# Patient Record
Sex: FEMALE | Race: WHITE | Employment: OTHER | URBAN - METROPOLITAN AREA
[De-identification: names, ages, dates, MRNs, and addresses within clinical notes are randomized per-mention and may not be internally consistent; named-entity substitution may affect disease eponyms.]

---

## 2016-03-17 LAB
LEFT VENTRICULAR EJECTION FRACTION MODE: NORMAL
LV EF: 75 %

## 2017-02-14 PROBLEM — R07.9 CHEST PAIN: Status: ACTIVE | Noted: 2017-02-14

## 2017-02-14 PROBLEM — I34.0 MITRAL VALVE INSUFFICIENCY: Status: ACTIVE | Noted: 2017-02-14

## 2018-04-03 ENCOUNTER — HOSPITAL ENCOUNTER (INPATIENT)
Age: 78
LOS: 2 days | Discharge: HOME | DRG: 391 | End: 2018-04-06
Attending: EMERGENCY MEDICINE | Admitting: INTERNAL MEDICINE
Payer: MEDICARE

## 2018-04-03 ENCOUNTER — APPOINTMENT (OUTPATIENT)
Dept: CT IMAGING | Age: 78
DRG: 391 | End: 2018-04-03
Payer: MEDICARE

## 2018-04-03 DIAGNOSIS — R19.7 DIARRHEA, UNSPECIFIED TYPE: ICD-10-CM

## 2018-04-03 DIAGNOSIS — E86.0 DEHYDRATION: Primary | ICD-10-CM

## 2018-04-03 DIAGNOSIS — K52.9 COLITIS: ICD-10-CM

## 2018-04-03 LAB
ALBUMIN SERPL-MCNC: 3.5 G/DL (ref 3.5–5.2)
ALP BLD-CCNC: 74 U/L (ref 35–104)
ALT SERPL-CCNC: <5 U/L (ref 0–32)
ANION GAP SERPL CALCULATED.3IONS-SCNC: 16 MMOL/L (ref 7–16)
AST SERPL-CCNC: 16 U/L (ref 0–31)
BASOPHILS ABSOLUTE: 0.05 E9/L (ref 0–0.2)
BASOPHILS RELATIVE PERCENT: 0.6 % (ref 0–2)
BILIRUB SERPL-MCNC: 0.3 MG/DL (ref 0–1.2)
BUN BLDV-MCNC: 36 MG/DL (ref 8–23)
CALCIUM SERPL-MCNC: 9.4 MG/DL (ref 8.6–10.2)
CHLORIDE BLD-SCNC: 87 MMOL/L (ref 98–107)
CO2: 26 MMOL/L (ref 22–29)
CREAT SERPL-MCNC: 5.9 MG/DL (ref 0.5–1)
EKG ATRIAL RATE: 76 BPM
EKG P AXIS: 68 DEGREES
EKG P-R INTERVAL: 164 MS
EKG Q-T INTERVAL: 396 MS
EKG QRS DURATION: 80 MS
EKG QTC CALCULATION (BAZETT): 445 MS
EKG R AXIS: 57 DEGREES
EKG T AXIS: 78 DEGREES
EKG VENTRICULAR RATE: 76 BPM
EOSINOPHILS ABSOLUTE: 0.38 E9/L (ref 0.05–0.5)
EOSINOPHILS RELATIVE PERCENT: 4.5 % (ref 0–6)
GFR AFRICAN AMERICAN: 6
GFR AFRICAN AMERICAN: 8
GFR NON-AFRICAN AMERICAN: 5 ML/MIN/1.73
GFR NON-AFRICAN AMERICAN: 7 ML/MIN/1.73
GLUCOSE BLD-MCNC: 90 MG/DL (ref 74–109)
GLUCOSE BLD-MCNC: 98 MG/DL (ref 74–109)
HCT VFR BLD CALC: 35.8 % (ref 34–48)
HEMOGLOBIN: 11.5 G/DL (ref 11.5–15.5)
IMMATURE GRANULOCYTES #: 0.02 E9/L
IMMATURE GRANULOCYTES %: 0.2 % (ref 0–5)
LACTIC ACID: 1 MMOL/L (ref 0.5–2.2)
LIPASE: 36 U/L (ref 13–60)
LYMPHOCYTES ABSOLUTE: 2.68 E9/L (ref 1.5–4)
LYMPHOCYTES RELATIVE PERCENT: 31.6 % (ref 20–42)
MCH RBC QN AUTO: 32.1 PG (ref 26–35)
MCHC RBC AUTO-ENTMCNC: 32.1 % (ref 32–34.5)
MCV RBC AUTO: 100 FL (ref 80–99.9)
MONOCYTES ABSOLUTE: 1.08 E9/L (ref 0.1–0.95)
MONOCYTES RELATIVE PERCENT: 12.7 % (ref 2–12)
NEUTROPHILS ABSOLUTE: 4.28 E9/L (ref 1.8–7.3)
NEUTROPHILS RELATIVE PERCENT: 50.4 % (ref 43–80)
PDW BLD-RTO: 14 FL (ref 11.5–15)
PLATELET # BLD: 242 E9/L (ref 130–450)
PMV BLD AUTO: 11 FL (ref 7–12)
POC CHLORIDE: 98 MMOL/L (ref 100–108)
POC CREATININE: 7.5 MG/DL (ref 0.5–1)
POC POTASSIUM: 5.2 MMOL/L (ref 3.5–5)
POC SODIUM: 132 MMOL/L (ref 132–146)
POTASSIUM SERPL-SCNC: 5.1 MMOL/L (ref 3.5–5)
RBC # BLD: 3.58 E12/L (ref 3.5–5.5)
SODIUM BLD-SCNC: 129 MMOL/L (ref 132–146)
TOTAL PROTEIN: 6.7 G/DL (ref 6.4–8.3)
WBC # BLD: 8.5 E9/L (ref 4.5–11.5)

## 2018-04-03 PROCEDURE — 83605 ASSAY OF LACTIC ACID: CPT

## 2018-04-03 PROCEDURE — 36415 COLL VENOUS BLD VENIPUNCTURE: CPT

## 2018-04-03 PROCEDURE — 83690 ASSAY OF LIPASE: CPT

## 2018-04-03 PROCEDURE — 96375 TX/PRO/DX INJ NEW DRUG ADDON: CPT

## 2018-04-03 PROCEDURE — 99285 EMERGENCY DEPT VISIT HI MDM: CPT

## 2018-04-03 PROCEDURE — 2580000003 HC RX 258: Performed by: PHYSICIAN ASSISTANT

## 2018-04-03 PROCEDURE — 96374 THER/PROPH/DIAG INJ IV PUSH: CPT

## 2018-04-03 PROCEDURE — 2500000003 HC RX 250 WO HCPCS: Performed by: PHYSICIAN ASSISTANT

## 2018-04-03 PROCEDURE — 84295 ASSAY OF SERUM SODIUM: CPT

## 2018-04-03 PROCEDURE — 96372 THER/PROPH/DIAG INJ SC/IM: CPT

## 2018-04-03 PROCEDURE — 82565 ASSAY OF CREATININE: CPT

## 2018-04-03 PROCEDURE — 84132 ASSAY OF SERUM POTASSIUM: CPT

## 2018-04-03 PROCEDURE — 6360000002 HC RX W HCPCS: Performed by: PHYSICIAN ASSISTANT

## 2018-04-03 PROCEDURE — S0028 INJECTION, FAMOTIDINE, 20 MG: HCPCS | Performed by: PHYSICIAN ASSISTANT

## 2018-04-03 PROCEDURE — 74176 CT ABD & PELVIS W/O CONTRAST: CPT

## 2018-04-03 PROCEDURE — 82435 ASSAY OF BLOOD CHLORIDE: CPT

## 2018-04-03 PROCEDURE — 82947 ASSAY GLUCOSE BLOOD QUANT: CPT

## 2018-04-03 PROCEDURE — 80053 COMPREHEN METABOLIC PANEL: CPT

## 2018-04-03 PROCEDURE — 85025 COMPLETE CBC W/AUTO DIFF WBC: CPT

## 2018-04-03 PROCEDURE — 93005 ELECTROCARDIOGRAM TRACING: CPT | Performed by: NURSE PRACTITIONER

## 2018-04-03 RX ORDER — ONDANSETRON 2 MG/ML
4 INJECTION INTRAMUSCULAR; INTRAVENOUS ONCE
Status: COMPLETED | OUTPATIENT
Start: 2018-04-03 | End: 2018-04-03

## 2018-04-03 RX ORDER — DICYCLOMINE HYDROCHLORIDE 10 MG/ML
20 INJECTION INTRAMUSCULAR ONCE
Status: COMPLETED | OUTPATIENT
Start: 2018-04-03 | End: 2018-04-03

## 2018-04-03 RX ORDER — CIPROFLOXACIN 2 MG/ML
400 INJECTION, SOLUTION INTRAVENOUS ONCE
Status: COMPLETED | OUTPATIENT
Start: 2018-04-03 | End: 2018-04-04

## 2018-04-03 RX ORDER — SODIUM CHLORIDE 9 MG/ML
INJECTION, SOLUTION INTRAVENOUS CONTINUOUS
Status: DISCONTINUED | OUTPATIENT
Start: 2018-04-03 | End: 2018-04-04 | Stop reason: SDUPTHER

## 2018-04-03 RX ADMIN — FAMOTIDINE 20 MG: 10 INJECTION INTRAVENOUS at 19:41

## 2018-04-03 RX ADMIN — DICYCLOMINE HYDROCHLORIDE 20 MG: 20 INJECTION, SOLUTION INTRAMUSCULAR at 19:41

## 2018-04-03 RX ADMIN — ONDANSETRON 4 MG: 2 INJECTION INTRAMUSCULAR; INTRAVENOUS at 19:41

## 2018-04-03 RX ADMIN — SODIUM CHLORIDE: 9 INJECTION, SOLUTION INTRAVENOUS at 21:40

## 2018-04-04 PROBLEM — K52.9 COLITIS: Status: ACTIVE | Noted: 2018-04-04

## 2018-04-04 LAB — OSMOLALITY: 280 MOSM/KG (ref 285–310)

## 2018-04-04 PROCEDURE — G8979 MOBILITY GOAL STATUS: HCPCS | Performed by: PHYSICAL THERAPIST

## 2018-04-04 PROCEDURE — 6370000000 HC RX 637 (ALT 250 FOR IP): Performed by: INTERNAL MEDICINE

## 2018-04-04 PROCEDURE — 6360000002 HC RX W HCPCS: Performed by: INTERNAL MEDICINE

## 2018-04-04 PROCEDURE — 6360000002 HC RX W HCPCS: Performed by: PHYSICIAN ASSISTANT

## 2018-04-04 PROCEDURE — 2500000003 HC RX 250 WO HCPCS: Performed by: INTERNAL MEDICINE

## 2018-04-04 PROCEDURE — G8978 MOBILITY CURRENT STATUS: HCPCS | Performed by: PHYSICAL THERAPIST

## 2018-04-04 PROCEDURE — G8988 SELF CARE GOAL STATUS: HCPCS

## 2018-04-04 PROCEDURE — 97161 PT EVAL LOW COMPLEX 20 MIN: CPT | Performed by: PHYSICAL THERAPIST

## 2018-04-04 PROCEDURE — G8980 MOBILITY D/C STATUS: HCPCS | Performed by: PHYSICAL THERAPIST

## 2018-04-04 PROCEDURE — 2580000003 HC RX 258: Performed by: INTERNAL MEDICINE

## 2018-04-04 PROCEDURE — 83930 ASSAY OF BLOOD OSMOLALITY: CPT

## 2018-04-04 PROCEDURE — G8989 SELF CARE D/C STATUS: HCPCS

## 2018-04-04 PROCEDURE — 1200000000 HC SEMI PRIVATE

## 2018-04-04 PROCEDURE — G8987 SELF CARE CURRENT STATUS: HCPCS

## 2018-04-04 PROCEDURE — 2500000003 HC RX 250 WO HCPCS: Performed by: PHYSICIAN ASSISTANT

## 2018-04-04 PROCEDURE — 5A1D70Z PERFORMANCE OF URINARY FILTRATION, INTERMITTENT, LESS THAN 6 HOURS PER DAY: ICD-10-PCS | Performed by: INTERNAL MEDICINE

## 2018-04-04 PROCEDURE — 97165 OT EVAL LOW COMPLEX 30 MIN: CPT

## 2018-04-04 PROCEDURE — 36415 COLL VENOUS BLD VENIPUNCTURE: CPT

## 2018-04-04 PROCEDURE — 90935 HEMODIALYSIS ONE EVALUATION: CPT

## 2018-04-04 RX ORDER — SODIUM CHLORIDE 0.9 % (FLUSH) 0.9 %
10 SYRINGE (ML) INJECTION PRN
Status: DISCONTINUED | OUTPATIENT
Start: 2018-04-04 | End: 2018-04-04 | Stop reason: SDUPTHER

## 2018-04-04 RX ORDER — CIPROFLOXACIN 2 MG/ML
400 INJECTION, SOLUTION INTRAVENOUS EVERY 12 HOURS
Status: DISCONTINUED | OUTPATIENT
Start: 2018-04-04 | End: 2018-04-04

## 2018-04-04 RX ORDER — PRAVASTATIN SODIUM 20 MG
80 TABLET ORAL DAILY
Status: DISCONTINUED | OUTPATIENT
Start: 2018-04-04 | End: 2018-04-06 | Stop reason: HOSPADM

## 2018-04-04 RX ORDER — AMLODIPINE BESYLATE 2.5 MG/1
2.5 TABLET ORAL DAILY
Status: DISCONTINUED | OUTPATIENT
Start: 2018-04-05 | End: 2018-04-06 | Stop reason: HOSPADM

## 2018-04-04 RX ORDER — AMLODIPINE BESYLATE 5 MG/1
5 TABLET ORAL DAILY
Status: DISCONTINUED | OUTPATIENT
Start: 2018-04-04 | End: 2018-04-04

## 2018-04-04 RX ORDER — HEPARIN SODIUM 10000 [USP'U]/ML
5000 INJECTION, SOLUTION INTRAVENOUS; SUBCUTANEOUS EVERY 8 HOURS SCHEDULED
Status: DISCONTINUED | OUTPATIENT
Start: 2018-04-04 | End: 2018-04-06 | Stop reason: HOSPADM

## 2018-04-04 RX ORDER — ACETAMINOPHEN 325 MG/1
650 TABLET ORAL EVERY 4 HOURS PRN
Status: DISCONTINUED | OUTPATIENT
Start: 2018-04-04 | End: 2018-04-04 | Stop reason: SDUPTHER

## 2018-04-04 RX ORDER — HYDROCODONE BITARTRATE AND ACETAMINOPHEN 5; 325 MG/1; MG/1
2 TABLET ORAL EVERY 4 HOURS PRN
Status: DISCONTINUED | OUTPATIENT
Start: 2018-04-04 | End: 2018-04-06 | Stop reason: HOSPADM

## 2018-04-04 RX ORDER — ACETAMINOPHEN 325 MG/1
650 TABLET ORAL EVERY 4 HOURS PRN
Status: DISCONTINUED | OUTPATIENT
Start: 2018-04-04 | End: 2018-04-06 | Stop reason: HOSPADM

## 2018-04-04 RX ORDER — POTASSIUM CHLORIDE 20 MEQ/1
40 TABLET, EXTENDED RELEASE ORAL PRN
Status: DISCONTINUED | OUTPATIENT
Start: 2018-04-04 | End: 2018-04-04

## 2018-04-04 RX ORDER — SODIUM CHLORIDE 0.9 % (FLUSH) 0.9 %
10 SYRINGE (ML) INJECTION EVERY 12 HOURS SCHEDULED
Status: DISCONTINUED | OUTPATIENT
Start: 2018-04-04 | End: 2018-04-06 | Stop reason: HOSPADM

## 2018-04-04 RX ORDER — CIPROFLOXACIN 2 MG/ML
400 INJECTION, SOLUTION INTRAVENOUS EVERY 24 HOURS
Status: DISCONTINUED | OUTPATIENT
Start: 2018-04-04 | End: 2018-04-06

## 2018-04-04 RX ORDER — POTASSIUM CHLORIDE 20MEQ/15ML
40 LIQUID (ML) ORAL PRN
Status: DISCONTINUED | OUTPATIENT
Start: 2018-04-04 | End: 2018-04-04

## 2018-04-04 RX ORDER — ASPIRIN 81 MG/1
81 TABLET ORAL DAILY
Status: DISCONTINUED | OUTPATIENT
Start: 2018-04-04 | End: 2018-04-06 | Stop reason: HOSPADM

## 2018-04-04 RX ORDER — SODIUM CHLORIDE 0.9 % (FLUSH) 0.9 %
10 SYRINGE (ML) INJECTION PRN
Status: DISCONTINUED | OUTPATIENT
Start: 2018-04-04 | End: 2018-04-06 | Stop reason: HOSPADM

## 2018-04-04 RX ORDER — ONDANSETRON 2 MG/ML
4 INJECTION INTRAMUSCULAR; INTRAVENOUS EVERY 6 HOURS PRN
Status: DISCONTINUED | OUTPATIENT
Start: 2018-04-04 | End: 2018-04-06 | Stop reason: HOSPADM

## 2018-04-04 RX ORDER — HYDROCODONE BITARTRATE AND ACETAMINOPHEN 5; 325 MG/1; MG/1
1 TABLET ORAL EVERY 4 HOURS PRN
Status: DISCONTINUED | OUTPATIENT
Start: 2018-04-04 | End: 2018-04-06 | Stop reason: HOSPADM

## 2018-04-04 RX ORDER — MORPHINE SULFATE 2 MG/ML
2 INJECTION, SOLUTION INTRAMUSCULAR; INTRAVENOUS EVERY 4 HOURS PRN
Status: DISCONTINUED | OUTPATIENT
Start: 2018-04-04 | End: 2018-04-06 | Stop reason: HOSPADM

## 2018-04-04 RX ORDER — POTASSIUM CHLORIDE 7.45 MG/ML
10 INJECTION INTRAVENOUS PRN
Status: DISCONTINUED | OUTPATIENT
Start: 2018-04-04 | End: 2018-04-04

## 2018-04-04 RX ORDER — SODIUM CHLORIDE 0.9 % (FLUSH) 0.9 %
10 SYRINGE (ML) INJECTION EVERY 12 HOURS SCHEDULED
Status: DISCONTINUED | OUTPATIENT
Start: 2018-04-04 | End: 2018-04-04 | Stop reason: SDUPTHER

## 2018-04-04 RX ORDER — SODIUM CHLORIDE 9 MG/ML
INJECTION, SOLUTION INTRAVENOUS CONTINUOUS
Status: DISCONTINUED | OUTPATIENT
Start: 2018-04-04 | End: 2018-04-05

## 2018-04-04 RX ORDER — VALPROIC ACID 250 MG/1
250 CAPSULE, LIQUID FILLED ORAL 3 TIMES DAILY
Status: DISCONTINUED | OUTPATIENT
Start: 2018-04-04 | End: 2018-04-06 | Stop reason: HOSPADM

## 2018-04-04 RX ORDER — PANTOPRAZOLE SODIUM 40 MG/1
40 TABLET, DELAYED RELEASE ORAL DAILY
Status: DISCONTINUED | OUTPATIENT
Start: 2018-04-04 | End: 2018-04-06 | Stop reason: HOSPADM

## 2018-04-04 RX ADMIN — ASPIRIN 81 MG: 81 TABLET, COATED ORAL at 09:18

## 2018-04-04 RX ADMIN — CIPROFLOXACIN 400 MG: 2 INJECTION, SOLUTION INTRAVENOUS at 13:12

## 2018-04-04 RX ADMIN — HEPARIN SODIUM 5000 UNITS: 10000 INJECTION, SOLUTION INTRAVENOUS; SUBCUTANEOUS at 13:12

## 2018-04-04 RX ADMIN — CIPROFLOXACIN 400 MG: 2 INJECTION, SOLUTION INTRAVENOUS at 01:33

## 2018-04-04 RX ADMIN — SODIUM CHLORIDE: 9 INJECTION, SOLUTION INTRAVENOUS at 07:03

## 2018-04-04 RX ADMIN — METRONIDAZOLE 500 MG: 500 INJECTION, SOLUTION INTRAVENOUS at 03:10

## 2018-04-04 RX ADMIN — VALPROIC ACID 250 MG: 250 CAPSULE, LIQUID FILLED ORAL at 09:18

## 2018-04-04 RX ADMIN — PRAVASTATIN SODIUM 80 MG: 20 TABLET ORAL at 09:18

## 2018-04-04 RX ADMIN — HEPARIN SODIUM 5000 UNITS: 10000 INJECTION, SOLUTION INTRAVENOUS; SUBCUTANEOUS at 22:31

## 2018-04-04 RX ADMIN — METRONIDAZOLE 500 MG: 500 INJECTION, SOLUTION INTRAVENOUS at 18:08

## 2018-04-04 RX ADMIN — METRONIDAZOLE 500 MG: 500 INJECTION, SOLUTION INTRAVENOUS at 11:00

## 2018-04-04 RX ADMIN — VALPROIC ACID 250 MG: 250 CAPSULE, LIQUID FILLED ORAL at 22:31

## 2018-04-04 RX ADMIN — ONDANSETRON 4 MG: 2 INJECTION INTRAMUSCULAR; INTRAVENOUS at 22:31

## 2018-04-04 RX ADMIN — VALPROIC ACID 250 MG: 250 CAPSULE, LIQUID FILLED ORAL at 13:13

## 2018-04-04 RX ADMIN — SODIUM CHLORIDE: 9 INJECTION, SOLUTION INTRAVENOUS at 18:08

## 2018-04-04 RX ADMIN — METOPROLOL TARTRATE 12.5 MG: 25 TABLET ORAL at 22:28

## 2018-04-04 RX ADMIN — ONDANSETRON 4 MG: 2 INJECTION INTRAMUSCULAR; INTRAVENOUS at 02:57

## 2018-04-04 RX ADMIN — HEPARIN SODIUM 5000 UNITS: 10000 INJECTION, SOLUTION INTRAVENOUS; SUBCUTANEOUS at 07:11

## 2018-04-04 RX ADMIN — PANTOPRAZOLE SODIUM 40 MG: 40 TABLET, DELAYED RELEASE ORAL at 09:19

## 2018-04-04 ASSESSMENT — PAIN SCALES - GENERAL
PAINLEVEL_OUTOF10: 0
PAINLEVEL_OUTOF10: 0

## 2018-04-05 LAB
BACTERIA: ABNORMAL /HPF
BASOPHILS ABSOLUTE: 0.04 E9/L (ref 0–0.2)
BASOPHILS RELATIVE PERCENT: 0.9 % (ref 0–2)
BILIRUBIN URINE: NEGATIVE
BLOOD, URINE: NEGATIVE
CLARITY: CLEAR
COLOR: YELLOW
EOSINOPHILS ABSOLUTE: 0.2 E9/L (ref 0.05–0.5)
EOSINOPHILS RELATIVE PERCENT: 4.3 % (ref 0–6)
EPITHELIAL CELLS, UA: ABNORMAL /HPF
GLUCOSE URINE: NEGATIVE MG/DL
HCT VFR BLD CALC: 26.7 % (ref 34–48)
HEMOGLOBIN: 9 G/DL (ref 11.5–15.5)
KETONES, URINE: NEGATIVE MG/DL
LEUKOCYTE ESTERASE, URINE: ABNORMAL
LYMPHOCYTES ABSOLUTE: 1.06 E9/L (ref 1.5–4)
LYMPHOCYTES RELATIVE PERCENT: 22.6 % (ref 20–42)
MCH RBC QN AUTO: 32.7 PG (ref 26–35)
MCHC RBC AUTO-ENTMCNC: 33.7 % (ref 32–34.5)
MCV RBC AUTO: 97.1 FL (ref 80–99.9)
MONOCYTES ABSOLUTE: 0.51 E9/L (ref 0.1–0.95)
MONOCYTES RELATIVE PERCENT: 11.3 % (ref 2–12)
NEUTROPHILS ABSOLUTE: 2.81 E9/L (ref 1.8–7.3)
NEUTROPHILS RELATIVE PERCENT: 60.9 % (ref 43–80)
NITRITE, URINE: NEGATIVE
PDW BLD-RTO: 13.4 FL (ref 11.5–15)
PH UA: 8 (ref 5–9)
PHOSPHORUS: 3.2 MG/DL (ref 2.5–4.5)
PLATELET # BLD: 203 E9/L (ref 130–450)
PMV BLD AUTO: 10.4 FL (ref 7–12)
PROTEIN UA: ABNORMAL MG/DL
RBC # BLD: 2.75 E12/L (ref 3.5–5.5)
RBC # BLD: NORMAL 10*6/UL
RBC UA: ABNORMAL /HPF (ref 0–2)
SPECIFIC GRAVITY UA: 1.01 (ref 1–1.03)
UROBILINOGEN, URINE: 0.2 E.U./DL
WBC # BLD: 4.6 E9/L (ref 4.5–11.5)
WBC UA: ABNORMAL /HPF (ref 0–5)

## 2018-04-05 PROCEDURE — 6370000000 HC RX 637 (ALT 250 FOR IP): Performed by: INTERNAL MEDICINE

## 2018-04-05 PROCEDURE — 84100 ASSAY OF PHOSPHORUS: CPT

## 2018-04-05 PROCEDURE — 85025 COMPLETE CBC W/AUTO DIFF WBC: CPT

## 2018-04-05 PROCEDURE — 2580000003 HC RX 258: Performed by: INTERNAL MEDICINE

## 2018-04-05 PROCEDURE — 36415 COLL VENOUS BLD VENIPUNCTURE: CPT

## 2018-04-05 PROCEDURE — 81001 URINALYSIS AUTO W/SCOPE: CPT

## 2018-04-05 PROCEDURE — 1200000000 HC SEMI PRIVATE

## 2018-04-05 PROCEDURE — 6360000002 HC RX W HCPCS: Performed by: INTERNAL MEDICINE

## 2018-04-05 PROCEDURE — 87088 URINE BACTERIA CULTURE: CPT

## 2018-04-05 PROCEDURE — 2500000003 HC RX 250 WO HCPCS: Performed by: INTERNAL MEDICINE

## 2018-04-05 RX ADMIN — METRONIDAZOLE 500 MG: 500 INJECTION, SOLUTION INTRAVENOUS at 10:32

## 2018-04-05 RX ADMIN — AMLODIPINE BESYLATE 2.5 MG: 2.5 TABLET ORAL at 09:03

## 2018-04-05 RX ADMIN — SODIUM CHLORIDE: 9 INJECTION, SOLUTION INTRAVENOUS at 09:02

## 2018-04-05 RX ADMIN — ASPIRIN 81 MG: 81 TABLET, COATED ORAL at 09:03

## 2018-04-05 RX ADMIN — METRONIDAZOLE 500 MG: 500 INJECTION, SOLUTION INTRAVENOUS at 03:53

## 2018-04-05 RX ADMIN — METOPROLOL TARTRATE 12.5 MG: 25 TABLET ORAL at 09:03

## 2018-04-05 RX ADMIN — Medication 10 ML: at 22:01

## 2018-04-05 RX ADMIN — ONDANSETRON 4 MG: 2 INJECTION INTRAMUSCULAR; INTRAVENOUS at 20:08

## 2018-04-05 RX ADMIN — CIPROFLOXACIN 400 MG: 2 INJECTION, SOLUTION INTRAVENOUS at 14:05

## 2018-04-05 RX ADMIN — HEPARIN SODIUM 5000 UNITS: 10000 INJECTION, SOLUTION INTRAVENOUS; SUBCUTANEOUS at 14:06

## 2018-04-05 RX ADMIN — VALPROIC ACID 250 MG: 250 CAPSULE, LIQUID FILLED ORAL at 09:02

## 2018-04-05 RX ADMIN — VALPROIC ACID 250 MG: 250 CAPSULE, LIQUID FILLED ORAL at 14:06

## 2018-04-05 RX ADMIN — METRONIDAZOLE 500 MG: 500 INJECTION, SOLUTION INTRAVENOUS at 20:08

## 2018-04-05 RX ADMIN — PANTOPRAZOLE SODIUM 40 MG: 40 TABLET, DELAYED RELEASE ORAL at 09:02

## 2018-04-05 RX ADMIN — HEPARIN SODIUM 5000 UNITS: 10000 INJECTION, SOLUTION INTRAVENOUS; SUBCUTANEOUS at 20:08

## 2018-04-05 RX ADMIN — VALPROIC ACID 250 MG: 250 CAPSULE, LIQUID FILLED ORAL at 20:09

## 2018-04-05 RX ADMIN — HEPARIN SODIUM 5000 UNITS: 10000 INJECTION, SOLUTION INTRAVENOUS; SUBCUTANEOUS at 06:15

## 2018-04-05 RX ADMIN — PRAVASTATIN SODIUM 80 MG: 20 TABLET ORAL at 09:03

## 2018-04-05 ASSESSMENT — PAIN SCALES - GENERAL
PAINLEVEL_OUTOF10: 0
PAINLEVEL_OUTOF10: 0

## 2018-04-06 VITALS
HEIGHT: 63 IN | OXYGEN SATURATION: 98 % | TEMPERATURE: 98.2 F | SYSTOLIC BLOOD PRESSURE: 110 MMHG | DIASTOLIC BLOOD PRESSURE: 60 MMHG | RESPIRATION RATE: 18 BRPM | HEART RATE: 68 BPM | WEIGHT: 106.48 LBS | BODY MASS INDEX: 18.87 KG/M2

## 2018-04-06 LAB
ALBUMIN SERPL-MCNC: 3 G/DL (ref 3.5–5.2)
ALP BLD-CCNC: 56 U/L (ref 35–104)
ALT SERPL-CCNC: <5 U/L (ref 0–32)
ANION GAP SERPL CALCULATED.3IONS-SCNC: 17 MMOL/L (ref 7–16)
AST SERPL-CCNC: 10 U/L (ref 0–31)
BILIRUB SERPL-MCNC: 0.2 MG/DL (ref 0–1.2)
BUN BLDV-MCNC: 24 MG/DL (ref 8–23)
CALCIUM SERPL-MCNC: 8.6 MG/DL (ref 8.6–10.2)
CHLORIDE BLD-SCNC: 100 MMOL/L (ref 98–107)
CO2: 25 MMOL/L (ref 22–29)
CREAT SERPL-MCNC: 5 MG/DL (ref 0.5–1)
GFR AFRICAN AMERICAN: 10
GFR NON-AFRICAN AMERICAN: 8 ML/MIN/1.73
GLUCOSE BLD-MCNC: 89 MG/DL (ref 74–109)
PHOSPHORUS: 3.7 MG/DL (ref 2.5–4.5)
POTASSIUM SERPL-SCNC: 3.6 MMOL/L (ref 3.5–5)
SODIUM BLD-SCNC: 142 MMOL/L (ref 132–146)
TOTAL PROTEIN: 5.1 G/DL (ref 6.4–8.3)

## 2018-04-06 PROCEDURE — 36415 COLL VENOUS BLD VENIPUNCTURE: CPT

## 2018-04-06 PROCEDURE — 90935 HEMODIALYSIS ONE EVALUATION: CPT | Performed by: INTERNAL MEDICINE

## 2018-04-06 PROCEDURE — 6370000000 HC RX 637 (ALT 250 FOR IP): Performed by: INTERNAL MEDICINE

## 2018-04-06 PROCEDURE — 6360000002 HC RX W HCPCS: Performed by: INTERNAL MEDICINE

## 2018-04-06 PROCEDURE — 6370000000 HC RX 637 (ALT 250 FOR IP): Performed by: STUDENT IN AN ORGANIZED HEALTH CARE EDUCATION/TRAINING PROGRAM

## 2018-04-06 PROCEDURE — 80053 COMPREHEN METABOLIC PANEL: CPT

## 2018-04-06 PROCEDURE — 84100 ASSAY OF PHOSPHORUS: CPT

## 2018-04-06 PROCEDURE — 2500000003 HC RX 250 WO HCPCS: Performed by: INTERNAL MEDICINE

## 2018-04-06 PROCEDURE — 2580000003 HC RX 258: Performed by: INTERNAL MEDICINE

## 2018-04-06 RX ORDER — CIPROFLOXACIN 250 MG/1
250 TABLET, FILM COATED ORAL
Status: DISCONTINUED | OUTPATIENT
Start: 2018-04-06 | End: 2018-04-06 | Stop reason: HOSPADM

## 2018-04-06 RX ORDER — METRONIDAZOLE 500 MG/1
500 TABLET ORAL EVERY 8 HOURS SCHEDULED
Status: DISCONTINUED | OUTPATIENT
Start: 2018-04-06 | End: 2018-04-06 | Stop reason: HOSPADM

## 2018-04-06 RX ORDER — CIPROFLOXACIN 250 MG/1
250 TABLET, FILM COATED ORAL 2 TIMES DAILY
Qty: 14 TABLET | Refills: 0 | Status: SHIPPED | OUTPATIENT
Start: 2018-04-06 | End: 2018-04-06 | Stop reason: HOSPADM

## 2018-04-06 RX ORDER — CIPROFLOXACIN 250 MG/1
250 TABLET, FILM COATED ORAL
Qty: 12 TABLET | Refills: 0 | Status: SHIPPED | OUTPATIENT
Start: 2018-04-06 | End: 2018-04-13

## 2018-04-06 RX ORDER — METRONIDAZOLE 500 MG/1
500 TABLET ORAL 3 TIMES DAILY
Qty: 21 TABLET | Refills: 0 | Status: SHIPPED | OUTPATIENT
Start: 2018-04-06 | End: 2018-04-13

## 2018-04-06 RX ADMIN — METRONIDAZOLE 500 MG: 500 INJECTION, SOLUTION INTRAVENOUS at 03:01

## 2018-04-06 RX ADMIN — PANTOPRAZOLE SODIUM 40 MG: 40 TABLET, DELAYED RELEASE ORAL at 08:52

## 2018-04-06 RX ADMIN — AMLODIPINE BESYLATE 2.5 MG: 2.5 TABLET ORAL at 08:52

## 2018-04-06 RX ADMIN — VALPROIC ACID 250 MG: 250 CAPSULE, LIQUID FILLED ORAL at 08:53

## 2018-04-06 RX ADMIN — METOPROLOL TARTRATE 12.5 MG: 25 TABLET ORAL at 08:52

## 2018-04-06 RX ADMIN — HEPARIN SODIUM 5000 UNITS: 10000 INJECTION, SOLUTION INTRAVENOUS; SUBCUTANEOUS at 06:18

## 2018-04-06 RX ADMIN — PRAVASTATIN SODIUM 80 MG: 20 TABLET ORAL at 08:53

## 2018-04-06 RX ADMIN — DARBEPOETIN ALFA 60 MCG: 60 INJECTION, SOLUTION INTRAVENOUS; SUBCUTANEOUS at 11:34

## 2018-04-06 RX ADMIN — ASPIRIN 81 MG: 81 TABLET, COATED ORAL at 08:53

## 2018-04-06 RX ADMIN — CIPROFLOXACIN 250 MG: 250 TABLET, FILM COATED ORAL at 11:34

## 2018-04-06 RX ADMIN — Medication 10 ML: at 03:01

## 2018-04-06 RX ADMIN — Medication 10 ML: at 08:52

## 2018-04-06 ASSESSMENT — PAIN SCALES - GENERAL
PAINLEVEL_OUTOF10: 0

## 2018-04-07 LAB — URINE CULTURE, ROUTINE: NORMAL

## 2018-12-07 ENCOUNTER — OFFICE VISIT (OUTPATIENT)
Dept: CARDIOLOGY CLINIC | Age: 78
End: 2018-12-07
Payer: MEDICARE

## 2018-12-07 VITALS
HEIGHT: 63 IN | SYSTOLIC BLOOD PRESSURE: 116 MMHG | BODY MASS INDEX: 21.26 KG/M2 | HEART RATE: 79 BPM | DIASTOLIC BLOOD PRESSURE: 58 MMHG | WEIGHT: 120 LBS | RESPIRATION RATE: 16 BRPM

## 2018-12-07 DIAGNOSIS — Z98.890 H/O CARDIAC RADIOFREQUENCY ABLATION: ICD-10-CM

## 2018-12-07 DIAGNOSIS — I35.0 NONRHEUMATIC AORTIC VALVE STENOSIS: ICD-10-CM

## 2018-12-07 DIAGNOSIS — N25.81 HYPERPARATHYROIDISM DUE TO RENAL INSUFFICIENCY (HCC): ICD-10-CM

## 2018-12-07 DIAGNOSIS — Z99.2 ESRD ON HEMODIALYSIS (HCC): ICD-10-CM

## 2018-12-07 DIAGNOSIS — Z96.649 S/P HIP HEMIARTHROPLASTY: ICD-10-CM

## 2018-12-07 DIAGNOSIS — Z87.39 H/O: GOUT: ICD-10-CM

## 2018-12-07 DIAGNOSIS — Z95.2 S/P AVR: ICD-10-CM

## 2018-12-07 DIAGNOSIS — N18.6 ESRD ON HEMODIALYSIS (HCC): ICD-10-CM

## 2018-12-07 DIAGNOSIS — I48.0 PAF (PAROXYSMAL ATRIAL FIBRILLATION) (HCC): Primary | ICD-10-CM

## 2018-12-07 DIAGNOSIS — I77.9 MILD CAROTID ARTERY DISEASE (HCC): ICD-10-CM

## 2018-12-07 DIAGNOSIS — Z86.39 H/O HYPERLIPIDEMIA: ICD-10-CM

## 2018-12-07 DIAGNOSIS — I34.0 NON-RHEUMATIC MITRAL REGURGITATION: ICD-10-CM

## 2018-12-07 DIAGNOSIS — F31.9 BIPOLAR AFFECTIVE DISORDER, REMISSION STATUS UNSPECIFIED (HCC): ICD-10-CM

## 2018-12-07 DIAGNOSIS — Z98.890 H/O MAZE PROCEDURE: ICD-10-CM

## 2018-12-07 DIAGNOSIS — D63.8 ANEMIA OF CHRONIC DISEASE: ICD-10-CM

## 2018-12-07 DIAGNOSIS — Q24.5 ANOMALOUS AORTIC ORIGIN OF CORONARY ARTERY: ICD-10-CM

## 2018-12-07 DIAGNOSIS — I10 ESSENTIAL HYPERTENSION: ICD-10-CM

## 2018-12-07 PROCEDURE — G8484 FLU IMMUNIZE NO ADMIN: HCPCS | Performed by: INTERNAL MEDICINE

## 2018-12-07 PROCEDURE — G8399 PT W/DXA RESULTS DOCUMENT: HCPCS | Performed by: INTERNAL MEDICINE

## 2018-12-07 PROCEDURE — G8427 DOCREV CUR MEDS BY ELIG CLIN: HCPCS | Performed by: INTERNAL MEDICINE

## 2018-12-07 PROCEDURE — G8420 CALC BMI NORM PARAMETERS: HCPCS | Performed by: INTERNAL MEDICINE

## 2018-12-07 PROCEDURE — 99213 OFFICE O/P EST LOW 20 MIN: CPT | Performed by: INTERNAL MEDICINE

## 2018-12-07 PROCEDURE — G8598 ASA/ANTIPLAT THER USED: HCPCS | Performed by: INTERNAL MEDICINE

## 2018-12-07 PROCEDURE — 93000 ELECTROCARDIOGRAM COMPLETE: CPT | Performed by: INTERNAL MEDICINE

## 2018-12-07 PROCEDURE — 1101F PT FALLS ASSESS-DOCD LE1/YR: CPT | Performed by: INTERNAL MEDICINE

## 2018-12-07 PROCEDURE — 1123F ACP DISCUSS/DSCN MKR DOCD: CPT | Performed by: INTERNAL MEDICINE

## 2018-12-07 PROCEDURE — 1036F TOBACCO NON-USER: CPT | Performed by: INTERNAL MEDICINE

## 2018-12-07 PROCEDURE — 4040F PNEUMOC VAC/ADMIN/RCVD: CPT | Performed by: INTERNAL MEDICINE

## 2018-12-07 PROCEDURE — 1090F PRES/ABSN URINE INCON ASSESS: CPT | Performed by: INTERNAL MEDICINE

## 2018-12-07 RX ORDER — ESZOPICLONE 2 MG/1
2 TABLET, FILM COATED ORAL NIGHTLY PRN
COMMUNITY

## 2018-12-18 ENCOUNTER — OFFICE VISIT (OUTPATIENT)
Dept: VASCULAR SURGERY | Age: 78
End: 2018-12-18
Payer: MEDICARE

## 2018-12-18 DIAGNOSIS — T82.898A PROBLEM WITH DIALYSIS ACCESS, INITIAL ENCOUNTER (HCC): Primary | ICD-10-CM

## 2018-12-18 PROCEDURE — 99213 OFFICE O/P EST LOW 20 MIN: CPT | Performed by: NURSE PRACTITIONER

## 2018-12-18 PROCEDURE — 1090F PRES/ABSN URINE INCON ASSESS: CPT | Performed by: NURSE PRACTITIONER

## 2018-12-18 PROCEDURE — 1036F TOBACCO NON-USER: CPT | Performed by: NURSE PRACTITIONER

## 2018-12-18 PROCEDURE — G8484 FLU IMMUNIZE NO ADMIN: HCPCS | Performed by: NURSE PRACTITIONER

## 2018-12-18 PROCEDURE — G8399 PT W/DXA RESULTS DOCUMENT: HCPCS | Performed by: NURSE PRACTITIONER

## 2018-12-18 PROCEDURE — 1123F ACP DISCUSS/DSCN MKR DOCD: CPT | Performed by: NURSE PRACTITIONER

## 2018-12-18 PROCEDURE — G8420 CALC BMI NORM PARAMETERS: HCPCS | Performed by: NURSE PRACTITIONER

## 2018-12-18 PROCEDURE — 1101F PT FALLS ASSESS-DOCD LE1/YR: CPT | Performed by: NURSE PRACTITIONER

## 2018-12-18 PROCEDURE — G8427 DOCREV CUR MEDS BY ELIG CLIN: HCPCS | Performed by: NURSE PRACTITIONER

## 2018-12-18 PROCEDURE — G8598 ASA/ANTIPLAT THER USED: HCPCS | Performed by: NURSE PRACTITIONER

## 2018-12-18 PROCEDURE — 4040F PNEUMOC VAC/ADMIN/RCVD: CPT | Performed by: NURSE PRACTITIONER

## 2018-12-18 NOTE — PROGRESS NOTES
[facility])    -  Primary            I reviewed with the patient that I do not recommend intervention on the fistula at this time as it is functioning. I do not feel that the fistula could be revised further. If she develops prolonged bleeding or ulceration, I would recommend ligation of the fistula and creation of a new brachiocephalic fistula vs insertion of AV graft. I asked the patient to call me with any problems with her access. Pt seen and plan reviewed with Dr. Hector Pavon. Ryan Swain CNP       No Follow-up on file.

## 2019-08-06 ENCOUNTER — HOSPITAL ENCOUNTER (OUTPATIENT)
Age: 79
Setting detail: OBSERVATION
Discharge: HOME | End: 2019-08-08
Attending: EMERGENCY MEDICINE | Admitting: INTERNAL MEDICINE
Payer: MEDICARE

## 2019-08-06 ENCOUNTER — APPOINTMENT (OUTPATIENT)
Dept: GENERAL RADIOLOGY | Age: 79
End: 2019-08-06
Payer: MEDICARE

## 2019-08-06 DIAGNOSIS — E87.5 HYPERKALEMIA: ICD-10-CM

## 2019-08-06 DIAGNOSIS — N18.6 ESRD (END STAGE RENAL DISEASE) ON DIALYSIS (HCC): ICD-10-CM

## 2019-08-06 DIAGNOSIS — Z99.2 ESRD (END STAGE RENAL DISEASE) ON DIALYSIS (HCC): ICD-10-CM

## 2019-08-06 DIAGNOSIS — R07.9 CHEST PAIN, UNSPECIFIED TYPE: Primary | ICD-10-CM

## 2019-08-06 LAB
ALBUMIN SERPL-MCNC: 4.4 G/DL (ref 3.5–5.2)
ALP BLD-CCNC: 75 U/L (ref 35–104)
ALT SERPL-CCNC: <5 U/L (ref 0–32)
ANION GAP SERPL CALCULATED.3IONS-SCNC: 13 MMOL/L (ref 7–16)
AST SERPL-CCNC: 14 U/L (ref 0–31)
BASOPHILS ABSOLUTE: 0.04 E9/L (ref 0–0.2)
BASOPHILS RELATIVE PERCENT: 0.5 % (ref 0–2)
BILIRUB SERPL-MCNC: 0.3 MG/DL (ref 0–1.2)
BUN BLDV-MCNC: 41 MG/DL (ref 8–23)
CALCIUM SERPL-MCNC: 9.8 MG/DL (ref 8.6–10.2)
CHLORIDE BLD-SCNC: 95 MMOL/L (ref 98–107)
CO2: 31 MMOL/L (ref 22–29)
CREAT SERPL-MCNC: 5.6 MG/DL (ref 0.5–1)
EOSINOPHILS ABSOLUTE: 0.18 E9/L (ref 0.05–0.5)
EOSINOPHILS RELATIVE PERCENT: 2.5 % (ref 0–6)
GFR AFRICAN AMERICAN: 9
GFR NON-AFRICAN AMERICAN: 7 ML/MIN/1.73
GLUCOSE BLD-MCNC: 87 MG/DL (ref 74–99)
HCT VFR BLD CALC: 36.7 % (ref 34–48)
HEMOGLOBIN: 11.7 G/DL (ref 11.5–15.5)
IMMATURE GRANULOCYTES #: 0.03 E9/L
IMMATURE GRANULOCYTES %: 0.4 % (ref 0–5)
LYMPHOCYTES ABSOLUTE: 2.23 E9/L (ref 1.5–4)
LYMPHOCYTES RELATIVE PERCENT: 30.6 % (ref 20–42)
MAGNESIUM: 2.3 MG/DL (ref 1.6–2.6)
MCH RBC QN AUTO: 32.1 PG (ref 26–35)
MCHC RBC AUTO-ENTMCNC: 31.9 % (ref 32–34.5)
MCV RBC AUTO: 100.5 FL (ref 80–99.9)
MONOCYTES ABSOLUTE: 0.74 E9/L (ref 0.1–0.95)
MONOCYTES RELATIVE PERCENT: 10.2 % (ref 2–12)
NEUTROPHILS ABSOLUTE: 4.07 E9/L (ref 1.8–7.3)
NEUTROPHILS RELATIVE PERCENT: 55.8 % (ref 43–80)
PDW BLD-RTO: 17.1 FL (ref 11.5–15)
PHOSPHORUS: 4.2 MG/DL (ref 2.5–4.5)
PLATELET # BLD: 259 E9/L (ref 130–450)
PMV BLD AUTO: 10.5 FL (ref 7–12)
POTASSIUM SERPL-SCNC: 5.4 MMOL/L (ref 3.5–5)
RBC # BLD: 3.65 E12/L (ref 3.5–5.5)
SODIUM BLD-SCNC: 139 MMOL/L (ref 132–146)
TOTAL PROTEIN: 7.3 G/DL (ref 6.4–8.3)
TROPONIN: 0.04 NG/ML (ref 0–0.03)
WBC # BLD: 7.3 E9/L (ref 4.5–11.5)

## 2019-08-06 PROCEDURE — 71046 X-RAY EXAM CHEST 2 VIEWS: CPT

## 2019-08-06 PROCEDURE — 99285 EMERGENCY DEPT VISIT HI MDM: CPT

## 2019-08-06 PROCEDURE — 84100 ASSAY OF PHOSPHORUS: CPT

## 2019-08-06 PROCEDURE — 93005 ELECTROCARDIOGRAM TRACING: CPT | Performed by: PHYSICIAN ASSISTANT

## 2019-08-06 PROCEDURE — 6370000000 HC RX 637 (ALT 250 FOR IP): Performed by: NURSE PRACTITIONER

## 2019-08-06 PROCEDURE — 96375 TX/PRO/DX INJ NEW DRUG ADDON: CPT

## 2019-08-06 PROCEDURE — 2580000003 HC RX 258: Performed by: NURSE PRACTITIONER

## 2019-08-06 PROCEDURE — 84484 ASSAY OF TROPONIN QUANT: CPT

## 2019-08-06 PROCEDURE — 85025 COMPLETE CBC W/AUTO DIFF WBC: CPT

## 2019-08-06 PROCEDURE — 83735 ASSAY OF MAGNESIUM: CPT

## 2019-08-06 PROCEDURE — 96365 THER/PROPH/DIAG IV INF INIT: CPT

## 2019-08-06 PROCEDURE — 80053 COMPREHEN METABOLIC PANEL: CPT

## 2019-08-06 PROCEDURE — G0378 HOSPITAL OBSERVATION PER HR: HCPCS

## 2019-08-06 PROCEDURE — 36415 COLL VENOUS BLD VENIPUNCTURE: CPT

## 2019-08-06 PROCEDURE — 2500000003 HC RX 250 WO HCPCS: Performed by: NURSE PRACTITIONER

## 2019-08-06 RX ORDER — SEVELAMER CARBONATE 800 MG/1
800 TABLET, FILM COATED ORAL DAILY
Status: DISCONTINUED | OUTPATIENT
Start: 2019-08-07 | End: 2019-08-08 | Stop reason: HOSPADM

## 2019-08-06 RX ORDER — ONDANSETRON 2 MG/ML
4 INJECTION INTRAMUSCULAR; INTRAVENOUS EVERY 6 HOURS PRN
Status: DISCONTINUED | OUTPATIENT
Start: 2019-08-06 | End: 2019-08-08 | Stop reason: HOSPADM

## 2019-08-06 RX ORDER — ACETAMINOPHEN 325 MG/1
650 TABLET ORAL EVERY 6 HOURS PRN
Status: DISCONTINUED | OUTPATIENT
Start: 2019-08-06 | End: 2019-08-08 | Stop reason: HOSPADM

## 2019-08-06 RX ORDER — ACETAMINOPHEN 325 MG/1
650 TABLET ORAL EVERY 6 HOURS PRN
Status: DISCONTINUED | OUTPATIENT
Start: 2019-08-06 | End: 2019-08-06 | Stop reason: SDUPTHER

## 2019-08-06 RX ORDER — DEXTROSE MONOHYDRATE 25 G/50ML
12.5 INJECTION, SOLUTION INTRAVENOUS PRN
Status: DISCONTINUED | OUTPATIENT
Start: 2019-08-06 | End: 2019-08-08 | Stop reason: HOSPADM

## 2019-08-06 RX ORDER — SODIUM CHLORIDE 0.9 % (FLUSH) 0.9 %
10 SYRINGE (ML) INJECTION EVERY 12 HOURS SCHEDULED
Status: DISCONTINUED | OUTPATIENT
Start: 2019-08-06 | End: 2019-08-08 | Stop reason: HOSPADM

## 2019-08-06 RX ORDER — SODIUM CHLORIDE 0.9 % (FLUSH) 0.9 %
10 SYRINGE (ML) INJECTION PRN
Status: DISCONTINUED | OUTPATIENT
Start: 2019-08-06 | End: 2019-08-08 | Stop reason: HOSPADM

## 2019-08-06 RX ORDER — NICOTINE POLACRILEX 4 MG
15 LOZENGE BUCCAL PRN
Status: DISCONTINUED | OUTPATIENT
Start: 2019-08-06 | End: 2019-08-08 | Stop reason: HOSPADM

## 2019-08-06 RX ORDER — DEXTROSE MONOHYDRATE 25 G/50ML
25 INJECTION, SOLUTION INTRAVENOUS ONCE
Status: COMPLETED | OUTPATIENT
Start: 2019-08-06 | End: 2019-08-06

## 2019-08-06 RX ORDER — VALPROIC ACID 250 MG/1
250 CAPSULE, LIQUID FILLED ORAL 3 TIMES DAILY
Status: DISCONTINUED | OUTPATIENT
Start: 2019-08-06 | End: 2019-08-07

## 2019-08-06 RX ORDER — AMLODIPINE BESYLATE 5 MG/1
5 TABLET ORAL DAILY
Status: DISCONTINUED | OUTPATIENT
Start: 2019-08-07 | End: 2019-08-08 | Stop reason: HOSPADM

## 2019-08-06 RX ORDER — DEXTROSE MONOHYDRATE 50 MG/ML
100 INJECTION, SOLUTION INTRAVENOUS PRN
Status: DISCONTINUED | OUTPATIENT
Start: 2019-08-06 | End: 2019-08-08 | Stop reason: HOSPADM

## 2019-08-06 RX ORDER — HYDROCODONE BITARTRATE AND ACETAMINOPHEN 5; 325 MG/1; MG/1
1 TABLET ORAL ONCE
Status: DISCONTINUED | OUTPATIENT
Start: 2019-08-06 | End: 2019-08-08 | Stop reason: HOSPADM

## 2019-08-06 RX ORDER — HEPARIN SODIUM 10000 [USP'U]/ML
5000 INJECTION, SOLUTION INTRAVENOUS; SUBCUTANEOUS EVERY 8 HOURS
Status: DISCONTINUED | OUTPATIENT
Start: 2019-08-06 | End: 2019-08-08 | Stop reason: HOSPADM

## 2019-08-06 RX ORDER — IMIPRAMINE HCL 25 MG
50 TABLET ORAL NIGHTLY
Status: DISCONTINUED | OUTPATIENT
Start: 2019-08-06 | End: 2019-08-08 | Stop reason: HOSPADM

## 2019-08-06 RX ORDER — PANTOPRAZOLE SODIUM 40 MG/1
40 TABLET, DELAYED RELEASE ORAL DAILY
Status: DISCONTINUED | OUTPATIENT
Start: 2019-08-07 | End: 2019-08-08 | Stop reason: HOSPADM

## 2019-08-06 RX ORDER — ALLOPURINOL 100 MG/1
100 TABLET ORAL DAILY
Status: DISCONTINUED | OUTPATIENT
Start: 2019-08-07 | End: 2019-08-08 | Stop reason: HOSPADM

## 2019-08-06 RX ORDER — ASPIRIN 81 MG/1
81 TABLET ORAL DAILY
Status: DISCONTINUED | OUTPATIENT
Start: 2019-08-07 | End: 2019-08-08 | Stop reason: HOSPADM

## 2019-08-06 RX ADMIN — DEXTROSE MONOHYDRATE 25 G: 25 INJECTION, SOLUTION INTRAVENOUS at 19:15

## 2019-08-06 RX ADMIN — INSULIN HUMAN 10 UNITS: 100 INJECTION, SOLUTION PARENTERAL at 19:15

## 2019-08-06 RX ADMIN — Medication 1 G: at 19:15

## 2019-08-06 ASSESSMENT — PAIN DESCRIPTION - PAIN TYPE
TYPE: ACUTE PAIN
TYPE: ACUTE PAIN

## 2019-08-06 ASSESSMENT — PAIN SCALES - GENERAL
PAINLEVEL_OUTOF10: 2
PAINLEVEL_OUTOF10: 2

## 2019-08-06 ASSESSMENT — PAIN DESCRIPTION - DESCRIPTORS: DESCRIPTORS: PRESSURE

## 2019-08-06 ASSESSMENT — PAIN DESCRIPTION - LOCATION
LOCATION: CHEST
LOCATION: CHEST

## 2019-08-07 ENCOUNTER — APPOINTMENT (OUTPATIENT)
Dept: NON INVASIVE DIAGNOSTICS | Age: 79
End: 2019-08-07
Payer: MEDICARE

## 2019-08-07 ENCOUNTER — APPOINTMENT (OUTPATIENT)
Dept: NUCLEAR MEDICINE | Age: 79
End: 2019-08-07
Payer: MEDICARE

## 2019-08-07 PROBLEM — I10 ESSENTIAL HYPERTENSION: Chronic | Status: ACTIVE | Noted: 2019-08-07

## 2019-08-07 PROBLEM — R07.9 CHEST PAIN WITH MODERATE RISK FOR CARDIAC ETIOLOGY: Status: RESOLVED | Noted: 2019-08-06 | Resolved: 2019-08-07

## 2019-08-07 PROBLEM — R07.9 CHEST PAIN: Status: ACTIVE | Noted: 2019-08-07

## 2019-08-07 PROBLEM — R07.9 CHEST PAIN WITH MODERATE RISK FOR CARDIAC ETIOLOGY: Status: ACTIVE | Noted: 2019-08-07

## 2019-08-07 PROBLEM — E78.5 HYPERLIPIDEMIA LDL GOAL <100: Chronic | Status: ACTIVE | Noted: 2019-08-07

## 2019-08-07 PROBLEM — R07.9 CHEST PAIN: Status: RESOLVED | Noted: 2017-02-14 | Resolved: 2019-08-07

## 2019-08-07 PROBLEM — R07.9 CHEST PAIN WITH MODERATE RISK FOR CARDIAC ETIOLOGY: Status: RESOLVED | Noted: 2019-08-07 | Resolved: 2019-08-07

## 2019-08-07 PROBLEM — K52.9 COLITIS: Status: RESOLVED | Noted: 2018-04-04 | Resolved: 2019-08-07

## 2019-08-07 PROBLEM — I34.0 MITRAL VALVE INSUFFICIENCY: Status: RESOLVED | Noted: 2017-02-14 | Resolved: 2019-08-07

## 2019-08-07 LAB
ALBUMIN SERPL-MCNC: 4.2 G/DL (ref 3.5–5.2)
ANION GAP SERPL CALCULATED.3IONS-SCNC: 21 MMOL/L (ref 7–16)
ANION GAP SERPL CALCULATED.3IONS-SCNC: 21 MMOL/L (ref 7–16)
BUN BLDV-MCNC: 49 MG/DL (ref 8–23)
BUN BLDV-MCNC: 55 MG/DL (ref 8–23)
CALCIUM SERPL-MCNC: 9.7 MG/DL (ref 8.6–10.2)
CALCIUM SERPL-MCNC: 9.8 MG/DL (ref 8.6–10.2)
CHLORIDE BLD-SCNC: 94 MMOL/L (ref 98–107)
CHLORIDE BLD-SCNC: 95 MMOL/L (ref 98–107)
CHOLESTEROL, TOTAL: 175 MG/DL (ref 0–199)
CO2: 25 MMOL/L (ref 22–29)
CO2: 25 MMOL/L (ref 22–29)
CREAT SERPL-MCNC: 6.4 MG/DL (ref 0.5–1)
CREAT SERPL-MCNC: 6.6 MG/DL (ref 0.5–1)
EKG ATRIAL RATE: 75 BPM
EKG P AXIS: 40 DEGREES
EKG P-R INTERVAL: 162 MS
EKG Q-T INTERVAL: 380 MS
EKG QRS DURATION: 80 MS
EKG QTC CALCULATION (BAZETT): 424 MS
EKG R AXIS: 42 DEGREES
EKG T AXIS: 71 DEGREES
EKG VENTRICULAR RATE: 75 BPM
GFR AFRICAN AMERICAN: 7
GFR AFRICAN AMERICAN: 8
GFR NON-AFRICAN AMERICAN: 6 ML/MIN/1.73
GFR NON-AFRICAN AMERICAN: 6 ML/MIN/1.73
GLUCOSE BLD-MCNC: 77 MG/DL (ref 74–99)
GLUCOSE BLD-MCNC: 78 MG/DL (ref 74–99)
HCT VFR BLD CALC: 33.5 % (ref 34–48)
HDLC SERPL-MCNC: 39 MG/DL
HEMOGLOBIN: 10.7 G/DL (ref 11.5–15.5)
LDL CHOLESTEROL CALCULATED: 100 MG/DL (ref 0–99)
LV EF: 77 %
LVEF MODALITY: NORMAL
MCH RBC QN AUTO: 32 PG (ref 26–35)
MCHC RBC AUTO-ENTMCNC: 31.9 % (ref 32–34.5)
MCV RBC AUTO: 100.3 FL (ref 80–99.9)
METER GLUCOSE: 89 MG/DL (ref 74–99)
METER GLUCOSE: 91 MG/DL (ref 74–99)
PDW BLD-RTO: 16.9 FL (ref 11.5–15)
PHOSPHORUS: 5.4 MG/DL (ref 2.5–4.5)
PLATELET # BLD: 254 E9/L (ref 130–450)
PMV BLD AUTO: 10.7 FL (ref 7–12)
POTASSIUM SERPL-SCNC: 5.7 MMOL/L (ref 3.5–5)
POTASSIUM SERPL-SCNC: 5.8 MMOL/L (ref 3.5–5)
RBC # BLD: 3.34 E12/L (ref 3.5–5.5)
SODIUM BLD-SCNC: 140 MMOL/L (ref 132–146)
SODIUM BLD-SCNC: 141 MMOL/L (ref 132–146)
TRIGL SERPL-MCNC: 181 MG/DL (ref 0–149)
TROPONIN: 0.03 NG/ML (ref 0–0.03)
VLDLC SERPL CALC-MCNC: 36 MG/DL
WBC # BLD: 8.4 E9/L (ref 4.5–11.5)

## 2019-08-07 PROCEDURE — G0378 HOSPITAL OBSERVATION PER HR: HCPCS

## 2019-08-07 PROCEDURE — 6370000000 HC RX 637 (ALT 250 FOR IP): Performed by: NURSE PRACTITIONER

## 2019-08-07 PROCEDURE — 2580000003 HC RX 258: Performed by: NURSE PRACTITIONER

## 2019-08-07 PROCEDURE — 99215 OFFICE O/P EST HI 40 MIN: CPT | Performed by: INTERNAL MEDICINE

## 2019-08-07 PROCEDURE — 6360000002 HC RX W HCPCS: Performed by: NURSE PRACTITIONER

## 2019-08-07 PROCEDURE — 36415 COLL VENOUS BLD VENIPUNCTURE: CPT

## 2019-08-07 PROCEDURE — 90935 HEMODIALYSIS ONE EVALUATION: CPT

## 2019-08-07 PROCEDURE — 82962 GLUCOSE BLOOD TEST: CPT

## 2019-08-07 PROCEDURE — 80069 RENAL FUNCTION PANEL: CPT

## 2019-08-07 PROCEDURE — 80048 BASIC METABOLIC PNL TOTAL CA: CPT

## 2019-08-07 PROCEDURE — 93017 CV STRESS TEST TRACING ONLY: CPT

## 2019-08-07 PROCEDURE — 80061 LIPID PANEL: CPT

## 2019-08-07 PROCEDURE — 85027 COMPLETE CBC AUTOMATED: CPT

## 2019-08-07 PROCEDURE — 78452 HT MUSCLE IMAGE SPECT MULT: CPT

## 2019-08-07 PROCEDURE — G0257 UNSCHED DIALYSIS ESRD PT HOS: HCPCS

## 2019-08-07 PROCEDURE — APPSS60 APP SPLIT SHARED TIME 46-60 MINUTES: Performed by: NURSE PRACTITIONER

## 2019-08-07 PROCEDURE — 93010 ELECTROCARDIOGRAM REPORT: CPT | Performed by: INTERNAL MEDICINE

## 2019-08-07 PROCEDURE — A9500 TC99M SESTAMIBI: HCPCS | Performed by: RADIOLOGY

## 2019-08-07 PROCEDURE — 3430000000 HC RX DIAGNOSTIC RADIOPHARMACEUTICAL: Performed by: RADIOLOGY

## 2019-08-07 PROCEDURE — 84484 ASSAY OF TROPONIN QUANT: CPT

## 2019-08-07 RX ORDER — DIVALPROEX SODIUM 125 MG/1
125 CAPSULE, COATED PELLETS ORAL EVERY 8 HOURS SCHEDULED
Status: DISCONTINUED | OUTPATIENT
Start: 2019-08-07 | End: 2019-08-08 | Stop reason: HOSPADM

## 2019-08-07 RX ADMIN — DIVALPROEX SODIUM 125 MG: 125 CAPSULE, COATED PELLETS ORAL at 06:29

## 2019-08-07 RX ADMIN — Medication 11.2 MILLICURIE: at 14:36

## 2019-08-07 RX ADMIN — Medication 10 ML: at 22:19

## 2019-08-07 RX ADMIN — HEPARIN SODIUM 5000 UNITS: 10000 INJECTION INTRAVENOUS; SUBCUTANEOUS at 23:11

## 2019-08-07 RX ADMIN — REGADENOSON 0.4 MG: 0.08 INJECTION, SOLUTION INTRAVENOUS at 16:13

## 2019-08-07 RX ADMIN — SEVELAMER CARBONATE 800 MG: 800 TABLET, FILM COATED ORAL at 08:50

## 2019-08-07 RX ADMIN — IMIPRAMINE HYDROCHLORIDE 50 MG: 25 TABLET ORAL at 22:17

## 2019-08-07 RX ADMIN — IMIPRAMINE HYDROCHLORIDE 50 MG: 25 TABLET ORAL at 00:52

## 2019-08-07 RX ADMIN — Medication 10 ML: at 00:52

## 2019-08-07 RX ADMIN — PANTOPRAZOLE SODIUM 40 MG: 40 TABLET, DELAYED RELEASE ORAL at 08:49

## 2019-08-07 RX ADMIN — Medication 30.5 MILLICURIE: at 16:15

## 2019-08-07 RX ADMIN — HEPARIN SODIUM 5000 UNITS: 10000 INJECTION INTRAVENOUS; SUBCUTANEOUS at 08:50

## 2019-08-07 RX ADMIN — ASPIRIN 81 MG: 81 TABLET ORAL at 08:50

## 2019-08-07 RX ADMIN — Medication 10 ML: at 08:51

## 2019-08-07 RX ADMIN — DIVALPROEX SODIUM 125 MG: 125 CAPSULE, COATED PELLETS ORAL at 00:52

## 2019-08-07 RX ADMIN — HEPARIN SODIUM 5000 UNITS: 10000 INJECTION INTRAVENOUS; SUBCUTANEOUS at 00:52

## 2019-08-07 RX ADMIN — AMLODIPINE BESYLATE 5 MG: 5 TABLET ORAL at 08:49

## 2019-08-07 RX ADMIN — ALLOPURINOL 100 MG: 100 TABLET ORAL at 08:49

## 2019-08-07 RX ADMIN — DIVALPROEX SODIUM 125 MG: 125 CAPSULE, COATED PELLETS ORAL at 22:18

## 2019-08-07 ASSESSMENT — PAIN SCALES - GENERAL
PAINLEVEL_OUTOF10: 0

## 2019-08-07 NOTE — CONSULTS
Inpatient Cardiology Consultation      Reason for Consult:  CP    Consulting Physician: Dr. Fernando Soto    Requesting Physician:  Dr. Mo Nieves    Date of Consultation: 8/7/2019    HISTORY OF PRESENT ILLNESS:   Ms. Miquel Singh is a 70year old female who is previously known to Dr. Evelia Renee; most recently evaluated in outpatient in 12/10/2018 for a history of AS s/p aortic valve replacement surgery with un-claudia of the anomalous RCA and modified MAZE procedure with bipolar radiofrequency ablation for paroxysmal atrial fibrillation, 7/30/2014. No cardiac testing was needed at that time. Ms. Miquel Singh presented to [facility] on 8/6/2019 with complaints of on/off \"tightness/achiness\" over old sternal incision that started shortly after Aortic valve replacement (2014). She reported having more frequent episodes of discomfort since 8/2/2019, occurring at rest, with \"sweeping the floors, lifting her arms, and moving from side to side, episodes lasting 2-3 hours, relieved with OTC topical Aloe vera gel. Due to worsening symptoms, she presented to [facility] for further evaluation. Upon arrival to the ED: Troponin 0.04, 0.03, 0.03. Triglycerides 181, , Na+ 139, K+ 5.4, BUN/Cr 41/5.6, Mg++ 2.3, WBC 7.3, H/H 11.7/36.7, platelet count 663. CXR: cardiomegaly, no acute infiltrate. VS: /86, HR 81, Afebrile, RA. Please note: past medical records were reviewed per electronic medical record (EMR) - see detailed reports under Past Medical/ Surgical History. Past Medical History:    1. Hypertension. 2. Hyperlipidemia. 3. Coronary artery disease:   a. 12/21/2010 Cardiac catheterization: No significant epicardial coronary artery disease. Systemic hypertension. Concentric left ventricular hypertrophy with hyperdynamic left ventricular systolic function. Elevated left ventricular end diastolic pressure. Trace aortic regurgitation.    b.  7/11/2014 Right and left heart catheterization:  Severe calcific aortic stenosis.  Systemic history. 2. Anemia. 3. Chronic kidney disease (Stage 5) likely secondary to a hypertensive nephrosclerosis. a.  1/8/2008 Insertion of AV fistula, left arm.  b.  Secondary hyperparathyroidism. c.  Patient is on hemodialysis 3 days a week.          4.   Bipolar disorder. Psychosis. Anxiety. Depression. 5.   Gout. 6.  S/P partial hysterectomy (S/P 3 vaginal deliveries and 1 miscarriage). 7.  Left hip hemiarthroplasty on 9/8/2014 for left transcervical femoral neck displaced fracture, S/P fall        on 9/6/2014.    8. Hypercalcemia   9. March 13, 2015 screening colonoscopy and EGD with biopsy showed gastritis and mild sigmoid diverticulosis. 10. Hospitalized in 4/2018 with diarrhea and was diagnosed with colitis. Medications Prior to admit:  Prior to Admission medications    Medication Sig Start Date End Date Taking? Authorizing Provider   CALCIUM MAGNESIUM 750 PO Take by mouth daily    Yes Historical Provider, MD   eszopiclone (LUNESTA) 2 MG TABS Take 2 mg by mouth nightly. .   Yes Historical Provider, MD   Apoaequorin (PREVAGEN PO) Take by mouth daily   Yes Historical Provider, MD   Cinnamon 500 MG TABS Take by mouth daily    Yes Historical Provider, MD   RENVELA 800 MG tablet Take 800 mg by mouth daily  12/22/16  Yes Historical Provider, MD   aspirin 81 MG tablet Take 81 mg by mouth daily   Yes Historical Provider, MD   Multiple Vitamins-Minerals (THERAPEUTIC MULTIVITAMIN-MINERALS) tablet Take 1 tablet by mouth daily   Yes Historical Provider, MD   pantoprazole (PROTONIX) 40 MG tablet Take 1 tablet by mouth daily. 3/13/15  Yes GIANNA Mora   amLODIPine (NORVASC) 5 MG tablet Take 5 mg by mouth daily    Yes Historical Provider, MD   acetaminophen (TYLENOL) 325 MG tablet Take 650 mg by mouth every 6 hours as needed for Pain. Yes Historical Provider, MD   magnesium hydroxide (MILK OF MAGNESIA) 400 MG/5ML suspension Take 30 mLs by mouth daily as needed for Constipation.  9/11/14  Yes Dereck MD Maria Fernanda   docusate (COLACE, DULCOLAX) 100 MG CAPS Take 100 mg by mouth 2 times daily as needed. 8/5/14  Yes Yumi Hampton DO   allopurinol (ZYLOPRIM) 100 MG tablet Take 100 mg by mouth daily. Yes Historical Provider, MD   imipramine (TOFRANIL) 50 MG tablet Take 50 mg by mouth nightly. Yes Historical Provider, MD   valproic acid (DEPAKENE) 250 MG capsule Take 250 mg by mouth 3 times daily 1 in a.m and 2 at bedtime   Yes Historical Provider, MD       Current Medications:    Current Facility-Administered Medications: divalproex (DEPAKOTE SPRINKLE) capsule 125 mg, 125 mg, Oral, 3 times per day  HYDROcodone-acetaminophen (NORCO) 5-325 MG per tablet 1 tablet, 1 tablet, Oral, Once  glucose (GLUTOSE) 40 % oral gel 15 g, 15 g, Oral, PRN  dextrose 50 % IV solution, 12.5 g, Intravenous, PRN  glucagon (rDNA) injection 1 mg, 1 mg, Intramuscular, PRN  dextrose 5 % solution, 100 mL/hr, Intravenous, PRN  acetaminophen (TYLENOL) tablet 650 mg, 650 mg, Oral, Q6H PRN  allopurinol (ZYLOPRIM) tablet 100 mg, 100 mg, Oral, Daily  amLODIPine (NORVASC) tablet 5 mg, 5 mg, Oral, Daily  aspirin EC tablet 81 mg, 81 mg, Oral, Daily  imipramine (TOFRANIL) tablet 50 mg, 50 mg, Oral, Nightly  pantoprazole (PROTONIX) tablet 40 mg, 40 mg, Oral, Daily  sevelamer (RENVELA) tablet 800 mg, 800 mg, Oral, Daily  sodium chloride flush 0.9 % injection 10 mL, 10 mL, Intravenous, 2 times per day  sodium chloride flush 0.9 % injection 10 mL, 10 mL, Intravenous, PRN  ondansetron (ZOFRAN) injection 4 mg, 4 mg, Intravenous, Q6H PRN  heparin (porcine) injection 5,000 Units, 5,000 Units, Subcutaneous, Q8H    Allergies: Other    Social History:    Denies using a walker or cane. Lifetime nonsmoker. Hx of second hand smoking. Denies alcohol and illicit drug use. Family History: Noncontributory due to advanced age. REVIEW OF SYSTEMS:     · Constitutional: + fatigue.  Denies fevers, chills or night sweats  · Eyes: Denies visual changes or

## 2019-08-07 NOTE — CONSULTS
CALCIUM 9.8 08/07/2019     Ionized Calcium:  No results found for: IONCA  Magnesium:    Lab Results   Component Value Date    MG 2.3 08/06/2019     Phosphorus:    Lab Results   Component Value Date    PHOS 5.4 08/07/2019     LDH:  No results found for: LDH  Uric Acid:  No results found for: LABURIC, URICACID  PT/INR:    Lab Results   Component Value Date    PROTIME 11.9 09/07/2014    PROTIME 12.5 12/21/2010    INR 1.1 09/07/2014     Warfarin PT/INR:  No components found for: PTPATWAR, PTINRWAR  PTT:    Lab Results   Component Value Date    APTT 34.2 09/07/2014   [APTT}  Last 3 Troponin:    Lab Results   Component Value Date    TROPONINI 0.03 08/07/2019    TROPONINI 0.03 08/07/2019    TROPONINI 0.03 08/06/2019     U/A:    Lab Results   Component Value Date    COLORU Yellow 04/05/2018    PROTEINU TRACE 04/05/2018    PHUR 8.0 04/05/2018    WBCUA 2-5 04/05/2018    WBCUA NONE 02/09/2011    RBCUA 0-1 04/05/2018    RBCUA NONE 07/07/2013    BACTERIA RARE 04/05/2018    CLARITYU Clear 04/05/2018    SPECGRAV 1.010 04/05/2018    LEUKOCYTESUR SMALL 04/05/2018    UROBILINOGEN 0.2 04/05/2018    BILIRUBINUR Negative 04/05/2018    BILIRUBINUR NEGATIVE 02/09/2011    BLOODU Negative 04/05/2018    GLUCOSEU Negative 04/05/2018    GLUCOSEU NEGATIVE 02/09/2011     ABG:    Lab Results   Component Value Date    PH 7.311 07/30/2014    PCO2 51.4 07/30/2014    PO2 80.3 07/30/2014    HCO3 25.4 07/30/2014    BE -1.2 07/30/2014    O2SAT 95.5 07/30/2014     VBG:  No results found for: PHVEN, ECV6PJB, BEVEN, K7SHAAEV  HgBA1c:    Lab Results   Component Value Date    LABA1C 5.4 07/14/2014     Microalbumen/Creatinine ratio:  No components found for: RUCREAT  FLP:    Lab Results   Component Value Date    TRIG 181 08/07/2019    HDL 39 08/07/2019    LDLCALC 100 08/07/2019    LABVLDL 36 08/07/2019     TSH:  No results found for: TSH  VITAMIN B12: No components found for: B12  FOLATE:  No results found for: FOLATE  IRON:    Lab Results   Component

## 2019-08-07 NOTE — H&P
[address] [facility]  Attending History and Physical      CHIEF COMPLAINT:  Chest pain      HISTORY OF PRESENT ILLNESS:      The patient is a 70 y.o. female patient of visiting physicians who presents with complains of chest pain. Patient states she developed pain in her chest Friday prior to presentation. The pain was worsened by subtle chest/arm movements. She has chronic pain in the middle of her chest from AVR. She states the pain was gone on Monday when she went to HD. The symptoms returned on Tuesday. She did not take anything at home for the symptoms. She denied shortness of breath, abdominal pain, nausea, vomiting, fevers, chills and diaphoresis. Her symptoms have resolved. Past Medical History:    Past Medical History:   Diagnosis Date    Anemia     Aortic stenosis, severe 6/25/14    Arthritis     Bipolar disorder (HCC)     Psychosis, anxiety, depression.  CAD (coronary artery disease)     Carotid bruit 10/11/2012    Chronic kidney disease     CRF (chronic renal failure) 4/10/2012    Depression     Gout     Hemodialysis patient ([address]     kiki alanis sat    Hyperlipidemia     Hypertension     Left displaced femoral neck fracture ([address] 10/13/2014    Murmur, heart     Psychosis ([address]     Secondary hyperparathyroidism ([address]     Shingles        Past Surgical History:    Past Surgical History:   Procedure Laterality Date    AORTIC VALVE REPLACEMENT  8/5/14    Dr Sheryle Kub [facility] 5  4-30-08    Rev AVF wtih vein patch    COLONOSCOPY  3/15    DIAGNOSTIC CARDIAC CATH LAB PROCEDURE  12/21/2010    No significant epicardial CAD. Systemic HTN. Concentric left ventricular hypertrophy with hyperdynamic left ventricular systolic function. Elevated left ventricular end diastolic pressure. Trace aortic regurgitation.       DIALYSIS FISTULA CREATION  1-8-08    L AVF Aydee [facility])    FRACTURE SURGERY  9/8/14    left hip hemiarthroplasty    HYSTERECTOMY      Partial (S/P 3 vaginal deliveries and 1 miscarriage).  TRANSTHORACIC ECHOCARDIOGRAM  6/19/2012    Normal LV size, wall thickness, wall motion and systolic function with stage 1 left ventricular diastolic dysfunction, normal right ventricular size and function, moderate aortic valve sclerosis with mild to moderate aortic stenosis, trace aortic regurgitation and there is aortic root sclerosis/calcification.  UPPER GASTROINTESTINAL ENDOSCOPY  3/15       Medications Prior to Admission:    Medications Prior to Admission: CALCIUM MAGNESIUM 750 PO, Take by mouth daily   eszopiclone (LUNESTA) 2 MG TABS, Take 2 mg by mouth nightly. Shantanu Dials RENVELA 800 MG tablet, Take 800 mg by mouth daily   aspirin 81 MG tablet, Take 81 mg by mouth daily  Multiple Vitamins-Minerals (THERAPEUTIC MULTIVITAMIN-MINERALS) tablet, Take 1 tablet by mouth daily  pantoprazole (PROTONIX) 40 MG tablet, Take 1 tablet by mouth daily. amLODIPine (NORVASC) 5 MG tablet, Take 5 mg by mouth daily   acetaminophen (TYLENOL) 325 MG tablet, Take 650 mg by mouth every 6 hours as needed for Pain. docusate (COLACE, DULCOLAX) 100 MG CAPS, Take 100 mg by mouth 2 times daily as needed. allopurinol (ZYLOPRIM) 100 MG tablet, Take 100 mg by mouth daily. imipramine (TOFRANIL) 50 MG tablet, Take 50 mg by mouth nightly. valproic acid (DEPAKENE) 250 MG capsule, Take 250 mg by mouth 3 times daily 1 in a.m and 2 at bedtime  [DISCONTINUED] Apoaequorin (PREVAGEN PO), Take by mouth daily  [DISCONTINUED] Cinnamon 500 MG TABS, Take by mouth daily   [DISCONTINUED] magnesium hydroxide (MILK OF MAGNESIA) 400 MG/5ML suspension, Take 30 mLs by mouth daily as needed for Constipation. Allergies: Other    Social History:    reports that she has never smoked. She has never used smokeless tobacco. She reports that she does not drink alcohol or use drugs. Family History:   family history is not on file.     REVIEW OF SYSTEMS:  As above in the HPI, otherwise negative    PHYSICAL EXAM:    Vitals:  /64   Pulse 71   Temp 98.4 °F (36.9 °C) (Temporal)   Resp 16   Ht 5' 3\" (1.6 m)   Wt 120 lb (54.4 kg)   SpO2 98%   BMI 21.26 kg/m²     General:  Awake, alert, oriented X 3. Well developed, well nourished, well groomed. No apparent distress. HEENT:  Normocephalic, atraumatic. Pupils equal, round, reactive to light. No scleral icterus. No conjunctival injection. Normal lips, teeth, and gums. No nasal discharge. Neck:  Supple  Heart:  RRR, no murmurs, gallops, rubs  Lungs:  CTA bilaterally, bilat symmetrical expansion, no wheeze, rales, or rhonchi  Abdomen:   Bowel sounds present, soft, nontender, no masses, no organomegaly, no peritoneal signs  Extremities:  No clubbing, cyanosis, or edema  Skin:  Warm and dry, no open lesions or rash  Neuro:  Cranial nerves 2-12 intact, no focal deficits  Breast: deferred  Rectal: deferred  Genitalia:  deferred    LABS:    CBC with Differential:    Lab Results   Component Value Date    WBC 8.4 08/07/2019    RBC 3.34 08/07/2019    HGB 10.7 08/07/2019    HCT 33.5 08/07/2019     08/07/2019    .3 08/07/2019    MCH 32.0 08/07/2019    MCHC 31.9 08/07/2019    RDW 16.9 08/07/2019    SEGSPCT 57 07/07/2013    LYMPHOPCT 30.6 08/06/2019    MONOPCT 10.2 08/06/2019    BASOPCT 0.5 08/06/2019    MONOSABS 0.74 08/06/2019    LYMPHSABS 2.23 08/06/2019    EOSABS 0.18 08/06/2019    BASOSABS 0.04 08/06/2019     CMP:    Lab Results   Component Value Date     08/07/2019     08/07/2019    K 5.7 08/07/2019    K 5.8 08/07/2019    CL 95 08/07/2019    CL 94 08/07/2019    CO2 25 08/07/2019    CO2 25 08/07/2019    BUN 55 08/07/2019    BUN 49 08/07/2019    CREATININE 6.6 08/07/2019    CREATININE 6.4 08/07/2019    GFRAA 7 08/07/2019    GFRAA 8 08/07/2019    LABGLOM 6 08/07/2019    LABGLOM 6 08/07/2019    GLUCOSE 77 08/07/2019    GLUCOSE 78 08/07/2019    GLUCOSE 81 01/17/2012    PROT 7.3 08/06/2019    LABALBU 4.2 08/07/2019 LABALBU 4.0 02/09/2011    CALCIUM 9.7 08/07/2019    CALCIUM 9.8 08/07/2019    BILITOT 0.3 08/06/2019    ALKPHOS 75 08/06/2019    AST 14 08/06/2019    ALT <5 08/06/2019     BMP:    Lab Results   Component Value Date     08/07/2019     08/07/2019    K 5.7 08/07/2019    K 5.8 08/07/2019    CL 95 08/07/2019    CL 94 08/07/2019    CO2 25 08/07/2019    CO2 25 08/07/2019    BUN 55 08/07/2019    BUN 49 08/07/2019    LABALBU 4.2 08/07/2019    LABALBU 4.0 02/09/2011    CREATININE 6.6 08/07/2019    CREATININE 6.4 08/07/2019    CALCIUM 9.7 08/07/2019    CALCIUM 9.8 08/07/2019    GFRAA 7 08/07/2019    GFRAA 8 08/07/2019    LABGLOM 6 08/07/2019    LABGLOM 6 08/07/2019    GLUCOSE 77 08/07/2019    GLUCOSE 78 08/07/2019    GLUCOSE 81 01/17/2012     Magnesium:    Lab Results   Component Value Date    MG 2.3 08/06/2019     Phosphorus:    Lab Results   Component Value Date    PHOS 5.4 08/07/2019     PT/INR:    Lab Results   Component Value Date    PROTIME 11.9 09/07/2014    PROTIME 12.5 12/21/2010    INR 1.1 09/07/2014     PTT:    Lab Results   Component Value Date    APTT 34.2 09/07/2014   [APTT}  Troponin:    Lab Results   Component Value Date    TROPONINI 0.03 08/07/2019     Last 3 Troponin:    Lab Results   Component Value Date    TROPONINI 0.03 08/07/2019    TROPONINI 0.03 08/06/2019    TROPONINI 0.04 08/06/2019     U/A:    Lab Results   Component Value Date    COLORU Yellow 04/05/2018    PROTEINU TRACE 04/05/2018    PHUR 8.0 04/05/2018    WBCUA 2-5 04/05/2018    WBCUA NONE 02/09/2011    RBCUA 0-1 04/05/2018    RBCUA NONE 07/07/2013    BACTERIA RARE 04/05/2018    CLARITYU Clear 04/05/2018    SPECGRAV 1.010 04/05/2018    LEUKOCYTESUR SMALL 04/05/2018    UROBILINOGEN 0.2 04/05/2018    BILIRUBINUR Negative 04/05/2018    BILIRUBINUR NEGATIVE 02/09/2011    BLOODU Negative 04/05/2018    GLUCOSEU Negative 04/05/2018    GLUCOSEU NEGATIVE 02/09/2011     HgBA1c:    Lab Results   Component Value Date    LABA1C 5.4 07/14/2014 FLP:    Lab Results   Component Value Date    TRIG 181 08/07/2019    HDL 39 08/07/2019    LDLCALC 100 08/07/2019    LABVLDL 36 08/07/2019     TSH:  No results found for: TSH    ASSESSMENT:      Patient Active Problem List   Diagnosis    CAD (coronary artery disease)    Hyperparathyroidism due to renal insufficiency (HCC)    Gout    [facility] disorder ([facility]    Coronary atherosclerosis of native coronary artery    S/P AVR    PAF (paroxysmal atrial fibrillation) ([facility]    S/P Maze operation for atrial fibrillation    ESRD (end stage renal disease) on dialysis ([facility]    Hyperkalemia, diminished renal excretion    Chest pain    Hyperlipidemia LDL goal <100    Essential hypertension         PLAN:    Continue medical management of ASCAD. Stable. Stable. Continue psychiatric medications. Continue medical management of ASCAD. Stable. Rate controlled. Stable. Nephrology consulted for HD. Stable. Rule out ACS. EKG and enzymes negative. Atypical symptoms. Will stress. Cardiology consulted. Continue aggressive lipid therapy  Blood pressure ok, continue current medications  Discharge once cardiac ischemic evaluation complete.     Tania Colindres MD  12:26 PM  8/7/2019

## 2019-08-08 VITALS
BODY MASS INDEX: 20.55 KG/M2 | WEIGHT: 115.96 LBS | TEMPERATURE: 98.5 F | DIASTOLIC BLOOD PRESSURE: 60 MMHG | RESPIRATION RATE: 16 BRPM | HEIGHT: 63 IN | HEART RATE: 84 BPM | SYSTOLIC BLOOD PRESSURE: 120 MMHG | OXYGEN SATURATION: 98 %

## 2019-08-08 LAB
ALBUMIN SERPL-MCNC: 3.8 G/DL (ref 3.5–5.2)
ANION GAP SERPL CALCULATED.3IONS-SCNC: 16 MMOL/L (ref 7–16)
BUN BLDV-MCNC: 25 MG/DL (ref 8–23)
CALCIUM SERPL-MCNC: 9 MG/DL (ref 8.6–10.2)
CHLORIDE BLD-SCNC: 95 MMOL/L (ref 98–107)
CO2: 28 MMOL/L (ref 22–29)
CREAT SERPL-MCNC: 4.4 MG/DL (ref 0.5–1)
GFR AFRICAN AMERICAN: 12
GFR NON-AFRICAN AMERICAN: 10 ML/MIN/1.73
GLUCOSE BLD-MCNC: 88 MG/DL (ref 74–99)
HCT VFR BLD CALC: 31.2 % (ref 34–48)
HEMOGLOBIN: 10.1 G/DL (ref 11.5–15.5)
MCH RBC QN AUTO: 32.3 PG (ref 26–35)
MCHC RBC AUTO-ENTMCNC: 32.4 % (ref 32–34.5)
MCV RBC AUTO: 99.7 FL (ref 80–99.9)
METER GLUCOSE: 89 MG/DL (ref 74–99)
METER GLUCOSE: 95 MG/DL (ref 74–99)
PDW BLD-RTO: 17.3 FL (ref 11.5–15)
PHOSPHORUS: 4.1 MG/DL (ref 2.5–4.5)
PLATELET # BLD: 249 E9/L (ref 130–450)
PMV BLD AUTO: 10.4 FL (ref 7–12)
POTASSIUM SERPL-SCNC: 4.2 MMOL/L (ref 3.5–5)
RBC # BLD: 3.13 E12/L (ref 3.5–5.5)
SODIUM BLD-SCNC: 139 MMOL/L (ref 132–146)
WBC # BLD: 6.2 E9/L (ref 4.5–11.5)

## 2019-08-08 PROCEDURE — 6360000002 HC RX W HCPCS: Performed by: NURSE PRACTITIONER

## 2019-08-08 PROCEDURE — G0378 HOSPITAL OBSERVATION PER HR: HCPCS

## 2019-08-08 PROCEDURE — 36415 COLL VENOUS BLD VENIPUNCTURE: CPT

## 2019-08-08 PROCEDURE — 80069 RENAL FUNCTION PANEL: CPT

## 2019-08-08 PROCEDURE — 85027 COMPLETE CBC AUTOMATED: CPT

## 2019-08-08 PROCEDURE — 82962 GLUCOSE BLOOD TEST: CPT

## 2019-08-08 PROCEDURE — 6370000000 HC RX 637 (ALT 250 FOR IP): Performed by: NURSE PRACTITIONER

## 2019-08-08 RX ADMIN — HEPARIN SODIUM 5000 UNITS: 10000 INJECTION INTRAVENOUS; SUBCUTANEOUS at 06:35

## 2019-08-08 RX ADMIN — DIVALPROEX SODIUM 125 MG: 125 CAPSULE, COATED PELLETS ORAL at 06:36

## 2019-08-08 ASSESSMENT — PAIN SCALES - GENERAL
PAINLEVEL_OUTOF10: 0
PAINLEVEL_OUTOF10: 0

## 2019-08-16 ASSESSMENT — ENCOUNTER SYMPTOMS
CHEST TIGHTNESS: 0
SHORTNESS OF BREATH: 0

## 2019-08-16 NOTE — ED PROVIDER NOTES
3.5 - 5.2 g/dL    Total Bilirubin 0.3 0.0 - 1.2 mg/dL    Alkaline Phosphatase 75 35 - 104 U/L    ALT <5 0 - 32 U/L    AST 14 0 - 31 U/L   Troponin   Result Value Ref Range    Troponin 0.04 (H) 0.00 - 0.03 ng/mL   Magnesium   Result Value Ref Range    Magnesium 2.3 1.6 - 2.6 mg/dL   Phosphorus   Result Value Ref Range    Phosphorus 4.2 2.5 - 4.5 mg/dL   Troponin   Result Value Ref Range    Troponin 0.03 0.00 - 0.03 ng/mL   Troponin   Result Value Ref Range    Troponin 0.03 0.00 - 0.03 ng/mL   CBC   Result Value Ref Range    WBC 8.4 4.5 - 11.5 E9/L    RBC 3.34 (L) 3.50 - 5.50 E12/L    Hemoglobin 10.7 (L) 11.5 - 15.5 g/dL    Hematocrit 33.5 (L) 34.0 - 48.0 %    .3 (H) 80.0 - 99.9 fL    MCH 32.0 26.0 - 35.0 pg    MCHC 31.9 (L) 32.0 - 34.5 %    RDW 16.9 (H) 11.5 - 15.0 fL    Platelets 911 582 - 967 E9/L    MPV 10.7 7.0 - 12.0 fL   Renal function panel   Result Value Ref Range    Sodium 141 132 - 146 mmol/L    Potassium 5.7 (H) 3.5 - 5.0 mmol/L    Chloride 95 (L) 98 - 107 mmol/L    CO2 25 22 - 29 mmol/L    Anion Gap 21 (H) 7 - 16 mmol/L    Glucose 77 74 - 99 mg/dL    BUN 55 (H) 8 - 23 mg/dL    CREATININE 6.6 (H) 0.5 - 1.0 mg/dL    GFR Non-African American 6 >=60 mL/min/1.73    GFR African American 7     Calcium 9.7 8.6 - 10.2 mg/dL    Phosphorus 5.4 (H) 2.5 - 4.5 mg/dL    Alb 4.2 3.5 - 5.2 g/dL   Lipid panel - fasting   Result Value Ref Range    Cholesterol, Total 175 0 - 199 mg/dL    Triglycerides 181 (H) 0 - 149 mg/dL    HDL 39 >40 mg/dL    LDL Calculated 100 (H) 0 - 99 mg/dL    VLDL Cholesterol Calculated 36 mg/dL   Troponin   Result Value Ref Range    Troponin 0.03 0.00 - 0.03 ng/mL   Basic metabolic panel   Result Value Ref Range    Sodium 140 132 - 146 mmol/L    Potassium 5.8 (H) 3.5 - 5.0 mmol/L    Chloride 94 (L) 98 - 107 mmol/L    CO2 25 22 - 29 mmol/L    Anion Gap 21 (H) 7 - 16 mmol/L    Glucose 78 74 - 99 mg/dL    BUN 49 (H) 8 - 23 mg/dL    CREATININE 6.4 (H) 0.5 - 1.0 mg/dL    GFR Non-African American 6

## 2019-08-20 ENCOUNTER — HOSPITAL ENCOUNTER (OUTPATIENT)
Dept: GENERAL RADIOLOGY | Age: 79
Discharge: HOME | End: 2019-08-22
Payer: MEDICARE

## 2019-08-20 DIAGNOSIS — Z12.39 BREAST CANCER SCREENING: ICD-10-CM

## 2019-08-20 PROCEDURE — 77063 BREAST TOMOSYNTHESIS BI: CPT

## 2020-01-10 ENCOUNTER — OFFICE VISIT (OUTPATIENT)
Dept: CARDIOLOGY CLINIC | Age: 80
End: 2020-01-10
Payer: MEDICARE

## 2020-01-10 VITALS
HEIGHT: 63 IN | DIASTOLIC BLOOD PRESSURE: 52 MMHG | SYSTOLIC BLOOD PRESSURE: 104 MMHG | OXYGEN SATURATION: 97 % | HEART RATE: 86 BPM | RESPIRATION RATE: 20 BRPM | WEIGHT: 120.2 LBS | BODY MASS INDEX: 21.3 KG/M2

## 2020-01-10 PROCEDURE — 1036F TOBACCO NON-USER: CPT | Performed by: INTERNAL MEDICINE

## 2020-01-10 PROCEDURE — 4040F PNEUMOC VAC/ADMIN/RCVD: CPT | Performed by: INTERNAL MEDICINE

## 2020-01-10 PROCEDURE — G8420 CALC BMI NORM PARAMETERS: HCPCS | Performed by: INTERNAL MEDICINE

## 2020-01-10 PROCEDURE — G8427 DOCREV CUR MEDS BY ELIG CLIN: HCPCS | Performed by: INTERNAL MEDICINE

## 2020-01-10 PROCEDURE — 1090F PRES/ABSN URINE INCON ASSESS: CPT | Performed by: INTERNAL MEDICINE

## 2020-01-10 PROCEDURE — 99214 OFFICE O/P EST MOD 30 MIN: CPT | Performed by: INTERNAL MEDICINE

## 2020-01-10 PROCEDURE — G8399 PT W/DXA RESULTS DOCUMENT: HCPCS | Performed by: INTERNAL MEDICINE

## 2020-01-10 PROCEDURE — 93000 ELECTROCARDIOGRAM COMPLETE: CPT | Performed by: INTERNAL MEDICINE

## 2020-01-10 PROCEDURE — G8484 FLU IMMUNIZE NO ADMIN: HCPCS | Performed by: INTERNAL MEDICINE

## 2020-01-10 PROCEDURE — 1123F ACP DISCUSS/DSCN MKR DOCD: CPT | Performed by: INTERNAL MEDICINE

## 2020-01-10 RX ORDER — NITROGLYCERIN 0.4 MG/1
0.4 TABLET SUBLINGUAL EVERY 5 MIN PRN
COMMUNITY

## 2020-01-10 RX ORDER — VITAMIN B COMPLEX
1 CAPSULE ORAL
Status: ON HOLD | COMMUNITY
End: 2022-08-27

## 2020-01-13 NOTE — PROGRESS NOTES
OFFICE VISIT        PRIMARY CARE PHYSICIAN:    No primary care provider on file. ALLERGIES / SENSITIVITIES:    Allergies   Allergen Reactions    Other      Pt states she cannot take any type of pain medication, she throws up and never stops        REVIEWED MEDICATIONS:      Current Outpatient Medications:     nitroGLYCERIN (NITROSTAT) 0.4 MG SL tablet, Place 0.4 mg under the tongue every 5 minutes as needed for Chest pain up to max of 3 total doses. If no relief after 1 dose, call 911., Disp: , Rfl:     b complex vitamins capsule, Take 1 capsule by mouth three times a week, Disp: , Rfl:     eszopiclone (LUNESTA) 2 MG TABS, Take 2 mg by mouth nightly. ., Disp: , Rfl:     RENVELA 800 MG tablet, Take 800 mg by mouth 3 times daily , Disp: , Rfl: 3    aspirin 81 MG tablet, Take 81 mg by mouth daily, Disp: , Rfl:     Multiple Vitamins-Minerals (THERAPEUTIC MULTIVITAMIN-MINERALS) tablet, Take 1 tablet by mouth daily, Disp: , Rfl:     pantoprazole (PROTONIX) 40 MG tablet, Take 1 tablet by mouth daily. , Disp: 30 tablet, Rfl: 10    amLODIPine (NORVASC) 5 MG tablet, Take 5 mg by mouth daily , Disp: , Rfl:     acetaminophen (TYLENOL) 325 MG tablet, Take 650 mg by mouth every 6 hours as needed for Pain., Disp: , Rfl:     docusate (COLACE, DULCOLAX) 100 MG CAPS, Take 100 mg by mouth 2 times daily as needed. 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 Tana Curry is a pleasant, 29-year-old lady with cardiovascular history as described below.   She was hospitalized in 8/2019 for chest pain that she says she has had since her surgery, but that had gotten somewhat worse at that time, for which she sought medical advice. The chest pain was considered musculoskeletal.  She did have an elevated first troponin and had a stress test, which was negative. She returns today for follow up. She denies any significant/exertional chest pain. She denies any significant dyspnea with her daily activities. She denies orthopnea, PND's or significant lower extremity swelling. She denies palpitations, dizziness, presyncope or syncope. REVIEW OF SYSTEMS:     Constitutional: no fevers or chills or fatigue              HEENT: denies changes in hearing or vision, No difficulty swallowing              Endocrine: no weight changes              Musculoskeletal: no arthralgias or myalgias              Skin: denies rashes or itching or lesions              Heme/Lymph: denies bleeding              Heart: as above              Lungs: as above              GI: denies abdominal pain, vomiting, diarrhea or rectal bleeding or tarry stools              : denies hematuria, dysuria              Psych: denies mood changed, anxiety, depression.              Neuro: denies numbness, tingling, tremors.                    CARDIOVASCULAR HISTORY:      1. Hypertension. 2. Hyperlipidemia. 3. Coronary artery disease:  a. 12/21/2010 Cardiac catheterization: No significant epicardial coronary artery disease. Systemic hypertension. Concentric left ventricular hypertrophy with hyperdynamic left ventricular systolic function. Elevated left ventricular end diastolic pressure. Trace aortic regurgitation. b.  7/11/2014 Right and left heart catheterization:  Severe calcific aortic stenosis.  Systemic hypertension.  Patent coronary arteries.  Patent carotid arteries.  Normal right sided pressures. c.  Willia Bran nuclear stress test done on 8/7/2019 showed no evidence of fixed or reversible perfusion abnormality with normal wall motion and a calculated ejection fraction of 77%.     4. Severe aortic stenosis.             a.  6/25/2014 Echocardiogram showed normal left ventricular size, wall motion and systolic                             function with Stage 1 left ventricular diastolic dysfunction. Normal right ventricular size and               function. Moderate aortic valve sclerosis with severe aortic stenosis and mild aortic regurgitation.              b.  Aortic valve mean gradient of 47 mmHg with an aortic valve area of 0.65 sq cm on cardiac               catheterization on 7/11/2014.               c.  Aortic valve replacement with a 21 mm [employer] Mosaic bioprosthesis tissue valve, 7/30/2014.                    d.  Echocardiogram done on 03/17/2016 showed normal left ventricular size with borderline left ventricular hypertrophy with an ejection fraction of 75% with stage 1 left ventricular diastolic dysfunction. Normal right ventricular        size and function. Mildly dilated left atrium. Mild mitral annular calcification with mild to moderate mitral regurgitation. Bioprosthesis in the aortic position, which is well seated, but the individual aortic leaflets were not well        visualized with a calculated mean gradient across the aortic prosthesis of 25.25 mmHg with no aortic regurgitation. Sclerotic and calcified aortic root. 5. Unroofing of anomalous right coronary artery at the time of the aortic valve surgery, 7/30/2014. 6. Paroxysmal atrial fibrillation. a. Modified Maze procedure and bipolar radiofrequency ablation, 7/30/2014.    7. Carotid ultrasound done on 5/7/2014 was read by Dr. Nate Kay (Vascular surgery) as showing 30% stenosis on the right and no stenosis on the left. 8. Paroxysmal atrial fibrillation. 9. 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 PAST MEDICAL HISTORY:   1.  As under cardiovascular history. 2. Anemia. 3. Chronic kidney disease (Stage 5) likely secondary to a hypertensive nephrosclerosis. a.  2008 Insertion of AV fistula, left arm.  b.  Secondary hyperparathyroidism. c.  Patient is on hemodialysis 3 days a week.          4.   Bipolar disorder. Psychosis. Anxiety. Depression. 5.   Gout. 6.  S/P partial hysterectomy (S/P 3 vaginal deliveries and 1 miscarriage). 7.  Left hip hemiarthroplasty on 2014 for left transcervical femoral neck displaced fracture, S/P fall        on 2014.    8. Hypercalcemia   9. 2015 screening colonoscopy and EGD with biopsy showed gastritis and mild sigmoid diverticulosis. 10. Hospitalized in 2018 with diarrhea and was diagnosed with colitis.       FAMILY HISTORY:   Mother: , 80, complications from Alzheimers disease. Father: , 68, colon cancer.      SOCIAL HISTORY:   Patient does not smoke. She does not drink alcohol.  since 2015       O:  COMPLETE PHYSICAL EXAM:      BP (!) 104/52   Pulse 86   Resp 20   Ht 5' 3\" (1.6 m)   Wt 120 lb 3.2 oz (54.5 kg)   SpO2 97%   BMI 21.29 kg/m²        General:           Elderly lady in no acute distress, appears anxious is a thin well-developed elderly female               IBCY & Neck:   Atraumatic, normocephalic head. No JVD. No carotid bruits.  Carotid upstrokes noted bilaterally. No thyromegaly. Sclerae not icteric. No xanthelasmas. Mucous membranes moist.              Chest:             Mid sternotomy scar well-healed. Symmetrical and nontender. No deformities. Symmetrical and tender.              Lungs:             Clear to auscultation bilaterally.              Heart:              Normal S1 and S2.  No S3 or S4. 2/6 systolic Murmur heard all over the precordium, best over the right upper sternal border.               Abdomen:        Soft without organomegaly or masses, without organomegaly or masses. Normal bowel sounds. No bruits.

## 2020-03-12 ENCOUNTER — HOSPITAL ENCOUNTER (OUTPATIENT)
Dept: CARDIOLOGY | Age: 80
Discharge: HOME | End: 2020-03-12
Payer: MEDICARE

## 2020-03-12 LAB
LV EF: 78 %
LVEF MODALITY: NORMAL

## 2020-03-12 PROCEDURE — 93306 TTE W/DOPPLER COMPLETE: CPT

## 2020-07-01 ENCOUNTER — TELEPHONE (OUTPATIENT)
Dept: CARDIOLOGY CLINIC | Age: 80
End: 2020-07-01

## 2020-07-01 NOTE — TELEPHONE ENCOUNTER
Jaguar Wang called. She got dizzy yesterday and fainted. Her nurse wanted her to let you know this. No other symptoms with this and feels fine today. She did hurt her pelvis and is going to have that x-rayed. Jaguar Wang feels it was from the heat.

## 2020-07-15 ENCOUNTER — TELEPHONE (OUTPATIENT)
Dept: ADMINISTRATIVE | Age: 80
End: 2020-07-15

## 2020-07-15 NOTE — TELEPHONE ENCOUNTER
Pt's dialysis center called on behalf of pt, she was in this week, she spoke to DR Anita Romano who is there, and explained that she had had a fainting episode last week at home, he is asking that Dr Triana's reach out to her, maybe a monitor could be recommended, contact pt at [phone]

## 2020-07-15 NOTE — TELEPHONE ENCOUNTER
Talked to patient ( 7/15/2020 ) and discussed the circumstances around the fainting episode which appears to be most likely secondary to a combination of vasodilation  orthostatic hypotension and vasovagal reaction. Advised patient about keeping self well hydrated in hot weather and to avoid abrupt position changes from supine/sitting to standing.

## 2020-07-22 ENCOUNTER — APPOINTMENT (OUTPATIENT)
Dept: GENERAL RADIOLOGY | Age: 80
DRG: 312 | End: 2020-07-22
Payer: MEDICARE

## 2020-07-22 ENCOUNTER — HOSPITAL ENCOUNTER (INPATIENT)
Age: 80
LOS: 1 days | Discharge: HOME | DRG: 312 | End: 2020-07-24
Attending: EMERGENCY MEDICINE | Admitting: INTERNAL MEDICINE
Payer: MEDICARE

## 2020-07-22 ENCOUNTER — APPOINTMENT (OUTPATIENT)
Dept: CT IMAGING | Age: 80
DRG: 312 | End: 2020-07-22
Payer: MEDICARE

## 2020-07-22 PROBLEM — R55 SYNCOPE AND COLLAPSE: Status: ACTIVE | Noted: 2020-07-22

## 2020-07-22 LAB
ALBUMIN SERPL-MCNC: 4.2 G/DL (ref 3.5–5.2)
ALP BLD-CCNC: 96 U/L (ref 35–104)
ALT SERPL-CCNC: 8 U/L (ref 0–32)
ANION GAP SERPL CALCULATED.3IONS-SCNC: 12 MMOL/L (ref 7–16)
APTT: 29.5 SEC (ref 24.5–35.1)
AST SERPL-CCNC: 27 U/L (ref 0–31)
BASOPHILS ABSOLUTE: 0.03 E9/L (ref 0–0.2)
BASOPHILS RELATIVE PERCENT: 0.4 % (ref 0–2)
BILIRUB SERPL-MCNC: 0.3 MG/DL (ref 0–1.2)
BUN BLDV-MCNC: 21 MG/DL (ref 8–23)
CALCIUM SERPL-MCNC: 9.3 MG/DL (ref 8.6–10.2)
CHLORIDE BLD-SCNC: 92 MMOL/L (ref 98–107)
CO2: 32 MMOL/L (ref 22–29)
CREAT SERPL-MCNC: 2.4 MG/DL (ref 0.5–1)
EOSINOPHILS ABSOLUTE: 0.06 E9/L (ref 0.05–0.5)
EOSINOPHILS RELATIVE PERCENT: 0.7 % (ref 0–6)
FERRITIN: 1612 NG/ML
GFR AFRICAN AMERICAN: 24
GFR NON-AFRICAN AMERICAN: 20 ML/MIN/1.73
GLUCOSE BLD-MCNC: 98 MG/DL (ref 74–99)
HCT VFR BLD CALC: 24.2 % (ref 34–48)
HCT VFR BLD CALC: 24.6 % (ref 34–48)
HEMOGLOBIN: 7.8 G/DL (ref 11.5–15.5)
IMMATURE GRANULOCYTES #: 0.04 E9/L
IMMATURE GRANULOCYTES %: 0.5 % (ref 0–5)
IMMATURE RETIC FRACT: 22.4 % (ref 3–15.9)
INR BLD: 1
IRON SATURATION: 17 % (ref 15–50)
IRON: 50 MCG/DL (ref 37–145)
LYMPHOCYTES ABSOLUTE: 0.94 E9/L (ref 1.5–4)
LYMPHOCYTES RELATIVE PERCENT: 11.6 % (ref 20–42)
MCH RBC QN AUTO: 32.4 PG (ref 26–35)
MCHC RBC AUTO-ENTMCNC: 31.7 % (ref 32–34.5)
MCV RBC AUTO: 102.1 FL (ref 80–99.9)
MONOCYTES ABSOLUTE: 0.57 E9/L (ref 0.1–0.95)
MONOCYTES RELATIVE PERCENT: 7 % (ref 2–12)
NEUTROPHILS ABSOLUTE: 6.47 E9/L (ref 1.8–7.3)
NEUTROPHILS RELATIVE PERCENT: 79.8 % (ref 43–80)
PDW BLD-RTO: 15 FL (ref 11.5–15)
PLATELET # BLD: 247 E9/L (ref 130–450)
PMV BLD AUTO: 9.8 FL (ref 7–12)
POTASSIUM REFLEX MAGNESIUM: 4.8 MMOL/L (ref 3.5–5)
PROTHROMBIN TIME: 11.7 SEC (ref 9.3–12.4)
RBC # BLD: 2.41 E12/L (ref 3.5–5.5)
RETIC HGB EQUIVALENT: 36.3 PG (ref 28.2–36.6)
RETICULOCYTE ABSOLUTE COUNT: 0.1 E12/L
RETICULOCYTE COUNT PCT: 4.4 % (ref 0.4–1.9)
SODIUM BLD-SCNC: 136 MMOL/L (ref 132–146)
TOTAL IRON BINDING CAPACITY: 290 MCG/DL (ref 250–450)
TOTAL PROTEIN: 6.7 G/DL (ref 6.4–8.3)
TROPONIN: 0.05 NG/ML (ref 0–0.03)
WBC # BLD: 8.1 E9/L (ref 4.5–11.5)

## 2020-07-22 PROCEDURE — 83540 ASSAY OF IRON: CPT

## 2020-07-22 PROCEDURE — 99285 EMERGENCY DEPT VISIT HI MDM: CPT

## 2020-07-22 PROCEDURE — 85610 PROTHROMBIN TIME: CPT

## 2020-07-22 PROCEDURE — 6370000000 HC RX 637 (ALT 250 FOR IP): Performed by: INTERNAL MEDICINE

## 2020-07-22 PROCEDURE — 71045 X-RAY EXAM CHEST 1 VIEW: CPT

## 2020-07-22 PROCEDURE — 36415 COLL VENOUS BLD VENIPUNCTURE: CPT

## 2020-07-22 PROCEDURE — 93005 ELECTROCARDIOGRAM TRACING: CPT | Performed by: EMERGENCY MEDICINE

## 2020-07-22 PROCEDURE — 83550 IRON BINDING TEST: CPT

## 2020-07-22 PROCEDURE — 6360000002 HC RX W HCPCS: Performed by: EMERGENCY MEDICINE

## 2020-07-22 PROCEDURE — 2580000003 HC RX 258: Performed by: RADIOLOGY

## 2020-07-22 PROCEDURE — 72125 CT NECK SPINE W/O DYE: CPT

## 2020-07-22 PROCEDURE — 82728 ASSAY OF FERRITIN: CPT

## 2020-07-22 PROCEDURE — 85730 THROMBOPLASTIN TIME PARTIAL: CPT

## 2020-07-22 PROCEDURE — 80053 COMPREHEN METABOLIC PANEL: CPT

## 2020-07-22 PROCEDURE — G0378 HOSPITAL OBSERVATION PER HR: HCPCS

## 2020-07-22 PROCEDURE — 2500000003 HC RX 250 WO HCPCS: Performed by: EMERGENCY MEDICINE

## 2020-07-22 PROCEDURE — 2580000003 HC RX 258: Performed by: INTERNAL MEDICINE

## 2020-07-22 PROCEDURE — 90715 TDAP VACCINE 7 YRS/> IM: CPT | Performed by: EMERGENCY MEDICINE

## 2020-07-22 PROCEDURE — 84484 ASSAY OF TROPONIN QUANT: CPT

## 2020-07-22 PROCEDURE — 94760 N-INVAS EAR/PLS OXIMETRY 1: CPT

## 2020-07-22 PROCEDURE — 85045 AUTOMATED RETICULOCYTE COUNT: CPT

## 2020-07-22 PROCEDURE — 70450 CT HEAD/BRAIN W/O DYE: CPT

## 2020-07-22 PROCEDURE — 85025 COMPLETE CBC W/AUTO DIFF WBC: CPT

## 2020-07-22 PROCEDURE — 71275 CT ANGIOGRAPHY CHEST: CPT

## 2020-07-22 PROCEDURE — 6360000004 HC RX CONTRAST MEDICATION: Performed by: RADIOLOGY

## 2020-07-22 PROCEDURE — 90471 IMMUNIZATION ADMIN: CPT | Performed by: EMERGENCY MEDICINE

## 2020-07-22 RX ORDER — LIDOCAINE HYDROCHLORIDE AND EPINEPHRINE 10; 10 MG/ML; UG/ML
20 INJECTION, SOLUTION INFILTRATION; PERINEURAL ONCE
Status: COMPLETED | OUTPATIENT
Start: 2020-07-22 | End: 2020-07-22

## 2020-07-22 RX ORDER — VALPROIC ACID 250 MG/1
250 CAPSULE, LIQUID FILLED ORAL EVERY MORNING
Status: DISCONTINUED | OUTPATIENT
Start: 2020-07-23 | End: 2020-07-24 | Stop reason: HOSPADM

## 2020-07-22 RX ORDER — PROMETHAZINE HYDROCHLORIDE 25 MG/1
12.5 TABLET ORAL EVERY 6 HOURS PRN
Status: DISCONTINUED | OUTPATIENT
Start: 2020-07-22 | End: 2020-07-24 | Stop reason: HOSPADM

## 2020-07-22 RX ORDER — ACETAMINOPHEN 325 MG/1
650 TABLET ORAL EVERY 6 HOURS PRN
Status: DISCONTINUED | OUTPATIENT
Start: 2020-07-22 | End: 2020-07-24 | Stop reason: HOSPADM

## 2020-07-22 RX ORDER — ACETAMINOPHEN 650 MG/1
650 SUPPOSITORY RECTAL EVERY 6 HOURS PRN
Status: DISCONTINUED | OUTPATIENT
Start: 2020-07-22 | End: 2020-07-24 | Stop reason: HOSPADM

## 2020-07-22 RX ORDER — SODIUM CHLORIDE 0.9 % (FLUSH) 0.9 %
10 SYRINGE (ML) INJECTION PRN
Status: DISCONTINUED | OUTPATIENT
Start: 2020-07-22 | End: 2020-07-24 | Stop reason: HOSPADM

## 2020-07-22 RX ORDER — IMIPRAMINE HCL 25 MG
50 TABLET ORAL NIGHTLY
Status: DISCONTINUED | OUTPATIENT
Start: 2020-07-22 | End: 2020-07-24 | Stop reason: HOSPADM

## 2020-07-22 RX ORDER — SODIUM CHLORIDE 0.9 % (FLUSH) 0.9 %
10 SYRINGE (ML) INJECTION EVERY 12 HOURS SCHEDULED
Status: DISCONTINUED | OUTPATIENT
Start: 2020-07-22 | End: 2020-07-24 | Stop reason: HOSPADM

## 2020-07-22 RX ORDER — SEVELAMER CARBONATE 800 MG/1
1 TABLET, FILM COATED ORAL 2 TIMES DAILY WITH MEALS
Status: ON HOLD | COMMUNITY
End: 2022-08-27

## 2020-07-22 RX ORDER — PANTOPRAZOLE SODIUM 40 MG/1
40 TABLET, DELAYED RELEASE ORAL DAILY
Status: DISCONTINUED | OUTPATIENT
Start: 2020-07-22 | End: 2020-07-24 | Stop reason: HOSPADM

## 2020-07-22 RX ORDER — ONDANSETRON 2 MG/ML
4 INJECTION INTRAMUSCULAR; INTRAVENOUS EVERY 6 HOURS PRN
Status: DISCONTINUED | OUTPATIENT
Start: 2020-07-22 | End: 2020-07-24 | Stop reason: HOSPADM

## 2020-07-22 RX ORDER — DIAPER,BRIEF,INFANT-TODD,DISP
EACH MISCELLANEOUS ONCE
Status: DISCONTINUED | OUTPATIENT
Start: 2020-07-22 | End: 2020-07-22

## 2020-07-22 RX ORDER — POLYETHYLENE GLYCOL 3350 17 G/17G
17 POWDER, FOR SOLUTION ORAL DAILY PRN
Status: DISCONTINUED | OUTPATIENT
Start: 2020-07-22 | End: 2020-07-24 | Stop reason: HOSPADM

## 2020-07-22 RX ORDER — VALPROIC ACID 250 MG/1
500 CAPSULE, LIQUID FILLED ORAL NIGHTLY
COMMUNITY

## 2020-07-22 RX ORDER — SODIUM CHLORIDE 0.9 % (FLUSH) 0.9 %
10 SYRINGE (ML) INJECTION ONCE
Status: COMPLETED | OUTPATIENT
Start: 2020-07-22 | End: 2020-07-22

## 2020-07-22 RX ORDER — SODIUM CHLORIDE, SODIUM LACTATE, POTASSIUM CHLORIDE, CALCIUM CHLORIDE 600; 310; 30; 20 MG/100ML; MG/100ML; MG/100ML; MG/100ML
INJECTION, SOLUTION INTRAVENOUS CONTINUOUS
Status: DISCONTINUED | OUTPATIENT
Start: 2020-07-22 | End: 2020-07-23

## 2020-07-22 RX ORDER — VALPROIC ACID 250 MG/1
500 CAPSULE, LIQUID FILLED ORAL NIGHTLY
Status: DISCONTINUED | OUTPATIENT
Start: 2020-07-22 | End: 2020-07-24 | Stop reason: HOSPADM

## 2020-07-22 RX ADMIN — Medication 10 ML: at 20:17

## 2020-07-22 RX ADMIN — PANTOPRAZOLE SODIUM 40 MG: 40 TABLET, DELAYED RELEASE ORAL at 20:16

## 2020-07-22 RX ADMIN — VALPROIC ACID 500 MG: 250 CAPSULE, LIQUID FILLED ORAL at 20:16

## 2020-07-22 RX ADMIN — SODIUM CHLORIDE, POTASSIUM CHLORIDE, SODIUM LACTATE AND CALCIUM CHLORIDE: 600; 310; 30; 20 INJECTION, SOLUTION INTRAVENOUS at 20:16

## 2020-07-22 RX ADMIN — Medication 10 ML: at 15:48

## 2020-07-22 RX ADMIN — IMIPRAMINE HYDROCHLORIDE 50 MG: 25 TABLET ORAL at 20:16

## 2020-07-22 RX ADMIN — TETANUS TOXOID, REDUCED DIPHTHERIA TOXOID AND ACELLULAR PERTUSSIS VACCINE, ADSORBED 0.5 ML: 5; 2.5; 8; 8; 2.5 SUSPENSION INTRAMUSCULAR at 15:03

## 2020-07-22 RX ADMIN — LIDOCAINE HYDROCHLORIDE,EPINEPHRINE BITARTRATE 20 ML: 10; .01 INJECTION, SOLUTION INFILTRATION; PERINEURAL at 15:03

## 2020-07-22 RX ADMIN — IOPAMIDOL 70 ML: 755 INJECTION, SOLUTION INTRAVENOUS at 15:48

## 2020-07-22 ASSESSMENT — ENCOUNTER SYMPTOMS
PHOTOPHOBIA: 0
EYE PAIN: 0
ABDOMINAL DISTENTION: 0
SHORTNESS OF BREATH: 0
NAUSEA: 0
RHINORRHEA: 0
EYE REDNESS: 0
FACIAL SWELLING: 0
COUGH: 0
COLOR CHANGE: 0
CHEST TIGHTNESS: 0
DIARRHEA: 0
CONSTIPATION: 0

## 2020-07-22 ASSESSMENT — PAIN SCALES - GENERAL
PAINLEVEL_OUTOF10: 6
PAINLEVEL_OUTOF10: 5
PAINLEVEL_OUTOF10: 0

## 2020-07-22 NOTE — ED NOTES
[facility]   Department of Emergency Medicine ED  Provider Note  ED Room:     Radiology Procedure Waiver   Name: Dereje Staples  : 1940  MRN: [id number]   Date:  20    Time: 2:30 PM EDT    Benefits of immediately proceeding with Radiology exam(s) without pre-testing outweigh the risks or are not indicated as specified below and therefore the following is/are being waived:    [] Pregnancy test:   [] Patients LMP on-time and regular.   [] Patient had Tubal Ligation or has other Contraception Device. [] Patient  is Menopausal or Premenarcheal.    [] Patient had Full or Partial Hysterectomy. [] Protocol for Iodine allergy    [] MRI Questionnaire    [x] BUN/Creatinine   [] Patient age w/no hx of renal dysfunction. [x] Patient on Dialysis. [] Recent Normal Labs.     Electronically signed by Emily Pugh MD    20    2:30 PM EDT             Jacob Shaver MD  [phone]

## 2020-07-22 NOTE — ED PROVIDER NOTES
Depression, Gout, Hemodialysis patient ([address] Hyperlipidemia, Hypertension, Left displaced femoral neck fracture ([address] Murmur, heart, Psychosis ([address] Secondary hyperparathyroidism ([address] and Shingles. Past Surgical History:  has a past surgical history that includes AV fistula repair (4-30-08); Dialysis fistula creation (1-8-08); Hysterectomy; Diagnostic Cardiac Cath Lab Procedure (12/21/2010); transthoracic echocardiogram (6/19/2012); Colonoscopy (3/15); Aortic valve replacement (8/5/14); fracture surgery (9/8/14); and Upper gastrointestinal endoscopy (3/15). Social History:  reports that she has never smoked. She has never used smokeless tobacco. She reports previous alcohol use. She reports that she does not use drugs. Family History: family history is not on file. Unless otherwise noted, family history is non contributory    The patients home medications have been reviewed. Allergies: Other    EKG Interpretation  Interpreted by emergency department physician, Dr. Dye Deforest     7/22/20  Time: 4239    Rhythm: normal sinus   Rate: normal  Axis: normal  Conduction: normal  ST Segments: no acute change  T Waves: no acute change    Clinical Impression: Sinus rhythm, no acute ischemic changes    Comparison to Old EKG  Stable from prior          Physical Exam:  Constitutional/General: Alert and oriented x3  Head: Normocephalic and right eyebrow laceration,, no contamination or foreign bodies. Eyes: PERRL, EOMI, sclera non icteric  Mouth: Oropharynx clear, handling secretions  Neck: Supple, full ROM, no stridor, no meningeal signs  Respiratory: Lungs clear to auscultation bilaterally, no wheezes, rales, or rhonchi. Not in respiratory distress  Cardiovascular:  Regular rate. Regular rhythm. 3/6 SHARAN, no gallops, or rubs. 2+ distal pulses. Equal extremity pulses. GI:  Abdomen Soft, Non tender, Non distended. No rebound, guarding, or rigidity. No pulsatile masses. Musculoskeletal: Moves all extremities x 4. Warm and well perfused,   No edema. Palpable peripheral pulses  Integument: skin warm and dry. No rashes. Neurologic: GCS 15, no focal deficits  Psychiatric: Normal Affect      I directly supervised any procedures performed by the resident and was present for the procedure including all critical portions of the procedure      I, Dr. Tremaine Bob, am the primary provider of record    My Medical Decision Making:          Syncope  No chest pain  No shortness of breath  No back pain  Normal neuro exam  No motor weakness  Had full dialysis today  Aortic murmur  No dissection on CTA  Needs admission to r/o cardiac or valvular cause of syncope. No arrhythmia on EKG or monitor        1. Syncope and collapse    2. Closed head injury, initial encounter    3.  Facial laceration, initial encounter            Kian Thomas MD  07/22/20 [id number]

## 2020-07-22 NOTE — ED PROVIDER NOTES
Patient is a 79-year-old female that presents to the hospital after falling at home. Patient states she got dialysis today and afterwards to home showed to the bathroom and then she does not member she went to the bathroom or not but she woke up on the ground in the bathroom. She had no lightheadedness or dizziness before the event and does not member anything before the event happened. States this has not happened before she does have an abrasion to the area above the right eye that will be in need of suturing. Patient does take aspirin as a anticoagulation no other anticoagulation admitted to. Patient at this time is back to baseline alert and oriented. The history is provided by the patient. No  was used. Symptoms are worsened by nothing       Symptoms are improved by nothing    Denies any associated chest pain, shortness of breath, nausea, vomiting, diarrhea    Review of Systems   Constitutional: Negative for activity change, chills, diaphoresis and fatigue. HENT: Negative for congestion, facial swelling, hearing loss and rhinorrhea. Eyes: Negative for photophobia, pain and redness. Respiratory: Negative for cough, chest tightness and shortness of breath. Cardiovascular: Negative for chest pain, palpitations and leg swelling. Gastrointestinal: Negative for abdominal distention, constipation, diarrhea and nausea. Genitourinary: Negative for difficulty urinating, dysuria, frequency and hematuria. Musculoskeletal: Negative for arthralgias, joint swelling and myalgias. Skin: Positive for wound (Wound above the right). Negative for color change, pallor and rash. Neurological: Positive for syncope. Negative for light-headedness, numbness and headaches. Hematological: Negative for adenopathy. Physical Exam  Vitals signs and nursing note reviewed. Constitutional:       Appearance: Normal appearance. She is well-developed. She is not ill-appearing. HENT:      Head: Normocephalic. Comments: Patient has a hematoma and an abrasion over the right eye. Right Ear: Tympanic membrane normal.      Left Ear: Tympanic membrane normal.      Nose: Nose normal. No congestion. Mouth/Throat:      Mouth: Mucous membranes are moist.      Pharynx: Oropharynx is clear. Eyes:      Pupils: Pupils are equal, round, and reactive to light. Neck:      Musculoskeletal: Normal range of motion and neck supple. Cardiovascular:      Rate and Rhythm: Normal rate and regular rhythm. Pulmonary:      Effort: Pulmonary effort is normal. No respiratory distress. Breath sounds: Normal breath sounds. No wheezing or rales. Abdominal:      General: Bowel sounds are normal.      Palpations: Abdomen is soft. Tenderness: There is no abdominal tenderness. There is no guarding or rebound. Musculoskeletal:         General: No swelling or tenderness. Skin:     General: Skin is warm and dry. Neurological:      Mental Status: She is alert and oriented to person, place, and time. Cranial Nerves: No cranial nerve deficit. Coordination: Coordination normal.          Lac Repair    Date/Time: 7/23/2020 10:34 AM  Performed by: Parker Reagan DO  Authorized by: Randi Michel MD     Consent:     Consent obtained:  Verbal    Consent given by:  Patient    Risks discussed:  Infection, poor cosmetic result, nerve damage and need for additional repair    Alternatives discussed:  No treatment and delayed treatment  Anesthesia (see MAR for exact dosages): Anesthesia method:  Local infiltration    Local anesthetic:  Lidocaine 1% WITH epi  Laceration details:     Location:  Face    Face location:  R eyebrow    Length (cm):  2.6  Repair type:     Repair type:   Intermediate  Pre-procedure details:     Preparation:  Patient was prepped and draped in usual sterile fashion  Exploration:     Hemostasis achieved with:  Direct pressure    Wound exploration: wound explored through full range of motion and entire depth of wound probed and visualized      Wound extent: no muscle damage noted, no tendon damage noted and no underlying fracture noted      Contaminated: no    Treatment:     Area cleansed with:  Shur-Clens and saline    Amount of cleaning:  Standard    Irrigation solution:  Sterile water    Visualized foreign bodies/material removed: no    Skin repair:     Repair method:  Sutures    Suture size:  5-0    Suture material:  Prolene    Suture technique:  Simple interrupted    Number of sutures:  4  Approximation:     Approximation:  Close  Post-procedure details:     Dressing:  Antibiotic ointment and non-adherent dressing    Patient tolerance of procedure: Tolerated well, no immediate complications             EKG:  This EKG is signed and interpreted by me. Rate: 74  Rhythm: Sinus  Interpretation: no acute changes  Comparison: stable as compared to patient's most recent EKG       MDM  Number of Diagnoses or Management Options  Closed head injury, initial encounter:   Facial laceration, initial encounter:   Syncope and collapse:   Diagnosis management comments: Patient presented to the hospital after a fall that occurred at home. Due to her having no presyncopal symptoms no lightheadedness and occurring after dialysis without any electrolyte abnormalities the patient was admitted to the hospital after initial evaluation in emergency department.   This admission was for observation in order to determine and telemetry to determine if there is any cardiac or other cause of her acute syncope.          --------------------------------------------- PAST HISTORY ---------------------------------------------  Past Medical History:  has a past medical history of Anemia, Aortic stenosis, severe, Arthritis, Bipolar disorder ([facility] CAD (coronary artery disease), Carotid bruit, Chronic kidney disease, CRF (chronic renal failure), Depression, Gout, [facility] Hyperlipidemia, Hypertension, Left displaced femoral neck fracture ([facility] [address] [address] Murmur, heart, Psychosis ([facility] [address] Secondary hyperparathyroidism ([facility] [address] and Shingles. Past Surgical History:  has a past surgical history that includes AV fistula repair (4-30-08); Dialysis fistula creation (1-8-08); Hysterectomy; Diagnostic Cardiac Cath Lab Procedure (12/21/2010); transthoracic echocardiogram (6/19/2012); Colonoscopy (3/15); Aortic valve replacement (8/5/14); fracture surgery (9/8/14); and Upper gastrointestinal endoscopy (3/15). Social History:  reports that she has never smoked. She has never used smokeless tobacco. She reports previous alcohol use. She reports that she does not use drugs. Family History: family history is not on file. The patients home medications have been reviewed. Allergies:  Other    -------------------------------------------------- RESULTS -------------------------------------------------    LABS:  Results for orders placed or performed during the hospital encounter of 07/22/20   CBC Auto Differential   Result Value Ref Range    WBC 8.1 4.5 - 11.5 E9/L    RBC 2.41 (L) 3.50 - 5.50 E12/L    Hemoglobin 7.8 (L) 11.5 - 15.5 g/dL    Hematocrit 24.6 (L) 34.0 - 48.0 %    .1 (H) 80.0 - 99.9 fL    MCH 32.4 26.0 - 35.0 pg    MCHC 31.7 (L) 32.0 - 34.5 %    RDW 15.0 11.5 - 15.0 fL    Platelets 935 996 - 110 E9/L    MPV 9.8 7.0 - 12.0 fL    Neutrophils % 79.8 43.0 - 80.0 %    Immature Granulocytes % 0.5 0.0 - 5.0 %    Lymphocytes % 11.6 (L) 20.0 - 42.0 %    Monocytes % 7.0 2.0 - 12.0 %    Eosinophils % 0.7 0.0 - 6.0 %    Basophils % 0.4 0.0 - 2.0 %    Neutrophils Absolute 6.47 1.80 - 7.30 E9/L    Immature Granulocytes # 0.04 E9/L    Lymphocytes Absolute 0.94 (L) 1.50 - 4.00 E9/L    Monocytes Absolute 0.57 0.10 - 0.95 E9/L    Eosinophils Absolute 0.06 0.05 - 0.50 E9/L    Basophils Absolute 0.03 0.00 - 0.20 E9/L   Comprehensive Metabolic Panel w/ Reflex to MG   Result Value Ref Range    Sodium 136 132 - 146 mmol/L    Potassium reflex Magnesium 4.8 3.5 - 5.0 mmol/L    Chloride 92 (L) 98 - 107 mmol/L    CO2 32 (H) 22 - 29 mmol/L    Anion Gap 12 7 - 16 mmol/L    Glucose 98 74 - 99 mg/dL    BUN 21 8 - 23 mg/dL    CREATININE 2.4 (H) 0.5 - 1.0 mg/dL    GFR Non-African American 20 >=60 mL/min/1.73    GFR African American 24     Calcium 9.3 8.6 - 10.2 mg/dL    Total Protein 6.7 6.4 - 8.3 g/dL    Alb 4.2 3.5 - 5.2 g/dL    Total Bilirubin 0.3 0.0 - 1.2 mg/dL    Alkaline Phosphatase 96 35 - 104 U/L    ALT 8 0 - 32 U/L    AST 27 0 - 31 U/L   Troponin   Result Value Ref Range    Troponin 0.05 (H) 0.00 - 0.03 ng/mL   APTT   Result Value Ref Range    aPTT 29.5 24.5 - 35.1 sec   Protime-INR   Result Value Ref Range    Protime 11.7 9.3 - 12.4 sec    INR 1.0    Ferritin   Result Value Ref Range    Ferritin 1,612 ng/mL   Iron and TIBC   Result Value Ref Range    Iron 50 37 - 145 mcg/dL    TIBC 290 250 - 450 mcg/dL    Iron Saturation 17 15 - 50 %   RETICULOCYTES   Result Value Ref Range    Retic Ct Pct 4.4 (H) 0.4 - 1.9 %    Retic Ct Abs 0.100 E12/L    Immature Retic Fract 22.4 (H) 3.0 - 15.9 %    Hematocrit 24.2 (L) 34.0 - 48.0 %    Retic HGB Equivalent 36.3 28.2 - 36.6 pg   Basic Metabolic Panel w/ Reflex to MG   Result Value Ref Range    Sodium 138 132 - 146 mmol/L    Potassium reflex Magnesium 5.0 3.5 - 5.0 mmol/L    Chloride 97 (L) 98 - 107 mmol/L    CO2 27 22 - 29 mmol/L    Anion Gap 14 7 - 16 mmol/L    Glucose 87 74 - 99 mg/dL    BUN 40 (H) 8 - 23 mg/dL    CREATININE 4.3 (H) 0.5 - 1.0 mg/dL    GFR Non-African American 10 >=60 mL/min/1.73    GFR African American 12     Calcium 8.7 8.6 - 10.2 mg/dL   CBC auto differential   Result Value Ref Range    WBC 6.2 4.5 - 11.5 E9/L    RBC 2.33 (L) 3.50 - 5.50 E12/L    Hemoglobin 7.6 (L) 11.5 - 15.5 g/dL    Hematocrit 23.7 (L) 34.0 - 48.0 %    .7 (H) 80.0 - 99.9 fL    MCH 32.6 26.0 - 35.0 pg    MCHC 32.1 32.0 - 34.5 %    RDW 15.2 (H) 11.5 - 15.0 fL    Platelets 348 225 - 470 E9/L    MPV 10.3 7.0 - 12.0 fL    Neutrophils % 67.4 43.0 - 80.0 %    Immature Granulocytes % 0.3 0.0 - 5.0 %    Lymphocytes % 21.3 20.0 - 42.0 %    Monocytes % 9.2 2.0 - 12.0 %    Eosinophils % 1.5 0.0 - 6.0 %    Basophils % 0.3 0.0 - 2.0 %    Neutrophils Absolute 4.18 1.80 - 7.30 E9/L    Immature Granulocytes # 0.02 E9/L    Lymphocytes Absolute 1.32 (L) 1.50 - 4.00 E9/L    Monocytes Absolute 0.57 0.10 - 0.95 E9/L    Eosinophils Absolute 0.09 0.05 - 0.50 E9/L    Basophils Absolute 0.02 0.00 - 0.20 E9/L   EKG 12 Lead   Result Value Ref Range    Ventricular Rate 74 BPM    Atrial Rate 74 BPM    P-R Interval 112 ms    QRS Duration 82 ms    Q-T Interval 424 ms    QTc Calculation (Bazett) 470 ms    P Axis 34 degrees    R Axis 37 degrees    T Axis 73 degrees       RADIOLOGY:  CT HEAD WO CONTRAST   Final Result   No skull fracture or acute intracranial abnormality. CT CERVICAL SPINE WO CONTRAST   Final Result   1. No fracture or joint dislocation. 2. Mild degenerative changes, as described. CTA CHEST W CONTRAST   Final Result      1. No acute cardiopulmonary abnormality. 2. Stable ectasia of the ascending aorta to 3.7 cm.   3. No pulmonary embolism. 4. Clear lungs. 5. 1-2 mm lingular nodule of doubtful clinical significance. If the   patient is at a high risk for malignancy, per the recommendations of   the Fleischner Society, follow-up CT of the chest should be considered   in 12 months. Otherwise, no future follow-up is needed. XR CHEST PORTABLE   Final Result   No active process. Stable postoperative changes. ------------------------- NURSING NOTES AND VITALS REVIEWED ---------------------------  Date / Time Roomed:  7/22/2020  2:13 PM  ED Bed Assignment:  [id number]    The nursing notes within the ED encounter and vital signs as below have been reviewed.      Patient Vitals for the past 24 hrs:   BP Temp Temp src Pulse Resp SpO2 Height Weight   07/23/20 0809 118/64 98 °F (36.7 °C)

## 2020-07-22 NOTE — ED NOTES
Son and daughter in law updated on care, pt on phone with family     Curt Ag [facility]  07/22/20 [id number]

## 2020-07-22 NOTE — PROGRESS NOTES
Patient's jewelry removed and placed in an envelope. Envelope given to patient. Patient left CT department with jewelry.

## 2020-07-23 LAB
ANION GAP SERPL CALCULATED.3IONS-SCNC: 14 MMOL/L (ref 7–16)
BASOPHILS ABSOLUTE: 0.02 E9/L (ref 0–0.2)
BASOPHILS RELATIVE PERCENT: 0.3 % (ref 0–2)
BUN BLDV-MCNC: 40 MG/DL (ref 8–23)
CALCIUM SERPL-MCNC: 8.7 MG/DL (ref 8.6–10.2)
CHLORIDE BLD-SCNC: 97 MMOL/L (ref 98–107)
CK MB: 2.3 NG/ML (ref 0–4.3)
CO2: 27 MMOL/L (ref 22–29)
CREAT SERPL-MCNC: 4.3 MG/DL (ref 0.5–1)
EKG ATRIAL RATE: 74 BPM
EKG P AXIS: 34 DEGREES
EKG P-R INTERVAL: 112 MS
EKG Q-T INTERVAL: 424 MS
EKG QRS DURATION: 82 MS
EKG QTC CALCULATION (BAZETT): 470 MS
EKG R AXIS: 37 DEGREES
EKG T AXIS: 73 DEGREES
EKG VENTRICULAR RATE: 74 BPM
EOSINOPHILS ABSOLUTE: 0.09 E9/L (ref 0.05–0.5)
EOSINOPHILS RELATIVE PERCENT: 1.5 % (ref 0–6)
GFR AFRICAN AMERICAN: 12
GFR NON-AFRICAN AMERICAN: 10 ML/MIN/1.73
GLUCOSE BLD-MCNC: 87 MG/DL (ref 74–99)
HCT VFR BLD CALC: 23.7 % (ref 34–48)
HEMOGLOBIN: 7.6 G/DL (ref 11.5–15.5)
IMMATURE GRANULOCYTES #: 0.02 E9/L
IMMATURE GRANULOCYTES %: 0.3 % (ref 0–5)
LYMPHOCYTES ABSOLUTE: 1.32 E9/L (ref 1.5–4)
LYMPHOCYTES RELATIVE PERCENT: 21.3 % (ref 20–42)
MCH RBC QN AUTO: 32.6 PG (ref 26–35)
MCHC RBC AUTO-ENTMCNC: 32.1 % (ref 32–34.5)
MCV RBC AUTO: 101.7 FL (ref 80–99.9)
MONOCYTES ABSOLUTE: 0.57 E9/L (ref 0.1–0.95)
MONOCYTES RELATIVE PERCENT: 9.2 % (ref 2–12)
NEUTROPHILS ABSOLUTE: 4.18 E9/L (ref 1.8–7.3)
NEUTROPHILS RELATIVE PERCENT: 67.4 % (ref 43–80)
PDW BLD-RTO: 15.2 FL (ref 11.5–15)
PLATELET # BLD: 249 E9/L (ref 130–450)
PMV BLD AUTO: 10.3 FL (ref 7–12)
POTASSIUM REFLEX MAGNESIUM: 5 MMOL/L (ref 3.5–5)
RBC # BLD: 2.33 E12/L (ref 3.5–5.5)
SODIUM BLD-SCNC: 138 MMOL/L (ref 132–146)
TOTAL CK: 70 U/L (ref 20–180)
WBC # BLD: 6.2 E9/L (ref 4.5–11.5)

## 2020-07-23 PROCEDURE — G0378 HOSPITAL OBSERVATION PER HR: HCPCS

## 2020-07-23 PROCEDURE — 0HQ1XZZ REPAIR FACE SKIN, EXTERNAL APPROACH: ICD-10-PCS | Performed by: EMERGENCY MEDICINE

## 2020-07-23 PROCEDURE — 93010 ELECTROCARDIOGRAM REPORT: CPT | Performed by: INTERNAL MEDICINE

## 2020-07-23 PROCEDURE — 97530 THERAPEUTIC ACTIVITIES: CPT

## 2020-07-23 PROCEDURE — 6370000000 HC RX 637 (ALT 250 FOR IP): Performed by: INTERNAL MEDICINE

## 2020-07-23 PROCEDURE — 85025 COMPLETE CBC W/AUTO DIFF WBC: CPT

## 2020-07-23 PROCEDURE — 97165 OT EVAL LOW COMPLEX 30 MIN: CPT

## 2020-07-23 PROCEDURE — 2580000003 HC RX 258: Performed by: INTERNAL MEDICINE

## 2020-07-23 PROCEDURE — 36415 COLL VENOUS BLD VENIPUNCTURE: CPT

## 2020-07-23 PROCEDURE — 82550 ASSAY OF CK (CPK): CPT

## 2020-07-23 PROCEDURE — 82553 CREATINE MB FRACTION: CPT

## 2020-07-23 PROCEDURE — 80048 BASIC METABOLIC PNL TOTAL CA: CPT

## 2020-07-23 PROCEDURE — 99215 OFFICE O/P EST HI 40 MIN: CPT | Performed by: INTERNAL MEDICINE

## 2020-07-23 PROCEDURE — APPSS60 APP SPLIT SHARED TIME 46-60 MINUTES: Performed by: CLINICAL NURSE SPECIALIST

## 2020-07-23 RX ADMIN — Medication 10 ML: at 20:04

## 2020-07-23 RX ADMIN — PANTOPRAZOLE SODIUM 40 MG: 40 TABLET, DELAYED RELEASE ORAL at 10:09

## 2020-07-23 RX ADMIN — VALPROIC ACID 500 MG: 250 CAPSULE, LIQUID FILLED ORAL at 20:03

## 2020-07-23 RX ADMIN — VALPROIC ACID 250 MG: 250 CAPSULE, LIQUID FILLED ORAL at 10:09

## 2020-07-23 RX ADMIN — IMIPRAMINE HYDROCHLORIDE 50 MG: 25 TABLET ORAL at 20:03

## 2020-07-23 RX ADMIN — ACETAMINOPHEN 650 MG: 325 TABLET ORAL at 20:03

## 2020-07-23 RX ADMIN — ACETAMINOPHEN 650 MG: 325 TABLET ORAL at 02:32

## 2020-07-23 RX ADMIN — SODIUM CHLORIDE, POTASSIUM CHLORIDE, SODIUM LACTATE AND CALCIUM CHLORIDE: 600; 310; 30; 20 INJECTION, SOLUTION INTRAVENOUS at 11:39

## 2020-07-23 ASSESSMENT — PAIN SCALES - GENERAL
PAINLEVEL_OUTOF10: 0
PAINLEVEL_OUTOF10: 8
PAINLEVEL_OUTOF10: 8

## 2020-07-23 ASSESSMENT — PAIN DESCRIPTION - PROGRESSION: CLINICAL_PROGRESSION: GRADUALLY WORSENING

## 2020-07-23 ASSESSMENT — PAIN DESCRIPTION - DESCRIPTORS: DESCRIPTORS: ACHING;CONSTANT;HEADACHE

## 2020-07-23 ASSESSMENT — PAIN DESCRIPTION - PAIN TYPE: TYPE: ACUTE PAIN

## 2020-07-23 ASSESSMENT — PAIN DESCRIPTION - LOCATION: LOCATION: HEAD

## 2020-07-23 ASSESSMENT — PAIN DESCRIPTION - ORIENTATION: ORIENTATION: RIGHT;MID

## 2020-07-23 NOTE — CONSULTS
Inpatient Cardiology Consultation      Reason for Consult:  Syncope    Consulting Physician: Dr. Lj Garcia    Requesting Physician:  Dr. Josy Salter     Date of Consultation: 7/23/2020    History of Present Illness:    Mrs. Mode Polanco is a 68year old lady with aortic valve replacement in 2014, hypertension, and chronic kidney disease. She is followed at our practice by Dr. Lorraine Ibarra, and was last seen in outpatient follow up in January 2020. She had her first syncopal episode about three weeks ago. At that time, she had been standing outside talking, and felt as thought the \"heat was getting to\" her, as she felt both dizzy and lightheaded. She first sat down, and then went into the bathroom, and after urinating lost consciousness while walking to her bedroom. She remembers having some blurred vision, and thinks she felt overheated. She later discussed it with her nephrologist at dialysis, and he advised her to notify to her cardiologist. She spoke to Dr. Lorraine Ibarra by telephone, and it  was felt the syncope was most likely be a combination of  vasodilation,  orthostatic hypotension, and vasovagal reaction. She was advised to stay well hydrated and avoid abrupt position change. She later discussed this with her nurse from [facility], and was told not to increase her oral intake due to her kidney disease. Monday when she was at dialysis, she was told that she was \"losing blood\", although she is unsure what her H&H was there. Yesterday after returning home from dialysis, she urinated and strained to have a bowel movement, and then remembers walking out of her bathroom. She woke on the floor, and had been incontinent of both stool and urine. She is unsure of how long she laid on the floor, but eventually she was able to crawl to the phone to call 911. On arrival to the ER, H&H was 7.8 & 24.6, with troponin of 0.05. EKG showed SR with no acute ST-T wave changes. She was started on Lactated Ringers at 75 mls/hour.  Orthostatic vital signs obtained this morning were negative (supine 139/63, pulse 66; sitting 143/66, pulse 71, and standing 143/66, pulse 80) after receiving more than one liter of hydration. Currently, she is resting in bed and appears ill but in no acute distress. She denies positional dizziness and has had some dark stools but no gross bleeding. She has had no arrhythmias since admission, and denies palpitations or exertional dyspnea. She does report chronic right side chest pain which has been present since her cardiac surgery. She has had no recent medication changes, and reports that she drinks about four cups of water and one half cup of cranberry juice daily. CARDIOVASCULAR HISTORY:   1. Hypertension. 2. Hyperlipidemia. 3. Coronary artery disease:  a. 12/21/2010 Cardiac catheterization: No significant epicardial coronary artery disease. Systemic hypertension. Concentric left ventricular hypertrophy with hyperdynamic left ventricular systolic function. Elevated left ventricular end diastolic pressure. Trace aortic regurgitation. b.  7/11/2014 Right and left heart catheterization:  Severe calcific aortic stenosis.  Systemic hypertension.  Patent coronary arteries.  Patent carotid arteries.  Normal right sided pressures. c.  [place] nuclear stress test done on 8/7/2019 showed no evidence of fixed or reversible perfusion abnormality with normal wall motion and a calculated ejection fraction of 77%. 4. Severe aortic stenosis.             a.  6/25/2014 Echocardiogram showed normal left ventricular size, wall motion and systolic                      function with Stage 1 left ventricular diastolic dysfunction. Normal right ventricular size and               function.  Moderate aortic valve sclerosis with severe aortic stenosis and mild aortic regurgitation.              b.  Aortic valve mean gradient of 47 mmHg with an aortic valve area of 0.65 sq cm on cardiac               catheterization on 7/11/2014.               D.  Aortic valve replacement with a 21 mm Medtronic Mosaic bioprosthesis tissue valve, 7/30/2014.                d.  Echocardiogram done on 03/17/2016 showed normal left ventricular size with borderline left ventricular hypertrophy with an ejection fraction of 75% with stage 1 left ventricular diastolic dysfunction. Normal right ventricular        size and function.  Mildly dilated left atrium. Mild mitral annular calcification with mild to moderate mitral regurgitation. Bioprosthesis in the aortic position, which is well seated, but the individual aortic leaflets were not well visualized with a calculated mean gradient across the aortic prosthesis of 25.25 mmHg with no aortic regurgitation. Sclerotic and calcified aortic root.           e. Echocardiogram done on 3/12/2020 showed normal left ventricular size with borderline concentric LVH. No regional wall motion abnormalities. EF visually estimated at 75 to 80%. Doppler evidence of stage II diastolic dysfunction. Normal RV size and function. Left atrium mildly dilated. Focal calcification of mitral valve leaflets. Moderate mitral annular calcification. No mitral stenosis. Mild to moderate MR. Bioprosthetic aortic valve which was well seated with mean gradient of 28.3 mm Hg and no aortic regurgitation. Mild to moderate tricuspid regurgitation. 5. Unroofing of anomalous right coronary artery at the time of the aortic valve surgery, 7/30/2014.    6. Paroxysmal atrial fibrillation.        a.  Modified Maze procedure and bipolar radiofrequency ablation, 7/30/2014.    7. Carotid ultrasound done on 5/7/2014 was read by Dr. Randolph Gomez (Vascular surgery) as showing 30% stenosis on the right and no stenosis on the left. 8. Paroxysmal atrial fibrillation. 9. 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    PAST MEDICAL HISTORY:   1. As under cardiovascular history. 2. Anemia. 3. Chronic kidney disease (Stage 5) likely secondary to a hypertensive nephrosclerosis. a.  2008 Insertion of AV fistula, left arm.  b.  Secondary hyperparathyroidism. c.  Patient is on hemodialysis 3 days a week.          4.   Bipolar disorder. Psychosis. Anxiety. Depression. 5.   Gout. 6.  S/P partial hysterectomy (S/P 3 vaginal deliveries and 1 miscarriage). 7.  Left hip hemiarthroplasty on 2014 for left transcervical femoral neck displaced fracture, S/P fall        on 2014.    8. Hypercalcemia   9. 2015 screening colonoscopy and EGD with biopsy showed gastritis and mild sigmoid diverticulosis. 10. Hospitalized in 2018 with diarrhea and was diagnosed with colitis.        FAMILY HISTORY:   Mother: , 80, complications from Alzheimers disease. Father: , 68, colon cancer.      SOCIAL HISTORY:   Patient does not smoke. She does not drink alcohol.  since 2015    Medications Prior to Admit:  Prior to Admission medications    Medication Sig Start Date End Date Taking? Authorizing Provider   sevelamer (RENVELA) 800 MG tablet Take 2 tablets by mouth 3 times daily (with meals)   Yes Historical Provider, MD   valproic acid (DEPAKENE) 250 MG capsule Take 500 mg by mouth nightly   Yes Historical Provider, MD   eszopiclone (LUNESTA) 2 MG TABS Take 2 mg by mouth nightly. .   Yes Historical Provider, MD   aspirin 81 MG tablet Take 81 mg by mouth daily   Yes Historical Provider, MD   pantoprazole (PROTONIX) 40 MG tablet Take 1 tablet by mouth daily. 3/13/15  Yes GIANNA [place]   amLODIPine (NORVASC) 5 MG tablet Take 5 mg by mouth daily    Yes Historical Provider, MD   imipramine (TOFRANIL) 50 MG tablet Take 50 mg by mouth nightly.    Yes Historical Provider, MD   valproic acid (DEPAKENE) 250 MG capsule Take 250 mg by mouth every morning    Yes Historical Provider, MD   nitroGLYCERIN fracture and surgical repair. · Integumentary: Denies rash, hives or pruritis   · Neurological: Dizziness and syncope as above. Denies numbness or tingling. · Psychiatric: Denies anxiety or depression. · Endocrine: Denies temperature intolerance, excessive thirst, or weight changes. · Hematologic/Lymphatic: Denies abnormal bruising or bleeding. Physical Exam:   /64   Pulse 74   Temp 98 °F (36.7 °C) (Temporal)   Resp 16   Ht 5' 3\" (1.6 m)   Wt 120 lb 9.6 oz (54.7 kg)   SpO2 98%   BMI 21.36 kg/m²   CONST:  Frail elderly lady who appears of stated age. Awake, alert and cooperative. No apparent distress. HEENT:   Head- Normocephalic, atraumatic   Throat- Oral mucosa pink and moist  Neck-  No stridor or jugular venous distention. Radiation of aortic murmur to the carotids. CHEST:  Well healed sternotomy incision. Tenderness to both sides of incision, right more than left. Respirations unlabored. RESPIRATORY: Breath sounds clear throughout   CARDIOVASCULAR:     Heart Ausculation- Regular rate and rhythm, grade 3/6 SM heard across precordium and radiating to the carotids. PV: No lower extremity edema. No varicosities. Pedal pulses palpable  ABDOMEN: Soft, non-tender to light palpation. Bowel sounds present. No palpable masses   MS: Good muscle strength and tone. No atrophy or abnormal movements. : Deferred  SKIN: Warm and dry   NEURO / PSYCH: Oriented to person, place and time. Speech clear and appropriate. Follows all commands.  Pleasant affect     Telemetry: SR, 70s to 80s  ECG 7/22/2020: SR 74 bpm, no acute ST-T wave changes       Intake/Output Summary (Last 24 hours) at 7/23/2020 1052  Last data filed at 7/23/2020 0641  Gross per 24 hour   Intake 1010.12 ml   Output --   Net 1010.12 ml       Labs:   CBC:   Recent Labs     07/22/20  1456 07/23/20  0633   WBC 8.1 6.2   HGB 7.8* 7.6*   HCT 24.2*  24.6* 23.7*    249     BMP:   Recent Labs     07/22/20  1456 07/23/20  0633   NA and laboratory studies were reviewed. The patient is a 79-year-old white female known to [facility] with primary cardiovascular care provided by [facility]. She has a known history of aortic valve disease and underwent bioprosthetic (21 mm Mosaic bioprosthesis) in April, 2014 in the absence of coronary atherosclerosis as well as a history of paroxysmal atrial fibrillation without recurrence following a modified Maze procedure at the time of her surgical intervention, hypertension, hyperlipidemia and stage V chronic kidney disease on maintenance hemodialysis. She is undergone most recent objective assessment with an echocardiogram in March, 2020 demonstrating evidence of a normal-sized left ventricular chamber with borderline concentric left ventricular hypertrophy and somewhat hyperdynamic left ventricular systolic function with stage II diastolic dysfunction with focal mitral leaflet calcification and annular calcification with mild to moderate mitral regurgitation and appropriate function of her aortic bioprosthesis with slightly increased gradients (mean transaortic gradient 28 mmHg, dimensionless index 0.27). She was clinically stable from a cardiovascular standpoint at the time of most recent assessment and remained in her usual state of health until approximately 3 weeks earlier when following environmental exposure she went to the bathroom and following leaving the bathroom noted the onset the onset of apparent lightheadedness without additional cardiovascular symptomatology with subsequent additional premonitory symptoms of visual difficulties followed by an apparent loss of consciousness. At that time she did not seek medical attention with later contact of her primary cardiologist who felt that multiple origins were possible and recommended improved hydration.   She apparently subsequently was notified by her primary care physician of the development of a progressive anemia without additional evaluation to date. On the day of her hospitalization, following dialysis, she again used the bathroom and following completing her tasks and leaving the bathroom again noted a syncopal episode. On this occasion, she presented to the emergency room where an electrocardiogram reviewed time of assessment demonstrated evidence of sinus rhythm with voltage criteria for left ventricular hypertrophy and was otherwise unremarkable chest x-ray also reviewed demonstrated no evidence of cardiomegaly or infiltrate. No additional abnormalities were identified on CT assessment of her head and chest.  Additional laboratory assessment demonstrated evidence of an equivocal troponin level in the face of normal CPK and isoenzyme levels as well as that of hemoglobin levels of approximately 7.5 to 8 g/dL without additional recent levels for comparison. She was initially hemodynamically stable and received intravenous fluids with subsequently obtained orthostatic blood pressures demonstrating no evidence of significant changes. At the time of assessment, her medications and allergies were reviewed as well as that of her past medical history and review of systems as documented. On examination, she presently appears in no discomfort nor distress and is hemodynamically stable with vital signs as documented. He had laceration is present at the lateral aspect of her right eye with associated periorbital ecchymosis. Mucous membranes presently appear dry but not frankly dehydrated. Jugular venous pressure is normal with no identified carotid bruits. Lung fields are clear to auscultation. Cardiac examination is notable for a regular rate and rhythm with a mid peaking systolic ejection murmur at the right upper sternal border rating to the left sternal border and carotid distributions with a normal bioprosthetic aortic component of the second heart sound.   A benign abdominal examination is present no peripheral edema noted.    Diagnostic Assessment and Plan: On a clinical basis, the patient presents following recent recurrent episodes of syncope potentially of a multifactorial origin with the description compatible with that of a vasa depressor mediated episode potentially exacerbated by a component of volume depletion, anemia and the effects of her aortic valve disease as well as that of antihypertensive medical regimen. At the time of evaluation, this is been discussed and no additional cardiovascular assessment is presently indicated, especially in the absence of identified cardiac arrhythmias from review of telemetry. Her intravenous fluids will be discontinued with further assessment of her anemia deferred to primary care and if endoscopic evaluation is indicated, she would be an acceptable candidate from a cardiovascular standpoint. The review of her present antihypertensive medical regimen would be advisable as well as that of noncardiovascular medications to assist in reducing her risk of recurrent orthostatic contribution to her present episodes as well as the needs of appropriately stressed hydration. Continued appropriate risk factor modification of blood pressure and serum lipids will be necessary to reduce risk of future atherosclerotic development as well as that of appropriate antibiotic prophylaxis at the time of all dental interventions to reduce risk of bacterial endocarditis. Thank you for allowing me to participate in your patient's care. Please feel free to contact me if you have any questions or concerns. Wilbert Davey.  Dona Cortes, [address] [facility]

## 2020-07-23 NOTE — CARE COORDINATION
7/23 Transition of Care: Met with patient at the bedside. She is alert and oriented. She says she fell at home and hit her head on the dresser. She feels like she passed out. She does not remember much about what happened. She said when she woke up she crawled to the dresser and called 911. She lives alone, drives and is independent. She does have 2 sons and 1 step son. She attends dialysis on MWF at [facility]. Call placed to notify of admit, no answer x2 attempts to [phone]. Will reattempt. She does not use dme equipment. Her pcp is not listed. Her pharmacy is [facility] in [address]. She plans on returning home. Her PT and OT evals are pending. Will await cardiology and further plan and continue to follow.  Cheyenne Best [phone]

## 2020-07-23 NOTE — H&P
Casey Dean M.D. History and Physical      CHIEF COMPLAINT: Fall    Reason for Admission: Fall in a patient on blood thinners    History Obtained From: Patient/EMR    HISTORY OF PRESENT ILLNESS:      The patient is a 68 y.o. female of No primary care provider on file. with significant past medical history of end-stage renal disease on hemodialysis, not on [address] Per patient,   who presents with *complain of questionable fall versus syncope in which she struck her head while she was defecating in the bathroom. Patient apparently had had hemodialysis the same day  . She denies any prodrome such as chest pain, shortness of breath, lightheadedness or any other complaints prior to falling. she came in for further evaluation. Roland Poplin happened before a couple of weeks ago/. On my evaluation resting comfortably in no distress except headache and facial pain from fall. .  /68   Pulse 70   Temp 98.8 °F (37.1 °C) (Temporal)   Resp 18   Ht 5' 3\" (1.6 m)   Wt 120 lb 9.6 oz (54.7 kg)   SpO2 98%   BMI 21.36 kg/m²   CT head unremarkable  CTA chest unremarkable  Past Medical History:        Diagnosis Date    Anemia     Aortic stenosis, severe 6/25/14    Arthritis     Bipolar disorder (HCC)     Psychosis, anxiety, depression.     CAD (coronary artery disease)     Carotid bruit 10/11/2012    Chronic kidney disease     CRF (chronic renal failure) 4/10/2012    Depression     Gout     Hemodialysis patient ([facility] [address]     kkii alanis sat    Hyperlipidemia     Hypertension     Left displaced femoral neck fracture ([address] 10/13/2014    Murmur, heart     Psychosis ([address]     Secondary hyperparathyroidism ([address]     Shingles      Past Surgical History:        Procedure Laterality Date    [place]    Rev AVF wtih vein patch    COLONOSCOPY  3/15    DIAGNOSTIC CARDIAC CATH LAB PROCEDURE  12/21/2010    No Syncope and collapse  Resolved Problems:    * No resolved hospital problems. *  ESRD      PLAN:    Admit to telemetry/observation for evaluation of syncope  Most  likely associated with fluid imbalance post hemodialysis  No evidence of chest pain shortness of breath lightheadedness or any other concerning symptoms  Pt known to cardiology - had a similar episode last week - was told by Cardiology to notify them, if recurs, - as such , Cards eval   Head CT unremarkable for bleed  Okay to resume home medications  continue hd     If no further symptoms throughout the day patient can be discharged. , if no work up planned by cardiology   Brock Lopez MD  7/23/2020  8:42 AM

## 2020-07-23 NOTE — PROGRESS NOTES
Physical Therapy    PT order received and medical chart reviewed 7/23 PM. Attempted initial evaluation however other medical staff were assessing pt at time of attempt. Will re-attempt at a later time/date. Thank you.     Carlyle Zuluaga, PT, DPT  [id number]

## 2020-07-23 NOTE — PROGRESS NOTES
Occupational Therapy  OCCUPATIONAL THERAPY INITIAL EVALUATION      Date:2020  Patient Name: Rachele Garcia  MRN: [id number]  : 1940  Room: [address] [place] [address]-A      Evaluating OT: Fernando Moran OTR/L #[id number]    AM-PAC Daily Activity Raw Score:     Recommended Adaptive Equipment: ww, TBD for additional equipment     Diagnosis: Syncope and collapse [R55]  Syncope and collapse [R55]  Referring Provider: Genny Mota MD   Patient presented to ED for fall and hit head (-) LOC  (-) imaging for fractures    Surgery: none  Pertinent Medical History: anemia, aortic stenosis, bipolar disorder, psychosis, CAD, CKD, depression, HD patient, HLD, HTN, shingles      Precautions:  Falls      Home Living: Pt lives alone in 1 story house w/ 3 JOSE or 4 JOSE (B handrails on both entrances); bed/bath/laundry on 1st floor  Bathroom setup: tub/shower w/ shower chair and handheld shower along with grab bars   Equipment owned: shower chair    Prior Level of Function: Independent with ADLs , Independent with IADLs; ambulated w/ no AD  Driving: Yes  Occupation: n/a    Pain Level: Pt c/o minimal neck pain d/t c-collar (pt reports doctor d/c)--no fractures on imaging  Cognition: A&O: 4/4; Follows 1 step directions   Memory:  good    Sequencing:  good    Problem solving: fair   Judgement/safety: fair      Functional Assessment:   Initial Eval Status  Date: 20 Treatment Status  Date: STGs/LTGs  Treatment frequency: 1-4x/wk   Feeding Independent       Grooming Stand by Assist   Independent    UB Dressing Minimal Assist   Independent    LB Dressing Minimal Assist   Independent    Bathing Minimal Assist  Independent    Toileting SBA  Independent    Bed Mobility  Supine to sit: Stand by Assist (HOB elevated)  Sit to supine: NT (pt seated in bedside chair at end of session)  Supine to sit:  Independent   Sit to supine: Independent    Functional Transfers Stand by Assist   Independent    Functional Mobility Minimal Assist w/ ww and standardized testing/informal observation of tasks, assessment of data, and development of POC/Goals.)      Assessment of current deficits   Functional mobility [x]  ADLs [x] Strength [x]  Cognition []  Functional transfers  [x] IADLs [x] Safety Awareness [x]  Endurance [x]  Fine Motor Coordination [] Balance [x] Vision/perception [] Sensation []   Gross Motor Coordination [] ROM [] Delirium []                  Motor Control []    Plan of Care (5-7 days):  ADL retraining [x]   Equipment needs [x]   Neuromuscular re-education [x] Energy Conservation Techniques [x]  Functional Transfer training [x] Patient and/or Family Education [x]  Functional Mobility training [x]  Environmental Modifications [x]  Cognitive re-training []   Compensatory techniques for ADLs [x]  Splinting Needs []   Positioning to improve overall function [x]   Therapeutic Activity [x]  Therapeutic Exercise  [x]  Visual/Perceptual: []    Delirium prevention/treatment  []   Other:  []    Rehab Potential: Good for established goals     Patient / Family Goal:  Not stated     Patient and/or family were instructed diagnosis, prognosis/goals and plan of care. Demonstrated good understanding.         Low Evaluation +   Treatment Time In:8:00            Treatment Time Out: 8:16              Treatment Charges: Mins Units   Ther Ex  37240     Manual Therapy [id number]     Thera Activities [id number] 6    ADL/Home Mgt [id number] 10 1   Neuro Re-ed 04853     Group Therapy      Orthotic manage/training  16532     Non-Billable Time     Total Timed Treatment 16 [address] [facility], OTR/L #[id number]

## 2020-07-24 VITALS
OXYGEN SATURATION: 98 % | DIASTOLIC BLOOD PRESSURE: 46 MMHG | RESPIRATION RATE: 18 BRPM | SYSTOLIC BLOOD PRESSURE: 159 MMHG | HEIGHT: 63 IN | TEMPERATURE: 97.7 F | HEART RATE: 73 BPM | WEIGHT: 125.22 LBS | BODY MASS INDEX: 22.19 KG/M2

## 2020-07-24 LAB
ABO/RH: NORMAL
ANTIBODY SCREEN: NORMAL
BLOOD BANK DISPENSE STATUS: NORMAL
BLOOD BANK PRODUCT CODE: NORMAL
BPU ID: NORMAL
DESCRIPTION BLOOD BANK: NORMAL
HCT VFR BLD CALC: 24 % (ref 34–48)
HCT VFR BLD CALC: 25.4 % (ref 34–48)
HEMOGLOBIN: 7.6 G/DL (ref 11.5–15.5)
HEMOGLOBIN: 7.8 G/DL (ref 11.5–15.5)

## 2020-07-24 PROCEDURE — 2140000000 HC CCU INTERMEDIATE R&B

## 2020-07-24 PROCEDURE — 5A1D70Z PERFORMANCE OF URINARY FILTRATION, INTERMITTENT, LESS THAN 6 HOURS PER DAY: ICD-10-PCS | Performed by: INTERNAL MEDICINE

## 2020-07-24 PROCEDURE — G0378 HOSPITAL OBSERVATION PER HR: HCPCS

## 2020-07-24 PROCEDURE — 86850 RBC ANTIBODY SCREEN: CPT

## 2020-07-24 PROCEDURE — 86901 BLOOD TYPING SEROLOGIC RH(D): CPT

## 2020-07-24 PROCEDURE — 86900 BLOOD TYPING SEROLOGIC ABO: CPT

## 2020-07-24 PROCEDURE — 6370000000 HC RX 637 (ALT 250 FOR IP): Performed by: INTERNAL MEDICINE

## 2020-07-24 PROCEDURE — 86923 COMPATIBILITY TEST ELECTRIC: CPT

## 2020-07-24 PROCEDURE — 85014 HEMATOCRIT: CPT

## 2020-07-24 PROCEDURE — P9016 RBC LEUKOCYTES REDUCED: HCPCS

## 2020-07-24 PROCEDURE — 85018 HEMOGLOBIN: CPT

## 2020-07-24 PROCEDURE — 97161 PT EVAL LOW COMPLEX 20 MIN: CPT | Performed by: PHYSICAL THERAPIST

## 2020-07-24 PROCEDURE — 36415 COLL VENOUS BLD VENIPUNCTURE: CPT

## 2020-07-24 PROCEDURE — 2580000003 HC RX 258: Performed by: INTERNAL MEDICINE

## 2020-07-24 PROCEDURE — 90935 HEMODIALYSIS ONE EVALUATION: CPT | Performed by: INTERNAL MEDICINE

## 2020-07-24 RX ORDER — 0.9 % SODIUM CHLORIDE 0.9 %
20 INTRAVENOUS SOLUTION INTRAVENOUS ONCE
Status: DISCONTINUED | OUTPATIENT
Start: 2020-07-24 | End: 2020-07-24 | Stop reason: HOSPADM

## 2020-07-24 RX ADMIN — VALPROIC ACID 250 MG: 250 CAPSULE, LIQUID FILLED ORAL at 10:17

## 2020-07-24 RX ADMIN — ACETAMINOPHEN 650 MG: 325 TABLET ORAL at 10:17

## 2020-07-24 RX ADMIN — ACETAMINOPHEN 650 MG: 325 TABLET ORAL at 02:48

## 2020-07-24 RX ADMIN — PANTOPRAZOLE SODIUM 40 MG: 40 TABLET, DELAYED RELEASE ORAL at 10:17

## 2020-07-24 RX ADMIN — Medication 10 ML: at 10:18

## 2020-07-24 ASSESSMENT — PAIN SCALES - GENERAL
PAINLEVEL_OUTOF10: 8
PAINLEVEL_OUTOF10: 0
PAINLEVEL_OUTOF10: 0
PAINLEVEL_OUTOF10: 4

## 2020-07-24 ASSESSMENT — PAIN DESCRIPTION - PAIN TYPE: TYPE: ACUTE PAIN

## 2020-07-24 ASSESSMENT — PAIN DESCRIPTION - LOCATION: LOCATION: HEAD

## 2020-07-24 ASSESSMENT — PAIN DESCRIPTION - PROGRESSION: CLINICAL_PROGRESSION: GRADUALLY WORSENING

## 2020-07-24 ASSESSMENT — PAIN DESCRIPTION - ORIENTATION: ORIENTATION: RIGHT;MID

## 2020-07-24 ASSESSMENT — PAIN DESCRIPTION - DESCRIPTORS: DESCRIPTORS: ACHING;DISCOMFORT;THROBBING

## 2020-07-24 NOTE — DISCHARGE SUMMARY
Physician Discharge Summary     Patient ID:  Jus Cunningham  [id number]  59 y.o.  1940    Admit date: 7/22/2020    Discharge date and time: 7/24/2020    Admission Diagnoses:   Patient Active Problem List   Diagnosis    CAD (coronary artery disease)    [facility] to renal insufficiency ([facility])    Gout    Bipolar disorder ([facility]    Anemia    Coronary atherosclerosis of native coronary artery    S/P AVR    [facility]oxysmal [facility] fibrillation) ([facility])    S/P Maze operation for atrial fibrillation    ESRD (end stage renal disease) on dialysis ([facility]    Hyperkalemia, diminished renal excretion    Chest pain    Hyperlipidemia LDL goal <100    Essential hypertension    Syncope and collapse    Facial laceration    Aortic valve disease    Pure hypercholesterolemia    Stage 5 chronic kidney disease on chronic dialysis [facility])       Discharge Diagnoses: Syncope recurrent    Consults: Cardiology    Procedures: Hemodialysis    Hospital Course: The patient is a 68 y.o. female of No primary care provider on file. with significant past medical history of end-stage renal disease on hemodialysis, not on [address] [place] [address] Road Per patient,   who presents with *complain of questionable fall versus syncope in which she struck her head while she was defecating in the bathroom. Patient apparently had had hemodialysis the same day  . She denies any prodrome such as chest pain, shortness of breath, lightheadedness or any other complaints prior to falling. she came in for further evaluation. Abran Obregon happened before a couple of weeks ago/. On my evaluation resting comfortably in no distress except headache and facial pain from fall.  .  /68   Pulse 70   Temp 98.8 °F (37.1 °C) (Temporal)   Resp 18   Ht 5' 3\" (1.6 m)   Wt 120 lb 9.6 oz (54.7 kg)   SpO2 98%   BMI 21.36 kg/m²   CT head unremarkable  CTA chest unremarkable  Admit to telemetry/observation for evaluation of syncope with cardiac history/valvular disease  Most  likely associated with fluid imbalance post hemodialysis  No evidence of chest pain shortness of breath lightheadedness or any other concerning symptoms  Pt known to cardiology - had a similar episode last week - was told by Cardiology to notify them, if recurs, - as such , Cards eval   Head CT unremarkable for bleed  Okay to resume home medications-adjust blood pressure medications  continue hd -transfuse 1 unit PRBC with hemodialysis today for hemoglobin of 7.6 prior to discharge    Recent Labs     20  1456 20  0633 20  0026   WBC 8.1 6.2  --    HGB 7.8* 7.6* 7.6*   HCT 24.2*  24.6* 23.7* 24.0*    249  --        Recent Labs     20  1456 20  0633    138   K 4.8 5.0   CL 92* 97*   CO2 32* 27   BUN 21 40*   CREATININE 2.4* 4.3*   CALCIUM 9.3 8.7       Ct Head Wo Contrast    Result Date: 2020  Patient MRN:  [id number] : 1940 Age: 68 years Gender: Female Order Date:  2020 2:30 PM EXAM: CT HEAD WO CONTRAST INDICATION:  fall, head injury, r/o ICH fall, head injury, r/o ICH  COMPARISON: None FINDINGS: PARENCHYMA:No mass effect, edema or hemorrhage. Mild volume loss is seen in the cerebrum with mild chronic microvascular ischemic changes. VENTRICLES:No hydrocephalus. CALVARIUM:The calvarium is intact without fracture or focal lesion. Right periorbital and frontal facial hematoma is seen. Small locules of gas in the subcutaneous space indicate laceration. SINUSES:The visualized paranasal sinuses and mastoids are clear. No skull fracture or acute intracranial abnormality. Ct Cervical Spine Wo Contrast    Result Date: 2020  Patient MRN:  [id number] : 1940 Age: 68 years Gender: Female Order Date:  2020 2:30 PM EXAM: CT CERVICAL SPINE WO CONTRAST INDICATION:  fall, pain, r/o fx fall, pain, r/o fx COMPARISON: None Multiple CT sections were obtained with sagittal and coronal MPR reconstructions.  FINDINGS: BONES: No fracture or joint enlarged. Status post CABG. No pulmonary vascular congestion or edema. No pulmonary embolism is identified. The pulmonary arterial trunk is of normal caliber. Minimal dependent atelectasis is seen in the lungs. Lungs otherwise clear. 1-2 mm peripheral pulmonary nodule is seen in the lingula (series 5, image 38). The central airway is clear. No pneumothorax or pleural effusion. No lymphadenopathy is seen in the chest. Postoperative changes are seen prior sternotomy. Lungs are otherwise unremarkable. Subcentimeter nodules are seen in the left thyroid lobe. 1. No acute cardiopulmonary abnormality. 2. Stable ectasia of the ascending aorta to 3.7 cm. 3. No pulmonary embolism. 4. Clear lungs. 5. 1-2 mm lingular nodule of doubtful clinical significance. If the patient is at a high risk for malignancy, per the recommendations of the Fleischner Society, follow-up CT of the chest should be considered in 12 months. Otherwise, no future follow-up is needed. Discharge Exam:    HEENT: NCAT,  PERRLA, No JVD  Heart:  RRR, no murmurs, gallops, or rubs. Lungs:  CTA bilaterally, no wheeze, rales or rhonchi  Abd: bowel sounds present, nontender, nondistended, no masses  Extrem:  No clubbing, cyanosis, or edema    Disposition: home     Patient Condition at Discharge: Stable    Patient Instructions:      Medication List      ASK your doctor about these medications    amLODIPine 5 MG tablet  Commonly known as:  NORVASC     aspirin 81 MG tablet     b complex vitamins capsule     eszopiclone 2 MG Tabs  Commonly known as:  LUNESTA     imipramine 50 MG tablet  Commonly known as:  TOFRANIL     Nitrostat 0.4 MG SL tablet  Generic drug:  nitroGLYCERIN     pantoprazole 40 MG tablet  Commonly known as:  Protonix  Take 1 tablet by mouth daily. sevelamer 800 MG tablet  Commonly known as:  RENVELA  Ask about: Which instructions should I use?      * valproic acid 250 MG capsule  Commonly known as:  DEPAKENE     * valproic acid 250 MG capsule  Commonly known as:  Kaitlin Mims         * This list has 2 medication(s) that are the same as other medications prescribed for you. Read the directions carefully, and ask your doctor or other care provider to review them with you. Activity: activity as tolerated  Diet: cardiac diet and renal diet    Pt has been advised to: Follow-up with No primary care provider on file. in 1 week.   Follow-up with consultants as recommended by them    Note that over 30 minutes was spent in preparing discharge papers, discussing discharge with patient, medication review, etc.    Signed:  Fernanda Kern MD  7/24/2020  10:47 AM

## 2020-07-24 NOTE — FLOWSHEET NOTE
07/24/20 1742   Vital Signs   BP (!) 159/46   Temp 97.7 °F (36.5 °C)   Pulse 73   Resp 18   Weight 125 lb 3.5 oz (56.8 kg)   Percent Weight Change 0   Post-Hemodialysis Assessment   Post-Treatment Procedures Blood returned; Access bleeding time < 10 minutes   Machine Disinfection Process Acid/Vinegar Clean;Heat Disinfect; Exterior Machine Disinfection   Rinseback Volume (ml) 300 ml   Total Liters Processed (l/min) 66.8 l/min   Dialyzer Clearance Lightly streaked   Duration of Treatment (minutes) 210 minutes   Hemodialysis Intake (ml) 300 ml   Hemodialysis Output (ml) 2300 ml   NET Removed (ml) 2000 ml   Tolerated Treatment Good   Patient Response to Treatment pt ramon well   Bilateral Breath Sounds Clear   Edema None

## 2020-07-24 NOTE — PROGRESS NOTES
Physical Therapy  Physical Therapy Initial Assessment     Name: Luis Felipe Wayne  : 1940  MRN: [id number]    Referring Provider:  Devi Ospina MD    Date of Service: 2020    Evaluating PT:  Stephon Dyson, PT, DPT [id number]    Room #:  9083/7747-W  Diagnosis:  Syncope and collapse  PMHx:  HTN, anemia, HLD, heart murmur, carotid burit, CAD, chronic renal failure on HD, shingles  Precautions:  L UE AV fistula  Equipment Needs:  none    SUBJECTIVE:    Pt lives alone in a single story home with 4 stairs to enter and B rail. Bed is on first floor and bath is on first floor. Pt ambulated with no AD PTA. Pt has personal tri-pod cane in room. OBJECTIVE:   Initial Evaluation  Date: 20 Treatment Short Term/ Long Term   Goals   AM-PAC 6 Clicks 93/65     Was pt agreeable to Eval/treatment? yes     Does pt have pain? 4/10 headache     Bed Mobility  Rolling: Independent  Supine to sit: Independent  Sit to supine: Independent  Scooting: Independent  NA   Transfers Sit to stand: Independent  Stand to sit: Independent  Stand pivot: NT  NA   Ambulation    100 feet with personal tripod cane Mod Independent   NA   Stair negotiation: ascended and descended NT, pt declines stairs at this time. States she has no concerns regarding stair negotiation upon returning home. NA   ROM BUE:  WNL  BLE:  WNL     Strength BUE:  WNL  BLE:  WNL     Balance Sitting EOB:  Independent  Dynamic Standing: Mod Independent with tripod cane  NA     Pt is A & O x 4  Sensation:  Pt denies numbness and tingling to extremities  Edema:  unremarkable    Patient education  Pt educated on safety with mobility, role of therapy    Patient response to education:   Pt verbalized understanding Pt demonstrated skill Pt requires further education in this area   yes yes yes     ASSESSMENT:  Comments:    Pt resting semi-supine upon arrival, agreeable to PT eval. Pt demonstrated independence with all functional mobility noted above.  Pt amb with slow, steady gait speed with personal tripod cane with good sequencing noted. She demonstrates safe technique with sit<>stand transfers from all surfaces. Pt is agreeable that there are no skilled PT needs at this time. Pt reports previous falls have been due to heat and not eating well prior to dialysis treatments. She states she feels she is at baseline for mobility this date. Pt returned to bed at her request with all needs in reach. Pt's/ family goals   1. To return home independently    Patient and or family understand(s) diagnosis, prognosis, and plan of care. yes    PLAN:  Based on pt's current level of functional independence, this pt is not a candidate for continued skilled PT services. Please re-consult if pt experiences functional decline. Thank you. Time in  0848  Time out  0900    Evaluation Time includes thorough review of current medical information, gathering information on past medical history/social history and prior level of function, completion of standardized testing/informal observation of tasks, assessment of data and education on plan of care and goals.     CPT codes:  [x] Low Complexity PT evaluation 75644  [] Moderate Complexity PT evaluation 67054  [] High Complexity PT evaluation 22696  [] PT Re-evaluation 39545  [] Gait training 45881 0 minutes  [] Manual therapy 53900 0 minutes  [] Therapeutic activities 98163 0 minutes  [] Therapeutic exercises 71758 0 minutes  [] Neuromuscular reeducation 15713 0 minutes     Bhavin Estrada PT, DPT  [id number]

## 2020-07-24 NOTE — CONSULTS
Consult Note  NEPHROLOGY    Reason for Consult:  Management of Dialysis Needs    Requesting Physician:  Dr Helen Morales    Chief Complaint:  Syncope and Collapse     History Obtained From:  patient, electronic medical record    History of Present Ilness: This is a 68 y.o.  female with a H/O ESRD who now presents to ED after suffering a fall at home. Vitals upon arrival included 98.2, 143/64, 75, 18, 100%. Pertinent labs included WBC 8.1 and Hgb 7.8. Na 136, K 4.8, Ch 92, C)2 32, BUN 21 and creat 2.4. CXR - no active process. Patient was subsequently admitted for further w/u. Currently upon exam pt is in no acute distress: she describes that received dialysis day of admission and afterwards at home she woke up on the floor in the bathroom. She had no lightheadedness or dizziness before the event and does not member anything before the event happened. States this has not happened before she does have an abrasion to the area above the right eye that will be in need of suturing. Patient does take aspirin as a anticoagulation no other anticoagulation admitted to. Patient at this time is back to baseline alert and oriented. No CP or SOB. No N/V/D. She is oliguric. Past Medical History:        Diagnosis Date    Anemia     Aortic stenosis, severe 6/25/14    Arthritis     Bipolar disorder (HCC)     Psychosis, anxiety, depression.     CAD (coronary artery disease)     Carotid bruit 10/11/2012    Chronic kidney disease     CRF (chronic renal failure) 4/10/2012    Depression     Gout     [facility]od[facility]     kiki alanis sat    Hyperlipidemia     Hypertension     Left displaced femoral neck fracture ([address] 10/13/2014    Murmur, heart     Psychosis ([address]     Secondary hyperparathyroidism ([address]     Shingles        Past Surgical History:        Procedure Laterality Date    AORTIC VALVE REPLACEMENT  8/5/14    Dr Mo [facility] 5  4-30-08    Rev AVF [place] vein patch    COLONOSCOPY  3/15    DIAGNOSTIC CARDIAC CATH LAB PROCEDURE  12/21/2010    No significant epicardial CAD. Systemic HTN. Concentric left ventricular hypertrophy with hyperdynamic left ventricular systolic function. Elevated left ventricular end diastolic pressure. Trace aortic regurgitation.  DIALYSIS FISTULA CREATION  1-8-08    L AVF [facility]ino Gravely)    FRACTURE SURGERY  9/8/14    left hip hemiarthroplasty    HYSTERECTOMY      Partial (S/P 3 vaginal deliveries and 1 miscarriage).  TRANSTHORACIC ECHOCARDIOGRAM  6/19/2012    Normal LV size, wall thickness, wall motion and systolic function with stage 1 left ventricular diastolic dysfunction, normal right ventricular size and function, moderate aortic valve sclerosis with mild to moderate aortic stenosis, trace aortic regurgitation and there is aortic root sclerosis/calcification.  UPPER GASTROINTESTINAL ENDOSCOPY  3/15       Current Medications:    Current Facility-Administered Medications: imipramine (TOFRANIL) tablet 50 mg, 50 mg, Oral, Nightly  pantoprazole (PROTONIX) tablet 40 mg, 40 mg, Oral, Daily  valproic acid (DEPAKENE) capsule 250 mg, 250 mg, Oral, QAM  valproic acid (DEPAKENE) capsule 500 mg, 500 mg, Oral, Nightly  sodium chloride flush 0.9 % injection 10 mL, 10 mL, Intravenous, 2 times per day  sodium chloride flush 0.9 % injection 10 mL, 10 mL, Intravenous, PRN  acetaminophen (TYLENOL) tablet 650 mg, 650 mg, Oral, Q6H PRN **OR** acetaminophen (TYLENOL) suppository 650 mg, 650 mg, Rectal, Q6H PRN  polyethylene glycol (GLYCOLAX) packet 17 g, 17 g, Oral, Daily PRN  promethazine (PHENERGAN) tablet 12.5 mg, 12.5 mg, Oral, Q6H PRN **OR** ondansetron (ZOFRAN) injection 4 mg, 4 mg, Intravenous, Q6H PRN    Allergies: Other    Social History:      reports that she has never smoked. She has never used smokeless tobacco. She reports previous alcohol use. She reports that she does not use drugs. Family History:     History reviewed.  No CONTRAST INDICATION:  fall, head injury, r/o ICH fall, head injury, r/o ICH  COMPARISON: None FINDINGS: PARENCHYMA:No mass effect, edema or hemorrhage. Mild volume loss is seen in the cerebrum with mild chronic microvascular ischemic changes. VENTRICLES:No hydrocephalus. CALVARIUM:The calvarium is intact without fracture or focal lesion. Right periorbital and frontal facial hematoma is seen. Small locules of gas in the subcutaneous space indicate laceration. SINUSES:The visualized paranasal sinuses and mastoids are clear. No skull fracture or acute intracranial abnormality. Ct Cervical Spine Wo Contrast    Result Date: 2020  Patient MRN:  [id number] : 1940 Age: 68 years Gender: Female Order Date:  2020 2:30 PM EXAM: CT CERVICAL SPINE WO CONTRAST INDICATION:  fall, pain, r/o fx fall, pain, r/o fx COMPARISON: None Multiple CT sections were obtained with sagittal and coronal MPR reconstructions. FINDINGS: BONES: No fracture or joint dislocation. Vertebral body heights are preserved. No bony destructive lesion. DISC SPACES: Small disc bulges noted at multiple levels helping mild central canal stenosis at C6-7. Mild neural foraminal stenoses at multiple levels. PARASPINAL SOFT TISSUES:Unremarkable. MISCELLANEOUS:No additional significant finding noted. 1. No fracture or joint dislocation. 2. Mild degenerative changes, as described. Xr Chest Portable    Result Date: 2020  Patient MRN: [id number] : 1940 Age:  68 years Gender: Female Order Date: 2020 2:30 PM Exam: XR CHEST PORTABLE Number of Images: 1 view Indication:   syncope syncope Comparison: 2019 FINDINGS: There are stable postoperative changes. Heart size is upper normal. Pulmonary vascularity is normal. Lungs are clear. Neither costophrenic angle is visibly blunted. No active process. Stable postoperative changes.      Cta Chest W Contrast    Result Date: 2020  Patient MRN:  [id number] : 1940 Age: 68 years Gender: Female Order Date:  7/22/2020 2:30 PM EXAM: CTA CHEST W CONTRAST INDICATION:  chest pain, r/o dissection chest pain, r/o dissection COMPARISON: None Technique: Low-dose CT  acquisition technique included one of following options; 1 . Automated exposure control, 2. Adjustment of MA and or KV according to patient's size or 3. Use of iterative reconstruction. Contiguous spiral images were obtained in the axial plane, prior to and following the administration of intravenous contrast using CT angiographic protocol. Sagittal and coronal images were reconstructed from the axial plane acquisition. Addition 3-D MIP reconstructions were presented to aid in the interpretation of this study. FINDINGS: The thoracic aorta is tortuous. The ascending aorta is ectatic, measuring approximately 3.7 cm in width. No evidence of beryl aneurysm, dissection or rupture. No intramural hematoma is identified on the unenhanced images. The heart is probably enlarged. Status post CABG. No pulmonary vascular congestion or edema. No pulmonary embolism is identified. The pulmonary arterial trunk is of normal caliber. Minimal dependent atelectasis is seen in the lungs. Lungs otherwise clear. 1-2 mm peripheral pulmonary nodule is seen in the lingula (series 5, image 38). The central airway is clear. No pneumothorax or pleural effusion. No lymphadenopathy is seen in the chest. Postoperative changes are seen prior sternotomy. Lungs are otherwise unremarkable. Subcentimeter nodules are seen in the left thyroid lobe. 1. No acute cardiopulmonary abnormality. 2. Stable ectasia of the ascending aorta to 3.7 cm. 3. No pulmonary embolism. 4. Clear lungs. 5. 1-2 mm lingular nodule of doubtful clinical significance. If the patient is at a high risk for malignancy, per the recommendations of the Fleischner Society, follow-up CT of the chest should be considered in 12 months. Otherwise, no future follow-up is needed. Assessment/Plan    1. ESRD on HD via LUE AVF at [address] MWF  HD today with goal 3.0 L off  per chronic orders     2. Syncope and Collapse  Unremarkable work up     3. HTN of ESRD  At goal   Follow     4. Anemia of CKD  H/H low but stable  On Mircera as OP     5.  Sec HPTH--follow on renvela  Follow Ca and PO4 levels         Thank you  [facility] for allowing us to participate in care of CYRUS Titus - CNP  7/24/2020  9:47 AM     Pt seen and examined agree with above  Hd mwf  W/u for fall  [facility]

## 2020-07-24 NOTE — PROGRESS NOTES
Dr. Lisandra Mishra notified of nephrology consult via [employer]. Consent obtained for Hemodialysis.

## 2020-09-25 ENCOUNTER — OFFICE VISIT (OUTPATIENT)
Dept: CARDIOLOGY CLINIC | Age: 80
End: 2020-09-25
Payer: MEDICARE

## 2020-09-25 VITALS
HEIGHT: 63 IN | HEART RATE: 74 BPM | DIASTOLIC BLOOD PRESSURE: 64 MMHG | SYSTOLIC BLOOD PRESSURE: 138 MMHG | WEIGHT: 120.2 LBS | RESPIRATION RATE: 18 BRPM | BODY MASS INDEX: 21.3 KG/M2

## 2020-09-25 PROCEDURE — G8420 CALC BMI NORM PARAMETERS: HCPCS | Performed by: INTERNAL MEDICINE

## 2020-09-25 PROCEDURE — 4040F PNEUMOC VAC/ADMIN/RCVD: CPT | Performed by: INTERNAL MEDICINE

## 2020-09-25 PROCEDURE — G8399 PT W/DXA RESULTS DOCUMENT: HCPCS | Performed by: INTERNAL MEDICINE

## 2020-09-25 PROCEDURE — 1036F TOBACCO NON-USER: CPT | Performed by: INTERNAL MEDICINE

## 2020-09-25 PROCEDURE — G8427 DOCREV CUR MEDS BY ELIG CLIN: HCPCS | Performed by: INTERNAL MEDICINE

## 2020-09-25 PROCEDURE — 99213 OFFICE O/P EST LOW 20 MIN: CPT | Performed by: INTERNAL MEDICINE

## 2020-09-25 PROCEDURE — 1123F ACP DISCUSS/DSCN MKR DOCD: CPT | Performed by: INTERNAL MEDICINE

## 2020-09-25 PROCEDURE — 1090F PRES/ABSN URINE INCON ASSESS: CPT | Performed by: INTERNAL MEDICINE

## 2020-09-25 PROCEDURE — 93000 ELECTROCARDIOGRAM COMPLETE: CPT | Performed by: INTERNAL MEDICINE

## 2020-09-25 RX ORDER — PHENOL 1.4 %
1 AEROSOL, SPRAY (ML) MUCOUS MEMBRANE DAILY
Status: ON HOLD | COMMUNITY
End: 2022-08-27

## 2020-09-25 NOTE — PROGRESS NOTES
OFFICE VISIT        PRIMARY CARE PHYSICIAN:    No primary care provider on file. ALLERGIES / SENSITIVITIES:    Allergies   Allergen Reactions    Other      Pt states she cannot take any type of pain medication, she throws up and never stops        REVIEWED MEDICATIONS:      Current Outpatient Medications:     calcium carbonate 600 MG TABS tablet, Take 1 tablet by mouth daily, Disp: , Rfl:     Multiple Vitamins-Minerals (MULTI COMPLETE PO), Take by mouth, Disp: , Rfl:     sevelamer (RENVELA) 800 MG tablet, Take 1 tablet by mouth 3 times daily (with meals) , Disp: , Rfl:     valproic acid (DEPAKENE) 250 MG capsule, Take 500 mg by mouth nightly, Disp: , Rfl:     nitroGLYCERIN (NITROSTAT) 0.4 MG SL tablet, Place 0.4 mg under the tongue every 5 minutes as needed for Chest pain up to max of 3 total doses. If no relief after 1 dose, call 911., Disp: , Rfl:     eszopiclone (LUNESTA) 2 MG TABS, Take 2 mg by mouth nightly as needed. , Disp: , Rfl:     aspirin 81 MG tablet, Take 81 mg by mouth daily, Disp: , Rfl:     pantoprazole (PROTONIX) 40 MG tablet, Take 1 tablet by mouth daily. 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 Chinedu Boucher is a pleasant, 77-year-old lady with cardiovascular history as described below. She was last seen by me in the office on 10/10/2020. She was hospitalized in July after having had a second syncopal episode: She had a syncopal episode around 3 weeks prior to that.   By description, these syncopal episodes are multifactorial and appear to be the result of possible volume depletion, orthostasis, and vasovagal reaction in combination with anemia and aortic valve disease. Both episodes occurred while standing for a while and then going to urinate or strain to pass a bowel movement. She has not had a syncopal episode since discharge from the hospital.  She tells me that her potassium was high at dialysis and she is supposed to have a repeat potassium today. She otherwise, since her hospitalization, has been doing well again without any recurrent syncope or presyncope. She walks for exercise. She denies chest pain or dyspnea with her daily activities. She denies orthopnea, PND's, lower extremity swelling or palpitations. REVIEW OF SYSTEMS:    Constitutional: no fevers or chills or fatigue              HEENT: denies changes in hearing or vision, No difficulty swallowing              Endocrine: no weight changes              Musculoskeletal: no arthralgias or myalgias              Skin: denies rashes or itching or lesions              Heme/Lymph: denies bleeding              Heart: as above              Lungs: as above              GI: denies abdominal pain, vomiting, diarrhea or rectal bleeding or tarry stools              : denies hematuria, dysuria              Psych: denies mood changed, anxiety, depression.              Neuro: denies numbness, tingling, tremors.                    CARDIOVASCULAR HISTORY:      1. Hypertension. 2. Hyperlipidemia. 3. Coronary artery disease:  a. 12/21/2010 Cardiac catheterization: No significant epicardial coronary artery disease. Systemic hypertension. Concentric left ventricular hypertrophy with hyperdynamic left ventricular systolic function. Elevated left ventricular end diastolic pressure. Trace aortic regurgitation.   b.  7/11/2014 Right and left heart catheterization:  Severe calcific aortic stenosis.  Systemic hypertension.  Patent coronary arteries.  Patent carotid arteries.  Normal right sided pressures. c.  [facility] stress test done on 8/7/2019 showed no evidence of fixed or reversible perfusion abnormality with normal wall motion and a calculated ejection fraction of 77%. 4. Severe aortic stenosis.             a.  6/25/2014 Echocardiogram showed normal left ventricular size, wall motion and systolic                             function with Stage 1 left ventricular diastolic dysfunction. Normal right ventricular size and               function. Moderate aortic valve sclerosis with severe aortic stenosis and mild aortic regurgitation.              b.  Aortic valve mean gradient of 47 mmHg with an aortic valve area of 0.65 sq cm on cardiac               catheterization on 7/11/2014.               c.  Aortic valve replacement with a 21 mm Medtronic Mosaic bioprosthesis tissue valve, 7/30/2014.                    d.  Echocardiogram done on 03/17/2016 showed normal left ventricular size with borderline left ventricular hypertrophy with an ejection fraction of 75% with stage 1 left ventricular diastolic dysfunction. Normal right ventricular        size and function.  Mildly dilated left atrium. Mild mitral annular calcification with mild to moderate mitral regurgitation. Bioprosthesis in the aortic position, which is well seated, but the individual aortic leaflets were not well             visualized with a calculated mean gradient across the aortic prosthesis of 25.25 mmHg with no aortic regurgitation. Sclerotic and calcified aortic root.     e.  Echocardiogram done on 3/12/2020 showed normal left ventricular size with borderline concentric left ventriclar hypertrophy with no regional wall motion abnormality with an estimated ejection fraction of 75-80% with stage 2 left ventricular diastolic dysfunction. Normal right ventricular size and function. Moderately dilated left atrium.   Focal calcification of the mitral valve leaflets with moderate mitral annular calcification without mitral stenosis and with mild to moderate mitral regurgitation. Mild prosthetic aortic valve, which appeared well seated with a mean gradient of 28.33 mmHg with no aortic regurgitation. Mild to moderate tricuspid regurgitation. 5. Unroofing of anomalous right coronary artery at the time of the aortic valve surgery, 2014.    6. Paroxysmal atrial fibrillation.        a.  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    7. Carotid ultrasound done on 2014 was read by Dr. Rex Garcia (Vascular surgery) as showing 30% stenosis on the right and no stenosis on the left. 8.  Syncopal episodes:  Multi-factorial.     PAST MEDICAL HISTORY:   1. As under cardiovascular history. 2. Anemia. 3. Chronic kidney disease (Stage 5) likely secondary to a hypertensive nephrosclerosis. a.  2008 Insertion of AV fistula, left arm.  b.  Secondary hyperparathyroidism. c.  Patient is on hemodialysis 3 days a week.          4.   Bipolar disorder. Psychosis. Anxiety. Depression. 5.   Gout. 6.  S/P partial hysterectomy (S/P 3 vaginal deliveries and 1 miscarriage). 7.  Left hip hemiarthroplasty on 2014 for left transcervical femoral neck displaced fracture, S/P fall        on 2014.    8. Hypercalcemia   9. 2015 screening colonoscopy and EGD with biopsy showed gastritis and mild sigmoid diverticulosis. 10. Hospitalized in 2018 with diarrhea and was diagnosed with colitis.        FAMILY HISTORY:   Mother: , 80, complications from Alzheimers disease. Father: , 68, colon cancer.      SOCIAL HISTORY:   Patient does not smoke. She does not drink alcohol.   since 2015       O:  COMPLETE PHYSICAL EXAM:      /64   Pulse 74   Resp 18   Ht 5' 3\" (1.6 m)   Wt 120 lb 3.2 oz (54.5 kg)   BMI 21.29 kg/m² General:           Elderly lady in no acute distress, appears anxious is a thin well-developed elderly female               AOTT & Neck:   Atraumatic, normocephalic head. No JVD. No carotid bruits.  Carotid upstrokes noted bilaterally. No thyromegaly. Sclerae      not icteric. No xanthelasmas.  Mucous membranes moist.              Chest:             Mid sternotomy scar well-healed. Symmetrical and nontender. No deformities. Symmetrical and tender.              Lungs:             Clear to auscultation bilaterally.              Heart:              Normal S1 and S2.  No S3 or S4. 2/6 systolic Murmur heard all over the precordium, best over the right upper sternal      border.               [id number]:        BUTC without organomegaly or masses, without organomegaly or masses.  Normal bowel sounds. No bruits.                Extremities:     No significant lower extremity edema. AV fistula left forearm with bruit and thrill.               Skin:                Normal turgor. No rashes or ulcers noted.                Neuro:             Oriented x 3.  No motor or sensory deficits detected. REVIEW OF DIAGNOSTIC TESTS:    1.  H&H from 7/24/2020:  7.8 and 25.4. 2. EKG done today showed sinus rhythm and was within normal limits. ASSESSMENT / DIAGNOSIS:   1.  Anomalous coronary artery, S/P unroofing of the right coronary artery during open heart surgery, 7/30/2014.    2.  S/p aortic valve replacement with a tissue prosthesis, 7/30/2014 with elevated gradient across the tissue prosthesis on the most recent echocardiogram in 3/2020.    3.  Paroxysmal atrial fibrillation, S/P modified Maze procedure and bipolar radiofrequency ablation procedure, 7/30/2014, patient is in sinus rhythm.    4.  End stage renal disease on hemodialysis through left wrist AV fistula, Tuesday's, Thursday's and Saturday's.    5.  Secondary hyperparathyroidism.    6.  Anemia of chronic disease. 7.  Hypertension: Adequately controlled.    8.  History of hyperlipidemia.    9.  Bipolar disorder. 10.  History of gout.    11.  Mild to moderate mitral regurgitation on echocardiogram in 3/2020.    12. History of syncopal episodes:  Multi-factorial.         TREATMENT PLAN:  1. Reassure. 2.  Patient advised against abrupt position changes from supine to sitting or standing and not to  the same position for an extended period of time without continuously moving her lower extremities. 3.  Follow up with Cardiology on a prn basis:  Consider repeat echocardiogram at that time. [facility] [address] [place] [address].  [facility] [id number] [phone]

## 2021-12-10 ENCOUNTER — OFFICE VISIT (OUTPATIENT)
Dept: CARDIOLOGY CLINIC | Age: 81
End: 2021-12-10
Payer: MEDICARE

## 2021-12-10 VITALS
SYSTOLIC BLOOD PRESSURE: 132 MMHG | WEIGHT: 117.6 LBS | BODY MASS INDEX: 20.84 KG/M2 | DIASTOLIC BLOOD PRESSURE: 82 MMHG | HEIGHT: 63 IN | RESPIRATION RATE: 18 BRPM | HEART RATE: 75 BPM

## 2021-12-10 DIAGNOSIS — I38 VHD (VALVULAR HEART DISEASE): ICD-10-CM

## 2021-12-10 DIAGNOSIS — F31.9 BIPOLAR AFFECTIVE DISORDER, REMISSION STATUS UNSPECIFIED (HCC): ICD-10-CM

## 2021-12-10 DIAGNOSIS — Z95.2 S/P AVR: ICD-10-CM

## 2021-12-10 DIAGNOSIS — Z87.898 H/O SYNCOPE: ICD-10-CM

## 2021-12-10 DIAGNOSIS — Z99.2 ANEMIA DUE TO CHRONIC KIDNEY DISEASE, ON CHRONIC DIALYSIS (HCC): ICD-10-CM

## 2021-12-10 DIAGNOSIS — N18.6 ANEMIA DUE TO CHRONIC KIDNEY DISEASE, ON CHRONIC DIALYSIS (HCC): ICD-10-CM

## 2021-12-10 DIAGNOSIS — Z86.79 S/P MAZE OPERATION FOR ATRIAL FIBRILLATION: ICD-10-CM

## 2021-12-10 DIAGNOSIS — N25.81 HYPERPARATHYROIDISM DUE TO RENAL INSUFFICIENCY (HCC): ICD-10-CM

## 2021-12-10 DIAGNOSIS — Z87.39 H/O: GOUT: ICD-10-CM

## 2021-12-10 DIAGNOSIS — Z86.39 H/O HYPERLIPIDEMIA: ICD-10-CM

## 2021-12-10 DIAGNOSIS — N18.6 ESRD (END STAGE RENAL DISEASE) ON DIALYSIS (HCC): ICD-10-CM

## 2021-12-10 DIAGNOSIS — I10 ESSENTIAL HYPERTENSION: ICD-10-CM

## 2021-12-10 DIAGNOSIS — D63.1 ANEMIA DUE TO CHRONIC KIDNEY DISEASE, ON CHRONIC DIALYSIS (HCC): ICD-10-CM

## 2021-12-10 DIAGNOSIS — Z99.2 ESRD (END STAGE RENAL DISEASE) ON DIALYSIS (HCC): ICD-10-CM

## 2021-12-10 DIAGNOSIS — Q24.5 ANOMALOUS RIGHT CORONARY ARTERY: Primary | ICD-10-CM

## 2021-12-10 DIAGNOSIS — I34.0 MITRAL VALVE INSUFFICIENCY, UNSPECIFIED ETIOLOGY: ICD-10-CM

## 2021-12-10 DIAGNOSIS — Z98.890 S/P MAZE OPERATION FOR ATRIAL FIBRILLATION: ICD-10-CM

## 2021-12-10 DIAGNOSIS — I48.0 PAF (PAROXYSMAL ATRIAL FIBRILLATION) (HCC): ICD-10-CM

## 2021-12-10 PROCEDURE — 1123F ACP DISCUSS/DSCN MKR DOCD: CPT | Performed by: INTERNAL MEDICINE

## 2021-12-10 PROCEDURE — G8420 CALC BMI NORM PARAMETERS: HCPCS | Performed by: INTERNAL MEDICINE

## 2021-12-10 PROCEDURE — 4040F PNEUMOC VAC/ADMIN/RCVD: CPT | Performed by: INTERNAL MEDICINE

## 2021-12-10 PROCEDURE — 1090F PRES/ABSN URINE INCON ASSESS: CPT | Performed by: INTERNAL MEDICINE

## 2021-12-10 PROCEDURE — 93000 ELECTROCARDIOGRAM COMPLETE: CPT | Performed by: INTERNAL MEDICINE

## 2021-12-10 PROCEDURE — G8484 FLU IMMUNIZE NO ADMIN: HCPCS | Performed by: INTERNAL MEDICINE

## 2021-12-10 PROCEDURE — G8399 PT W/DXA RESULTS DOCUMENT: HCPCS | Performed by: INTERNAL MEDICINE

## 2021-12-10 PROCEDURE — G8427 DOCREV CUR MEDS BY ELIG CLIN: HCPCS | Performed by: INTERNAL MEDICINE

## 2021-12-10 PROCEDURE — 99214 OFFICE O/P EST MOD 30 MIN: CPT | Performed by: INTERNAL MEDICINE

## 2021-12-10 PROCEDURE — 1036F TOBACCO NON-USER: CPT | Performed by: INTERNAL MEDICINE

## 2021-12-13 NOTE — PROGRESS NOTES
OFFICE VISIT        PRIMARY CARE PHYSICIAN:    No primary care provider on file. ALLERGIES / SENSITIVITIES:    Allergies   Allergen Reactions    Other      Pt states she cannot take any type of pain medication, she throws up and never stops          REVIEWED MEDICATIONS:      Current Outpatient Medications:     Multiple Vitamins-Minerals (MULTI COMPLETE PO), Take by mouth, Disp: , Rfl:     sevelamer (RENVELA) 800 MG tablet, Take 1 tablet by mouth 2 times daily (with meals) , Disp: , Rfl:     valproic acid (DEPAKENE) 250 MG capsule, Take 500 mg by mouth nightly, Disp: , Rfl:     nitroGLYCERIN (NITROSTAT) 0.4 MG SL tablet, Place 0.4 mg under the tongue every 5 minutes as needed for Chest pain up to max of 3 total doses. If no relief after 1 dose, call 911., Disp: , Rfl:     aspirin 81 MG tablet, Take 81 mg by mouth daily, Disp: , Rfl:     pantoprazole (PROTONIX) 40 MG tablet, Take 1 tablet by mouth daily. , Disp: 30 tablet, Rfl: 10    imipramine (TOFRANIL) 50 MG tablet, Take 50 mg by mouth nightly., Disp: , Rfl:     valproic acid (DEPAKENE) 250 MG capsule, Take 250 mg by mouth every morning , Disp: , Rfl:     calcium carbonate 600 MG TABS tablet, Take 1 tablet by mouth daily (Patient not taking: Reported on 12/10/2021), Disp: , Rfl:     b complex vitamins capsule, Take 1 capsule by mouth three times a week (Patient not taking: Reported on 12/10/2021), Disp: , Rfl:     eszopiclone (LUNESTA) 2 MG TABS, Take 2 mg by mouth nightly as needed. 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 History of recurrent syncopal episodes, but none since July of 2020. Tal Hutchinson is a pleasant, 28-year-old lady with cardiovascular history as described below.   She also suffers from end stage renal disease and is on hemodialysis 3 days a week. As mentioned above, she has had no recurrent syncopal episodes since July 2020. She denies chest pain or dyspnea with her daily activities. She denies orthopnea, PND's or lower extremity swelling. She denies palpitations. REVIEW OF SYSTEMS:     Constitutional: no fevers or chills or fatigue              HEENT: denies changes in hearing or vision, No difficulty swallowing              Endocrine: no weight changes              Musculoskeletal: no arthralgias or myalgias              Skin: denies rashes or itching or lesions              Heme/Lymph: denies bleeding              Heart: as above              Lungs: as above              GI: denies abdominal pain, vomiting, diarrhea or rectal bleeding or tarry stools              : denies hematuria, dysuria              Psych: denies mood changed, anxiety, depression.              Neuro: denies numbness, tingling, tremors.                    CARDIOVASCULAR HISTORY:      1. Hypertension. 2. Hyperlipidemia. 3. Coronary artery disease:  a. 12/21/2010 Cardiac catheterization: No significant epicardial coronary artery disease. Systemic hypertension. Concentric left ventricular hypertrophy with hyperdynamic left ventricular systolic function. Elevated left ventricular end diastolic pressure. Trace aortic regurgitation. b.  7/11/2014 Right and left heart catheterization:  Severe calcific aortic stenosis.  Systemic hypertension.  Patent coronary arteries.  Patent carotid arteries.  Normal right sided pressures.   c.  [facility] nuclear stress test done on 8/7/2019 showed no evidence of fixed or reversible perfusion abnormality with normal wall motion and a calculated ejection fraction of 77%.    4. Severe aortic stenosis.             a.  6/25/2014 Echocardiogram showed normal left ventricular size, wall motion and systolic                             function with Stage 1 left ventricular diastolic dysfunction.  Normal right ventricular size and               function. Moderate aortic valve sclerosis with severe aortic stenosis and mild aortic regurgitation.              b.  Aortic valve mean gradient of 47 mmHg with an aortic valve area of 0.65 sq cm on cardiac               catheterization on 7/11/2014.               c.  Aortic valve replacement with a 21 mm Medtronic Mosaic bioprosthesis tissue valve, 7/30/2014.                    d.  Echocardiogram done on 03/17/2016 showed normal left ventricular size with borderline left ventricular hypertrophy with an ejection fraction of 75% with stage 1 left ventricular diastolic dysfunction.  Normal right ventricular        size and function.  Mildly dilated left atrium.  Mild mitral annular calcification with mild to moderate mitral regurgitation.  Bioprosthesis in the aortic position, which is well seated, but the individual aortic leaflets were not well             visualized with a calculated mean gradient across the aortic prosthesis of 25.25 mmHg with no aortic regurgitation.  Sclerotic and calcified aortic root.          e.  Echocardiogram done on 3/12/2020 showed normal left ventricular size with borderline concentric left ventriclar hypertrophy with no regional wall motion abnormality with an estimated ejection fraction of 75-80% with stage 2 left ventricular diastolic dysfunction. Normal right ventricular size and function. Moderately dilated left atrium. Focal calcification of the mitral valve leaflets with moderate mitral annular calcification without mitral stenosis and with mild to moderate mitral regurgitation. Mild prosthetic aortic valve, which appeared well seated with a mean gradient of 28.33 mmHg with no aortic regurgitation. Mild to moderate tricuspid regurgitation.     5. Unroofing of anomalous right coronary artery at the time of the aortic valve surgery, 7/30/2014.    6. Paroxysmal atrial fibrillation.        a.  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    7. Carotid ultrasound done on 2014 was read by Dr. Teresita Swenson (Vascular surgery) as showing 30% stenosis on the right and no stenosis on the left. 8.  Syncopal episodes:  Multi-factorial.     PAST MEDICAL HISTORY:   1. As under cardiovascular history. 2. Anemia. 3. Chronic kidney disease (Stage 5) likely secondary to a hypertensive nephrosclerosis. a.  2008 Insertion of AV fistula, left arm.  b.  Secondary hyperparathyroidism. c.  Patient is on hemodialysis 3 days a week.          4.   Bipolar disorder. Psychosis. Anxiety. Depression. 5.   Gout. 6.  S/P partial hysterectomy (S/P 3 vaginal deliveries and 1 miscarriage). 7.  Left hip hemiarthroplasty on 2014 for left transcervical femoral neck displaced fracture, S/P fall        on 2014.    8. Hypercalcemia   9. 2015 screening colonoscopy and EGD with biopsy showed gastritis and mild sigmoid diverticulosis. 10. Hospitalized in 2018 with diarrhea and was diagnosed with colitis.        FAMILY HISTORY:   Mother: , 80, complications from Alzheimers disease. Father: , 68, colon cancer.      SOCIAL HISTORY:   Patient does not smoke. She does not drink alcohol.  since 2015        O:  COMPLETE PHYSICAL EXAM:      /82   Pulse 75   Resp 18   Ht 5' 3\" (1.6 m)   Wt 117 lb 9.6 oz (53.3 kg)   BMI 20.83 kg/m²       General:           Well-developed, well-nourished lady in no distress.              OLEL & Neck:   Atraumatic, normocephalic head. No JVD. No carotid bruits.  Carotid upstrokes noted bilaterally. No thyromegaly. Sclerae not icteric. No xanthelasmas.  Mucous membranes moist.              Chest:             Mid sternotomy scar well-healed. Symmetrical and nontender. No deformities.  Symmetrical and tender.              Lungs:             Clear to auscultation bilaterally.              Heart:              Normal S1 and S2.  No S3 or S4. Grade 2/6 murmur best heard at the right upper sternal border.                Abdomen:        Soft without organomegaly or masses, without organomegaly or masses.  Normal bowel sounds. No bruits.                Extremities:     No significant lower extremity edema. AV fistula left forearm with bruit and thrill.               Skin:                Normal turgor. No rashes or ulcers noted.                Neuro:             Oriented x 3.  No motor or sensory deficits detected.         REVIEW OF DIAGNOSTIC TESTS:    1. Blood tests from 12/8/2021 reviewed:  Hemoglobin 11.2, hematocrit 33.4. 2. EKG done today showed sinus rhythm and was within normal limits. ASSESSMENT / DIAGNOSIS:   1.  Anomalous coronary artery, S/P unroofing of the right coronary artery during open heart surgery, 7/30/2014.    2.  S/p aortic valve replacement with a tissue prosthesis, 7/30/2014 with elevated gradient across the tissue prosthesis on the most recent echocardiogram in 3/2020.    3.  Paroxysmal atrial fibrillation, S/P modified Maze procedure and bipolar radiofrequency ablation procedure, 7/30/2014, patient is in sinus rhythm.    4.  End stage renal disease on hemodialysis through left wrist AV fistula, Tuesday's, Thursday's and Saturday's.    5.  Secondary hyperparathyroidism.    6.  Anemia of chronic disease. 7.  Hypertension: Adequately controlled.    8.  History of hyperlipidemia.    9.  Bipolar disorder. 10.  History of gout.    11.  Mild to moderate mitral regurgitation on echocardiogram in 3/2020.    12. History of syncopal episodes:  Multi-factorial.  No recurrence since 7/2020. TREATMENT PLAN:  1. Reassure. 2.  Echocardiogram for follow up valvular heart disease, S/P AVR.    3.  Follow up with Cardiology in 1 year or on a prn basis pending the results of the echocardiogram.       [place]  [facility] [address] [id number] [place] ([place]).  [facility] [address]  [phone]

## 2022-03-14 ENCOUNTER — APPOINTMENT (OUTPATIENT)
Dept: GENERAL RADIOLOGY | Age: 82
End: 2022-03-14
Payer: MEDICARE

## 2022-03-14 ENCOUNTER — APPOINTMENT (OUTPATIENT)
Dept: CT IMAGING | Age: 82
End: 2022-03-14
Payer: MEDICARE

## 2022-03-14 ENCOUNTER — HOSPITAL ENCOUNTER (EMERGENCY)
Age: 82
Discharge: HOME | End: 2022-03-14
Attending: EMERGENCY MEDICINE
Payer: MEDICARE

## 2022-03-14 VITALS
RESPIRATION RATE: 16 BRPM | DIASTOLIC BLOOD PRESSURE: 51 MMHG | TEMPERATURE: 98.2 F | SYSTOLIC BLOOD PRESSURE: 145 MMHG | HEART RATE: 64 BPM | OXYGEN SATURATION: 100 %

## 2022-03-14 DIAGNOSIS — R42 DIZZINESS: Primary | ICD-10-CM

## 2022-03-14 LAB
ALBUMIN SERPL-MCNC: 3.9 G/DL (ref 3.5–5.2)
ALP BLD-CCNC: 86 U/L (ref 35–104)
ALT SERPL-CCNC: 6 U/L (ref 0–32)
ANION GAP SERPL CALCULATED.3IONS-SCNC: 21 MMOL/L (ref 7–16)
AST SERPL-CCNC: 22 U/L (ref 0–31)
BACTERIA: ABNORMAL /HPF
BASOPHILS ABSOLUTE: 0.03 E9/L (ref 0–0.2)
BASOPHILS RELATIVE PERCENT: 0.4 % (ref 0–2)
BILIRUB SERPL-MCNC: 0.3 MG/DL (ref 0–1.2)
BILIRUBIN URINE: NEGATIVE
BLOOD, URINE: NEGATIVE
BUN BLDV-MCNC: 83 MG/DL (ref 6–23)
CALCIUM SERPL-MCNC: 9.5 MG/DL (ref 8.6–10.2)
CHLORIDE BLD-SCNC: 99 MMOL/L (ref 98–107)
CLARITY: CLEAR
CO2: 20 MMOL/L (ref 22–29)
COLOR: YELLOW
CREAT SERPL-MCNC: 7.5 MG/DL (ref 0.5–1)
EKG ATRIAL RATE: 68 BPM
EKG P AXIS: 64 DEGREES
EKG P-R INTERVAL: 160 MS
EKG Q-T INTERVAL: 414 MS
EKG QRS DURATION: 86 MS
EKG QTC CALCULATION (BAZETT): 440 MS
EKG R AXIS: 63 DEGREES
EKG T AXIS: 83 DEGREES
EKG VENTRICULAR RATE: 68 BPM
EOSINOPHILS ABSOLUTE: 0.12 E9/L (ref 0.05–0.5)
EOSINOPHILS RELATIVE PERCENT: 1.7 % (ref 0–6)
GFR AFRICAN AMERICAN: 6
GFR NON-AFRICAN AMERICAN: 5 ML/MIN/1.73
GLUCOSE BLD-MCNC: 99 MG/DL (ref 74–99)
GLUCOSE URINE: NEGATIVE MG/DL
HCT VFR BLD CALC: 36 % (ref 34–48)
HEMOGLOBIN: 11.7 G/DL (ref 11.5–15.5)
IMMATURE GRANULOCYTES #: 0.04 E9/L
IMMATURE GRANULOCYTES %: 0.6 % (ref 0–5)
KETONES, URINE: NEGATIVE MG/DL
LEUKOCYTE ESTERASE, URINE: ABNORMAL
LYMPHOCYTES ABSOLUTE: 1.41 E9/L (ref 1.5–4)
LYMPHOCYTES RELATIVE PERCENT: 19.5 % (ref 20–42)
MCH RBC QN AUTO: 32.4 PG (ref 26–35)
MCHC RBC AUTO-ENTMCNC: 32.5 % (ref 32–34.5)
MCV RBC AUTO: 99.7 FL (ref 80–99.9)
MONOCYTES ABSOLUTE: 0.64 E9/L (ref 0.1–0.95)
MONOCYTES RELATIVE PERCENT: 8.8 % (ref 2–12)
NEUTROPHILS ABSOLUTE: 5 E9/L (ref 1.8–7.3)
NEUTROPHILS RELATIVE PERCENT: 69 % (ref 43–80)
NITRITE, URINE: NEGATIVE
PDW BLD-RTO: 13.7 FL (ref 11.5–15)
PH UA: 8 (ref 5–9)
PLATELET # BLD: 199 E9/L (ref 130–450)
PMV BLD AUTO: 10.7 FL (ref 7–12)
POTASSIUM REFLEX MAGNESIUM: 5.2 MMOL/L (ref 3.5–5)
PROTEIN UA: 30 MG/DL
RBC # BLD: 3.61 E12/L (ref 3.5–5.5)
RBC UA: ABNORMAL /HPF (ref 0–2)
REASON FOR REJECTION: NORMAL
REJECTED TEST: NORMAL
SODIUM BLD-SCNC: 140 MMOL/L (ref 132–146)
SPECIFIC GRAVITY UA: <=1.005 (ref 1–1.03)
TOTAL PROTEIN: 6.8 G/DL (ref 6.4–8.3)
TROPONIN, HIGH SENSITIVITY: 66 NG/L (ref 0–9)
TROPONIN, HIGH SENSITIVITY: 69 NG/L (ref 0–9)
UROBILINOGEN, URINE: 0.2 E.U./DL
WBC # BLD: 7.2 E9/L (ref 4.5–11.5)
WBC UA: ABNORMAL /HPF (ref 0–5)

## 2022-03-14 PROCEDURE — 85025 COMPLETE CBC W/AUTO DIFF WBC: CPT

## 2022-03-14 PROCEDURE — 93005 ELECTROCARDIOGRAM TRACING: CPT | Performed by: NURSE PRACTITIONER

## 2022-03-14 PROCEDURE — 80053 COMPREHEN METABOLIC PANEL: CPT

## 2022-03-14 PROCEDURE — 81001 URINALYSIS AUTO W/SCOPE: CPT

## 2022-03-14 PROCEDURE — 6370000000 HC RX 637 (ALT 250 FOR IP): Performed by: EMERGENCY MEDICINE

## 2022-03-14 PROCEDURE — 70450 CT HEAD/BRAIN W/O DYE: CPT

## 2022-03-14 PROCEDURE — 36415 COLL VENOUS BLD VENIPUNCTURE: CPT

## 2022-03-14 PROCEDURE — 71046 X-RAY EXAM CHEST 2 VIEWS: CPT

## 2022-03-14 PROCEDURE — 93010 ELECTROCARDIOGRAM REPORT: CPT | Performed by: INTERNAL MEDICINE

## 2022-03-14 PROCEDURE — 84484 ASSAY OF TROPONIN QUANT: CPT

## 2022-03-14 PROCEDURE — 99283 EMERGENCY DEPT VISIT LOW MDM: CPT

## 2022-03-14 RX ORDER — MECLIZINE HCL 12.5 MG/1
12.5 TABLET ORAL ONCE
Status: COMPLETED | OUTPATIENT
Start: 2022-03-14 | End: 2022-03-14

## 2022-03-14 RX ORDER — ACETAMINOPHEN 325 MG/1
650 TABLET ORAL ONCE
Status: COMPLETED | OUTPATIENT
Start: 2022-03-14 | End: 2022-03-14

## 2022-03-14 RX ORDER — MECLIZINE HYDROCHLORIDE 25 MG/1
25 TABLET ORAL 3 TIMES DAILY PRN
Qty: 21 TABLET | Refills: 0 | Status: SHIPPED | OUTPATIENT
Start: 2022-03-14 | End: 2022-03-21

## 2022-03-14 RX ORDER — ONDANSETRON 4 MG/1
4 TABLET, ORALLY DISINTEGRATING ORAL ONCE
Status: COMPLETED | OUTPATIENT
Start: 2022-03-14 | End: 2022-03-14

## 2022-03-14 RX ADMIN — ACETAMINOPHEN 650 MG: 325 TABLET ORAL at 18:54

## 2022-03-14 RX ADMIN — MECLIZINE 12.5 MG: 12.5 TABLET ORAL at 18:54

## 2022-03-14 RX ADMIN — ONDANSETRON 4 MG: 4 TABLET, ORALLY DISINTEGRATING ORAL at 18:54

## 2022-03-14 ASSESSMENT — PAIN SCALES - GENERAL: PAINLEVEL_OUTOF10: 7

## 2022-03-14 NOTE — ED PROVIDER NOTES
HPI:  3/14/22,   Time: 6:43 PM EDT       All Stokes is a 80 y.o. female presenting to the ED for dizziness, beginning 1 day ago. The complaint has been intermittent, moderate in severity, and worsened by changing position, movement of head. Better when rests or lays down, hx same in past.  Pos ha. No fever/chills/sweats. Feels like room is spinning. Pt esrd, missed dialysis today, but didn't miss last Friday. No cp/sob/abd pain. Bib private vehicle. No focal weakness or tingling. Has emesis when feels dizzy    Review of Systems:   Pertinent positives and negatives are stated within HPI, all other systems reviewed and are negative.          --------------------------------------------- PAST HISTORY ---------------------------------------------  Past Medical History:  has a past medical history of Anemia, Aortic stenosis, severe, Arthritis, Bipolar disorder ([address] CAD (coronary artery disease), Carotid bruit, Chronic kidney disease, CRF (chronic renal failure), Depression, Gout, Hemodialysis patient ([address] Hyperlipidemia, Hypertension, Left displaced femoral neck fracture ([address] Murmur, heart, Psychosis ([address] Secondary hyperparathyroidism ([address] and Shingles. Past Surgical History:  has a past surgical history that includes AV fistula repair (4-30-08); Dialysis fistula creation (1-8-08); Hysterectomy; Diagnostic Cardiac Cath Lab Procedure (12/21/2010); transthoracic echocardiogram (6/19/2012); Colonoscopy (3/15); Aortic valve replacement (8/5/14); fracture surgery (9/8/14); and Upper gastrointestinal endoscopy (3/15). Social History:  reports that she has never smoked. She has never used smokeless tobacco. She reports previous alcohol use. She reports that she does not use drugs. Family History: family history is not on file. The patients home medications have been reviewed. Allergies:  Other        ---------------------------------------------------PHYSICAL EXAM--------------------------------------    Constitutional/General: Alert and oriented x3, well appearing, non toxic in NAD  Head: Normocephalic and atraumatic  Eyes: PERRL, EOMI, conjunctive normal, sclera non icteric  Mouth: Oropharynx clear, handling secretions,   Neck: Supple, full ROM,   Respiratory: Lungs clear to auscultation bilaterally, no wheezes, rales, or rhonchi. Not in respiratory distress  Cardiovascular:  Regular rate. Regular rhythm. No murmurs, gallops, or rubs. 2+ distal pulses  Chest: No chest wall tenderness  GI:  Abdomen Soft, Non tender, Non distended. +BS. No organomegaly, no palpable masses,  No rebound, guarding, or rigidity. Musculoskeletal: Moves all extremities x 4. Warm and well perfused, no clubbing, cyanosis, or edema. Capillary refill <3 seconds  Integument: skin warm and dry. No rashes. Lymphatic: no lymphadenopathy noted  Neurologic: GCS 15, no focal deficits, symmetric strength 5/5 in the upper and lower extremities bilaterally, sensory exam nml, cn2-12 intact, cerebellar exam nml with ftn/christi/and heal to shin  Psychiatric: Normal Affect    -------------------------------------------------- RESULTS -------------------------------------------------  I have personally reviewed all laboratory and imaging results for this patient. Results are listed below.      LABS:  Results for orders placed or performed during the hospital encounter of 03/14/22   CBC with Auto Differential   Result Value Ref Range    WBC 7.2 4.5 - 11.5 E9/L    RBC 3.61 3.50 - 5.50 E12/L    Hemoglobin 11.7 11.5 - 15.5 g/dL    Hematocrit 36.0 34.0 - 48.0 %    MCV 99.7 80.0 - 99.9 fL    MCH 32.4 26.0 - 35.0 pg    MCHC 32.5 32.0 - 34.5 %    RDW 13.7 11.5 - 15.0 fL    Platelets 832 181 - 775 E9/L    MPV 10.7 7.0 - 12.0 fL    Neutrophils % 69.0 43.0 - 80.0 %    Immature Granulocytes % 0.6 0.0 - 5.0 %    Lymphocytes % 19.5 (L) 20.0 - 42.0 %    Monocytes % 8.8 2.0 - 12.0 %    Eosinophils % 1.7 0.0 - 6.0 % Basophils % 0.4 0.0 - 2.0 %    Neutrophils Absolute 5.00 1.80 - 7.30 E9/L    Immature Granulocytes # 0.04 E9/L    Lymphocytes Absolute 1.41 (L) 1.50 - 4.00 E9/L    Monocytes Absolute 0.64 0.10 - 0.95 E9/L    Eosinophils Absolute 0.12 0.05 - 0.50 E9/L    Basophils Absolute 0.03 0.00 - 0.20 E9/L   Comprehensive Metabolic Panel w/ Reflex to MG   Result Value Ref Range    Sodium 140 132 - 146 mmol/L    Potassium reflex Magnesium 5.2 (H) 3.5 - 5.0 mmol/L    Chloride 99 98 - 107 mmol/L    CO2 20 (L) 22 - 29 mmol/L    Anion Gap 21 (H) 7 - 16 mmol/L    Glucose 99 74 - 99 mg/dL    BUN 83 (H) 6 - 23 mg/dL    CREATININE 7.5 (H) 0.5 - 1.0 mg/dL    GFR Non-African American 5 >=60 mL/min/1.73    GFR African American 6     Calcium 9.5 8.6 - 10.2 mg/dL    Total Protein 6.8 6.4 - 8.3 g/dL    Albumin 3.9 3.5 - 5.2 g/dL    Total Bilirubin 0.3 0.0 - 1.2 mg/dL    Alkaline Phosphatase 86 35 - 104 U/L    ALT 6 0 - 32 U/L    AST 22 0 - 31 U/L   Urinalysis with Microscopic   Result Value Ref Range    Color, UA Yellow Straw/Yellow    Clarity, UA Clear Clear    Glucose, Ur Negative Negative mg/dL    Bilirubin Urine Negative Negative    Ketones, Urine Negative Negative mg/dL    Specific Gravity, UA <=1.005 1.005 - 1.030    Blood, Urine Negative Negative    pH, UA 8.0 5.0 - 9.0    Protein, UA 30 (A) Negative mg/dL    Urobilinogen, Urine 0.2 <2.0 E.U./dL    Nitrite, Urine Negative Negative    Leukocyte Esterase, Urine SMALL (A) Negative    WBC, UA 2-5 0 - 5 /HPF    RBC, UA NONE 0 - 2 /HPF    Bacteria, UA NONE SEEN None Seen /HPF   SPECIMEN REJECTION   Result Value Ref Range    Rejected Test trop     Reason for Rejection see below    Troponin   Result Value Ref Range    Troponin, High Sensitivity 69 (H) 0 - 9 ng/L   Troponin   Result Value Ref Range    Troponin, High Sensitivity 66 (H) 0 - 9 ng/L   EKG 12 Lead   Result Value Ref Range    Ventricular Rate 68 BPM    Atrial Rate 68 BPM    P-R Interval 160 ms    QRS Duration 86 ms    Q-T Interval 414 ms    QTc Calculation (Bazett) 440 ms    P Axis 64 degrees    R Axis 63 degrees    T Axis 83 degrees       RADIOLOGY:  Interpreted by Radiologist.  CT Head WO Contrast   Final Result   1. No acute intracranial abnormality. 2. Small, 4 mm chronic lacunar infarction in the right cerebellar hemisphere. RECOMMENDATIONS:   Unavailable         XR CHEST (2 VW)   Final Result   No acute process. EKG:  This EKG is signed and interpreted by the EP. Time: 1235  Rate: 70  Rhythm: Sinus  Interpretation: non-specific EKG  Comparison: None      ------------------------- NURSING NOTES AND VITALS REVIEWED ---------------------------   The nursing notes within the ED encounter and vital signs as below have been reviewed by myself. BP (!) 145/51   Pulse 64   Temp 98.2 °F (36.8 °C)   Resp 16   SpO2 100%   Oxygen Saturation Interpretation: Normal    The patients available past medical records and past encounters were reviewed. ------------------------------ ED COURSE/MEDICAL DECISION MAKING----------------------  Medications   meclizine (ANTIVERT) tablet 12.5 mg (12.5 mg Oral Given 3/14/22 1854)   ondansetron (ZOFRAN-ODT) disintegrating tablet 4 mg (4 mg Oral Given 3/14/22 1854)   acetaminophen (TYLENOL) tablet 650 mg (650 mg Oral Given 3/14/22 1854)         ED COURSE:       Medical Decision Making:    Sx intermittent, positional, better with antivert, vss, w/u unremarkable,. Not cns by hx or exam, nml neuro exam in ed, [place] home      This patient's ED course included: a personal history and physicial examination    This patient has remained hemodynamically stable during their ED course. Re-Evaluations:             Re-evaluation.   Patients symptoms are improving    Re-examination  3/14/22   10 PM EDT          Vital Signs:   Vitals:    03/14/22 1145 03/14/22 1220 03/14/22 2000   BP:  (!) 163/71 (!) 145/51   Pulse: 73  64   Resp:  16 16   Temp: 98.2 °F (36.8 °C)     SpO2: 98%  100% aaox4  nad  Neuro nml  strenght 5/5 el  nml sensory  cn2-12 intact  nml cerebellar      Consultations:                 Critical Care:         Counseling: The emergency provider has spoken with the patient and discussed todays results, in addition to providing specific details for the plan of care and counseling regarding the diagnosis and prognosis. Questions are answered at this time and they are agreeable with the plan.       --------------------------------- IMPRESSION AND DISPOSITION ---------------------------------    IMPRESSION  1. Dizziness        DISPOSITION  Disposition: Discharge to home  Patient condition is stable    NOTE: This report was transcribed using voice recognition software.  Every effort was made to ensure accuracy; however, inadvertent computerized transcription errors may be present        Britni Soto MD  03/14/22 [id number]

## 2022-03-14 NOTE — ED NOTES
Attempted to room patient. Patient not in waiting room at this time.       [address], RN  03/14/22 0054

## 2022-03-14 NOTE — ED NOTES
Department of Emergency Medicine  FIRST PROVIDER TRIAGE NOTE             Independent MLP           3/14/22  12:25 PM EDT    Date of Encounter: 3/14/22   MRN: [id number]      HPI: Patricia Verdin is a 80 y.o. female who presents to the ED for Dizziness (started friday night, worse today, feels like room is spinning )     States on Friday she started feeling dizziness. Any syncopal episode. She denies any falls or head strike    ROS: Negative for cp or sob. PE: Gen Appearance/Constitutional: alert  CV: regular rate     Initial Plan of Care: All treatment areas with department are currently occupied. Plan to order/Initiate the following while awaiting opening in ED: labs, EKG and imaging studies.   Initiate Treatment-Testing, Proceed toTreatment Area When Bed Available for ED Attending/SHREEP to Continue Care    Electronically signed by Doll Duane, APRN - CNP   DD: 3/14/22         Doll Duane, APRN - CNP  03/14/22 0756

## 2022-08-27 ENCOUNTER — APPOINTMENT (OUTPATIENT)
Dept: CT IMAGING | Age: 82
End: 2022-08-27
Payer: MEDICARE

## 2022-08-27 ENCOUNTER — HOSPITAL ENCOUNTER (OUTPATIENT)
Age: 82
Setting detail: OBSERVATION
Discharge: HOME | End: 2022-08-28
Attending: EMERGENCY MEDICINE | Admitting: INTERNAL MEDICINE
Payer: MEDICARE

## 2022-08-27 DIAGNOSIS — R19.7 DIARRHEA, UNSPECIFIED TYPE: ICD-10-CM

## 2022-08-27 DIAGNOSIS — R42 LIGHTHEADED: ICD-10-CM

## 2022-08-27 DIAGNOSIS — N18.6 ESRD ON HEMODIALYSIS (HCC): Primary | ICD-10-CM

## 2022-08-27 DIAGNOSIS — Z99.2 ESRD ON HEMODIALYSIS (HCC): Primary | ICD-10-CM

## 2022-08-27 LAB
ALBUMIN SERPL-MCNC: 4 G/DL (ref 3.5–5.2)
ALP BLD-CCNC: 93 U/L (ref 35–104)
ALT SERPL-CCNC: 5 U/L (ref 0–32)
ANION GAP SERPL CALCULATED.3IONS-SCNC: 16 MMOL/L (ref 7–16)
AST SERPL-CCNC: 16 U/L (ref 0–31)
BASOPHILS ABSOLUTE: 0.02 E9/L (ref 0–0.2)
BASOPHILS RELATIVE PERCENT: 0.2 % (ref 0–2)
BILIRUB SERPL-MCNC: 0.3 MG/DL (ref 0–1.2)
BUN BLDV-MCNC: 36 MG/DL (ref 6–23)
CALCIUM SERPL-MCNC: 9.6 MG/DL (ref 8.6–10.2)
CHLORIDE BLD-SCNC: 92 MMOL/L (ref 98–107)
CO2: 29 MMOL/L (ref 22–29)
CREAT SERPL-MCNC: 4.8 MG/DL (ref 0.5–1)
EOSINOPHILS ABSOLUTE: 0.03 E9/L (ref 0.05–0.5)
EOSINOPHILS RELATIVE PERCENT: 0.2 % (ref 0–6)
GFR AFRICAN AMERICAN: 11
GFR NON-AFRICAN AMERICAN: 9 ML/MIN/1.73
GLUCOSE BLD-MCNC: 94 MG/DL (ref 74–99)
HCT VFR BLD CALC: 38.8 % (ref 34–48)
HEMOGLOBIN: 12.6 G/DL (ref 11.5–15.5)
IMMATURE GRANULOCYTES #: 0.05 E9/L
IMMATURE GRANULOCYTES %: 0.4 % (ref 0–5)
LIPASE: 102 U/L (ref 13–60)
LYMPHOCYTES ABSOLUTE: 1.62 E9/L (ref 1.5–4)
LYMPHOCYTES RELATIVE PERCENT: 12.9 % (ref 20–42)
MCH RBC QN AUTO: 32.9 PG (ref 26–35)
MCHC RBC AUTO-ENTMCNC: 32.5 % (ref 32–34.5)
MCV RBC AUTO: 101.3 FL (ref 80–99.9)
MONOCYTES ABSOLUTE: 1.31 E9/L (ref 0.1–0.95)
MONOCYTES RELATIVE PERCENT: 10.4 % (ref 2–12)
NEUTROPHILS ABSOLUTE: 9.52 E9/L (ref 1.8–7.3)
NEUTROPHILS RELATIVE PERCENT: 75.9 % (ref 43–80)
PDW BLD-RTO: 14.2 FL (ref 11.5–15)
PLATELET # BLD: 206 E9/L (ref 130–450)
PMV BLD AUTO: 10.5 FL (ref 7–12)
POTASSIUM SERPL-SCNC: 3.8 MMOL/L (ref 3.5–5)
PRO-BNP: 9696 PG/ML (ref 0–450)
RBC # BLD: 3.83 E12/L (ref 3.5–5.5)
SARS-COV-2, NAAT: NOT DETECTED
SODIUM BLD-SCNC: 137 MMOL/L (ref 132–146)
TOTAL PROTEIN: 6.8 G/DL (ref 6.4–8.3)
TROPONIN, HIGH SENSITIVITY: 79 NG/L (ref 0–9)
TROPONIN, HIGH SENSITIVITY: 84 NG/L (ref 0–9)
WBC # BLD: 12.6 E9/L (ref 4.5–11.5)

## 2022-08-27 PROCEDURE — 36415 COLL VENOUS BLD VENIPUNCTURE: CPT

## 2022-08-27 PROCEDURE — 2500000003 HC RX 250 WO HCPCS: Performed by: EMERGENCY MEDICINE

## 2022-08-27 PROCEDURE — 83880 ASSAY OF NATRIURETIC PEPTIDE: CPT

## 2022-08-27 PROCEDURE — G0378 HOSPITAL OBSERVATION PER HR: HCPCS

## 2022-08-27 PROCEDURE — 99285 EMERGENCY DEPT VISIT HI MDM: CPT

## 2022-08-27 PROCEDURE — 6370000000 HC RX 637 (ALT 250 FOR IP): Performed by: EMERGENCY MEDICINE

## 2022-08-27 PROCEDURE — 87040 BLOOD CULTURE FOR BACTERIA: CPT

## 2022-08-27 PROCEDURE — 6360000004 HC RX CONTRAST MEDICATION: Performed by: RADIOLOGY

## 2022-08-27 PROCEDURE — 96361 HYDRATE IV INFUSION ADD-ON: CPT

## 2022-08-27 PROCEDURE — 96375 TX/PRO/DX INJ NEW DRUG ADDON: CPT

## 2022-08-27 PROCEDURE — 87635 SARS-COV-2 COVID-19 AMP PRB: CPT

## 2022-08-27 PROCEDURE — 6360000002 HC RX W HCPCS: Performed by: EMERGENCY MEDICINE

## 2022-08-27 PROCEDURE — 6370000000 HC RX 637 (ALT 250 FOR IP): Performed by: INTERNAL MEDICINE

## 2022-08-27 PROCEDURE — 2580000003 HC RX 258: Performed by: INTERNAL MEDICINE

## 2022-08-27 PROCEDURE — 80053 COMPREHEN METABOLIC PANEL: CPT

## 2022-08-27 PROCEDURE — 2580000003 HC RX 258

## 2022-08-27 PROCEDURE — 96372 THER/PROPH/DIAG INJ SC/IM: CPT

## 2022-08-27 PROCEDURE — 83690 ASSAY OF LIPASE: CPT

## 2022-08-27 PROCEDURE — 74177 CT ABD & PELVIS W/CONTRAST: CPT

## 2022-08-27 PROCEDURE — 90935 HEMODIALYSIS ONE EVALUATION: CPT

## 2022-08-27 PROCEDURE — 84484 ASSAY OF TROPONIN QUANT: CPT

## 2022-08-27 PROCEDURE — 96374 THER/PROPH/DIAG INJ IV PUSH: CPT

## 2022-08-27 PROCEDURE — 6360000002 HC RX W HCPCS: Performed by: INTERNAL MEDICINE

## 2022-08-27 PROCEDURE — A4216 STERILE WATER/SALINE, 10 ML: HCPCS | Performed by: EMERGENCY MEDICINE

## 2022-08-27 PROCEDURE — 2580000003 HC RX 258: Performed by: EMERGENCY MEDICINE

## 2022-08-27 PROCEDURE — 85025 COMPLETE CBC W/AUTO DIFF WBC: CPT

## 2022-08-27 RX ORDER — SODIUM CHLORIDE 0.9 % (FLUSH) 0.9 %
5-40 SYRINGE (ML) INJECTION PRN
Status: DISCONTINUED | OUTPATIENT
Start: 2022-08-27 | End: 2022-08-28 | Stop reason: HOSPADM

## 2022-08-27 RX ORDER — SEVELAMER CARBONATE 800 MG/1
800 TABLET, FILM COATED ORAL 2 TIMES DAILY WITH MEALS
Status: DISCONTINUED | OUTPATIENT
Start: 2022-08-27 | End: 2022-08-28 | Stop reason: HOSPADM

## 2022-08-27 RX ORDER — ACETAMINOPHEN 325 MG/1
650 TABLET ORAL EVERY 6 HOURS PRN
Status: DISCONTINUED | OUTPATIENT
Start: 2022-08-27 | End: 2022-08-28 | Stop reason: HOSPADM

## 2022-08-27 RX ORDER — PANTOPRAZOLE SODIUM 40 MG/1
40 TABLET, DELAYED RELEASE ORAL DAILY
Status: DISCONTINUED | OUTPATIENT
Start: 2022-08-27 | End: 2022-08-28 | Stop reason: HOSPADM

## 2022-08-27 RX ORDER — POLYETHYLENE GLYCOL 3350 17 G/17G
17 POWDER, FOR SOLUTION ORAL DAILY PRN
Status: DISCONTINUED | OUTPATIENT
Start: 2022-08-27 | End: 2022-08-28 | Stop reason: HOSPADM

## 2022-08-27 RX ORDER — 0.9 % SODIUM CHLORIDE 0.9 %
500 INTRAVENOUS SOLUTION INTRAVENOUS ONCE
Status: COMPLETED | OUTPATIENT
Start: 2022-08-27 | End: 2022-08-27

## 2022-08-27 RX ORDER — ONDANSETRON 4 MG/1
4 TABLET, ORALLY DISINTEGRATING ORAL EVERY 8 HOURS PRN
Status: DISCONTINUED | OUTPATIENT
Start: 2022-08-27 | End: 2022-08-28 | Stop reason: HOSPADM

## 2022-08-27 RX ORDER — ONDANSETRON 2 MG/ML
4 INJECTION INTRAMUSCULAR; INTRAVENOUS ONCE
Status: COMPLETED | OUTPATIENT
Start: 2022-08-27 | End: 2022-08-27

## 2022-08-27 RX ORDER — SODIUM CHLORIDE 9 MG/ML
INJECTION, SOLUTION INTRAVENOUS
Status: COMPLETED
Start: 2022-08-27 | End: 2022-08-27

## 2022-08-27 RX ORDER — SODIUM CHLORIDE 9 MG/ML
INJECTION, SOLUTION INTRAVENOUS PRN
Status: DISCONTINUED | OUTPATIENT
Start: 2022-08-27 | End: 2022-08-28 | Stop reason: HOSPADM

## 2022-08-27 RX ORDER — ACETAMINOPHEN 650 MG/1
650 SUPPOSITORY RECTAL EVERY 6 HOURS PRN
Status: DISCONTINUED | OUTPATIENT
Start: 2022-08-27 | End: 2022-08-28 | Stop reason: HOSPADM

## 2022-08-27 RX ORDER — DEXTROSE MONOHYDRATE 100 MG/ML
INJECTION, SOLUTION INTRAVENOUS CONTINUOUS PRN
Status: DISCONTINUED | OUTPATIENT
Start: 2022-08-27 | End: 2022-08-28 | Stop reason: HOSPADM

## 2022-08-27 RX ORDER — HEPARIN SODIUM 5000 [USP'U]/ML
5000 INJECTION, SOLUTION INTRAVENOUS; SUBCUTANEOUS EVERY 8 HOURS SCHEDULED
Status: DISCONTINUED | OUTPATIENT
Start: 2022-08-27 | End: 2022-08-28 | Stop reason: HOSPADM

## 2022-08-27 RX ORDER — VALPROIC ACID 250 MG/1
500 CAPSULE, LIQUID FILLED ORAL NIGHTLY
Status: DISCONTINUED | OUTPATIENT
Start: 2022-08-27 | End: 2022-08-28 | Stop reason: HOSPADM

## 2022-08-27 RX ORDER — VALPROIC ACID 250 MG/1
250 CAPSULE, LIQUID FILLED ORAL EVERY MORNING
Status: DISCONTINUED | OUTPATIENT
Start: 2022-08-28 | End: 2022-08-28 | Stop reason: HOSPADM

## 2022-08-27 RX ORDER — ASPIRIN 81 MG/1
81 TABLET ORAL DAILY
Status: DISCONTINUED | OUTPATIENT
Start: 2022-08-27 | End: 2022-08-28 | Stop reason: HOSPADM

## 2022-08-27 RX ORDER — SODIUM CHLORIDE 0.9 % (FLUSH) 0.9 %
5-40 SYRINGE (ML) INJECTION EVERY 12 HOURS SCHEDULED
Status: DISCONTINUED | OUTPATIENT
Start: 2022-08-27 | End: 2022-08-28 | Stop reason: HOSPADM

## 2022-08-27 RX ADMIN — IOPAMIDOL 50 ML: 755 INJECTION, SOLUTION INTRAVENOUS at 14:13

## 2022-08-27 RX ADMIN — HEPARIN SODIUM 5000 UNITS: 5000 INJECTION INTRAVENOUS; SUBCUTANEOUS at 22:22

## 2022-08-27 RX ADMIN — ONDANSETRON 4 MG: 4 TABLET, ORALLY DISINTEGRATING ORAL at 21:22

## 2022-08-27 RX ADMIN — FAMOTIDINE 20 MG: 10 INJECTION, SOLUTION INTRAVENOUS at 11:36

## 2022-08-27 RX ADMIN — ONDANSETRON 4 MG: 2 INJECTION INTRAMUSCULAR; INTRAVENOUS at 11:31

## 2022-08-27 RX ADMIN — LIDOCAINE HYDROCHLORIDE: 20 SOLUTION ORAL; TOPICAL at 11:41

## 2022-08-27 RX ADMIN — VALPROIC ACID 500 MG: 250 CAPSULE, LIQUID FILLED ORAL at 22:22

## 2022-08-27 RX ADMIN — Medication 10 ML: at 22:09

## 2022-08-27 RX ADMIN — SODIUM CHLORIDE 500 ML: 9 INJECTION, SOLUTION INTRAVENOUS at 16:00

## 2022-08-27 RX ADMIN — Medication 500 ML: at 16:00

## 2022-08-27 ASSESSMENT — ENCOUNTER SYMPTOMS
EYE PAIN: 0
EYE REDNESS: 0
DIARRHEA: 1
CHEST TIGHTNESS: 0
ABDOMINAL PAIN: 1
SHORTNESS OF BREATH: 0
VOMITING: 0
CONSTIPATION: 0
NAUSEA: 0

## 2022-08-27 ASSESSMENT — PAIN DESCRIPTION - LOCATION: LOCATION: ABDOMEN

## 2022-08-27 ASSESSMENT — PAIN - FUNCTIONAL ASSESSMENT: PAIN_FUNCTIONAL_ASSESSMENT: 0-10

## 2022-08-27 ASSESSMENT — PAIN SCALES - GENERAL
PAINLEVEL_OUTOF10: 0
PAINLEVEL_OUTOF10: 5

## 2022-08-27 NOTE — ED NOTES
Report called and given to emely. All questions answered at this time.       Kristofer Anaya RN  08/27/22 5810

## 2022-08-27 NOTE — H&P
Department of Internal Medicine  History and Physical    PCP: No primary care provider on file. Admitting Physician: Dr. Fritz Lopez  Consultants:   Date of Service: 8/27/2022    CHIEF COMPLAINT:  dialysis need    HISTORY OF PRESENT ILLNESS:    Patient is 82-year-old female presented to the ED with malaise. Patient has been experiencing diarrhea as well as nausea for the last day. She complains of dry heaves. Her appetite has diminished significantly. She admits to subjective fever and chills. She has chronic abdominal pain which has not changed. Patient was given IV contrast in the ED for CT abdomen and pelvis. She was in need of dialysis as such patient was admitted to the hospital after consultation with nephrology. PAST MEDICAL Hx:  Past Medical History:   Diagnosis Date    Anemia     Aortic stenosis, severe 6/25/14    Arthritis     Bipolar disorder (HCC)     Psychosis, anxiety, depression. CAD (coronary artery disease)     Carotid bruit 10/11/2012    Chronic kidney disease     CRF (chronic renal failure) 4/10/2012    Depression     Gout     Hemodialysis patient ([address]     kiki alanis sat    Hyperlipidemia     Hypertension     Left displaced femoral neck fracture ([address] 10/13/2014    Murmur, heart     Psychosis ([address]     Secondary hyperparathyroidism ([address]     Shingles        PAST SURGICAL Hx:   Past Surgical History:   Procedure Laterality Date    AORTIC VALVE REPLACEMENT  8/5/14    Dr Raissa Guidry  4-30-08    Rev AVF wtih vein patch    COLONOSCOPY  3/15    DIAGNOSTIC CARDIAC CATH LAB PROCEDURE  12/21/2010    No significant epicardial CAD. Systemic HTN. Concentric left ventricular hypertrophy with hyperdynamic left ventricular systolic function. Elevated left ventricular end diastolic pressure. Trace aortic regurgitation.       DIALYSIS FISTULA CREATION  1-8-08    L AVF Madeline Jara)    FRACTURE SURGERY  9/8/14    left hip hemiarthroplasty    HYSTERECTOMY      Partial (S/P 3 vaginal deliveries and 1 miscarriage). TRANSTHORACIC ECHOCARDIOGRAM  6/19/2012    Normal LV size, wall thickness, wall motion and systolic function with stage 1 left ventricular diastolic dysfunction, normal right ventricular size and function, moderate aortic valve sclerosis with mild to moderate aortic stenosis, trace aortic regurgitation and there is aortic root sclerosis/calcification. UPPER GASTROINTESTINAL ENDOSCOPY  3/15       FAMILY Hx:  No family history on file. HOME MEDICATIONS:  Prior to Admission medications    Medication Sig Start Date End Date Taking? Authorizing Provider   Multiple Vitamins-Minerals (MULTI COMPLETE PO) Take by mouth    Historical Provider, MD   valproic acid (DEPAKENE) 250 MG capsule Take 500 mg by mouth nightly    Historical Provider, MD   nitroGLYCERIN (NITROSTAT) 0.4 MG SL tablet Place 0.4 mg under the tongue every 5 minutes as needed for Chest pain up to max of 3 total doses. If no relief after 1 dose, call 911. Historical Provider, MD   eszopiclone (LUNESTA) 2 MG TABS Take 2 mg by mouth nightly as needed. Historical Provider, MD   aspirin 81 MG tablet Take 81 mg by mouth daily    Historical Provider, MD   pantoprazole (PROTONIX) 40 MG tablet Take 1 tablet by mouth daily. 3/13/15   GIANNA Galaviz   imipramine (TOFRANIL) 50 MG tablet Take 50 mg by mouth nightly.     Historical Provider, MD   valproic acid (DEPAKENE) 250 MG capsule Take 250 mg by mouth every morning     Historical Provider, MD       ALLERGIES:  Other    SOCIAL Hx:  Social History     Socioeconomic History    Marital status:      Spouse name: Not on file    Number of children: Not on file    Years of education: Not on file    Highest education level: Not on file   Occupational History    Not on file   Tobacco Use    Smoking status: Never    Smokeless tobacco: Never   Vaping Use    Vaping Use: Never used   Substance and Sexual Activity    Alcohol use: Not Currently    Drug use: Never Sexual activity: Not Currently     Partners: Male   Other Topics Concern    Not on file   Social History Narrative    Denies caffeine. Social Determinants of Health     Financial Resource Strain: Not on file   Food Insecurity: Not on file   Transportation Needs: Not on file   Physical Activity: Not on file   Stress: Not on file   Social Connections: Not on file   Intimate Partner Violence: Not on file   Housing Stability: Not on file       ROS: Positive in bold  General:   Denies chills, fatigue, fever, malaise, night sweats or weight loss    Psychological:   Denies anxiety, disorientation or hallucinations    ENT:    Denies epistaxis, headaches, vertigo or visual changes    Cardiovascular:   Denies any chest pain, irregular heartbeats, or palpitations. No paroxysmal nocturnal dyspnea. Respiratory:   Denies shortness of breath, coughing, sputum production, hemoptysis, or wheezing. No orthopnea. Gastrointestinal:   Denies nausea, vomiting, diarrhea, or constipation. Denies any abdominal pain. Denies change in bowel habits or stools. Genito-Urinary:    Denies any urgency, frequency, hematuria. Voiding without difficulty. Musculoskeletal:   Denies joint pain, joint stiffness, joint swelling or muscle pain    Neurology:    Denies any headache or focal neurological deficits. No weakness or paresthesia. Derm:    Denies any rashes, ulcers, or excoriations. Denies bruising. Extremities:   Denies any lower extremity swelling or edema. PHYSICAL EXAM: Abnormal findings noted  VITALS:  Vitals:    08/27/22 1825   BP: (!) 167/70   Pulse:    Resp:    Temp:    SpO2:          CONSTITUTIONAL:    Awake, alert, cooperative, no apparent distress, and appears stated age    EYES:    EOMI, sclera clear, conjunctiva normal    ENT:    Normocephalic, atraumatic, External ears without lesions.      NECK:    Supple, symmetrical, trachea midline,no JVD    HEMATOLOGIC/LYMPHATICS:    No cervical lymphadenopathy and no supraclavicular lymphadenopathy    LUNGS:    Symmetric. No increased work of breathing, good air exchange, clear to auscultation bilaterally, no wheezes, rhonchi, or rales,     CARDIOVASCULAR:    Normal apical impulse, regular rate and rhythm, normal S1 and S2, no S3 or S4, and no murmur noted    ABDOMEN:    soft, non-distended, non-tender     MUSCULOSKELETAL:    There is no redness, warmth, or swelling of the joints. NEUROLOGIC:    Awake, alert, oriented to name, place and time. SKIN:    No bruising or bleeding. No redness, warmth, or swelling    EXTREMITIES:    Peripheral pulses present. No edema, cyanosis, or swelling.     LINES/CATHETERS     LABORATORY DATA:  CBC with Differential:    Lab Results   Component Value Date/Time    WBC 12.6 08/27/2022 11:30 AM    RBC 3.83 08/27/2022 11:30 AM    HGB 12.6 08/27/2022 11:30 AM    HCT 38.8 08/27/2022 11:30 AM     08/27/2022 11:30 AM    .3 08/27/2022 11:30 AM    MCH 32.9 08/27/2022 11:30 AM    MCHC 32.5 08/27/2022 11:30 AM    RDW 14.2 08/27/2022 11:30 AM    SEGSPCT 57 07/07/2013 01:20 PM    LYMPHOPCT 12.9 08/27/2022 11:30 AM    MONOPCT 10.4 08/27/2022 11:30 AM    BASOPCT 0.2 08/27/2022 11:30 AM    MONOSABS 1.31 08/27/2022 11:30 AM    LYMPHSABS 1.62 08/27/2022 11:30 AM    EOSABS 0.03 08/27/2022 11:30 AM    BASOSABS 0.02 08/27/2022 11:30 AM     CMP:    Lab Results   Component Value Date/Time     08/27/2022 11:30 AM    K 3.8 08/27/2022 11:30 AM    K 5.2 03/14/2022 12:31 PM    CL 92 08/27/2022 11:30 AM    CO2 29 08/27/2022 11:30 AM    BUN 36 08/27/2022 11:30 AM    CREATININE 4.8 08/27/2022 11:30 AM    GFRAA 11 08/27/2022 11:30 AM    LABGLOM 9 08/27/2022 11:30 AM    GLUCOSE 94 08/27/2022 11:30 AM    GLUCOSE 81 01/17/2012 10:55 AM    PROT 6.8 08/27/2022 11:30 AM    LABALBU 4.0 08/27/2022 11:30 AM    LABALBU 4.0 02/09/2011 01:55 PM    CALCIUM 9.6 08/27/2022 11:30 AM    BILITOT 0.3 08/27/2022 11:30 AM    ALKPHOS 93 08/27/2022 11:30 AM    AST 16 08/27/2022 11:30 AM    ALT 5 08/27/2022 11:30 AM       ASSESSMENT/PLAN:  End-stage renal disease on dialysis  Elevated troponin and BNP  Nonobstructive renal calculus on left  Likely viral infection  History of aortic valve replacement with bioprosthetic  Chronic stage II diastolic congestive heart failure  Mild to moderate mitral regurgitation  Mild to moderate tricuspid regurgitation  Hyperlipidemia  Hypertension  Bipolar disorder  Depression    Patient presented with nausea and emesis as well as diarrhea. He has been going on for the last 1 day. She presented to the ED and had a CT abdomen pelvis with IV contrast.  Able to do outpatient dialysis as such patient was admitted for dialysis and nephrology has been consulted.     Valerie Shelley  7:38 PM  8/27/2022    Electronically signed by Allegra Lara DO on 8/27/22 at 7:38 PM EDT

## 2022-08-27 NOTE — ED PROVIDER NOTES
73-year-old female presenting from home with concern about epigastric pain, diarrhea and lightheaded/dizziness. She had complete dialysis yesterday. Is awake, alert and oriented x4, brought in by family. She is well-appearing, most complained of the diarrhea and the lightheadedness. New problem, persistent, mild/moderate severity, lightheaded and diarrhea associated now with the abdominal discomfort. No family history on file. Past Surgical History:   Procedure Laterality Date    AORTIC VALVE REPLACEMENT  8/5/14    Dr Yarelis Sifuentes  4-30-08    Rev AVF wtih vein patch    COLONOSCOPY  3/15    DIAGNOSTIC CARDIAC CATH LAB PROCEDURE  12/21/2010    No significant epicardial CAD. Systemic HTN. Concentric left ventricular hypertrophy with hyperdynamic left ventricular systolic function. Elevated left ventricular end diastolic pressure. Trace aortic regurgitation. DIALYSIS FISTULA CREATION  1-8-08    L AVF Estephanie Cross)    FRACTURE SURGERY  9/8/14    left hip hemiarthroplasty    HYSTERECTOMY      Partial (S/P 3 vaginal deliveries and 1 miscarriage). TRANSTHORACIC ECHOCARDIOGRAM  6/19/2012    Normal LV size, wall thickness, wall motion and systolic function with stage 1 left ventricular diastolic dysfunction, normal right ventricular size and function, moderate aortic valve sclerosis with mild to moderate aortic stenosis, trace aortic regurgitation and there is aortic root sclerosis/calcification. UPPER GASTROINTESTINAL ENDOSCOPY  3/15       Review of Systems   Constitutional:  Negative for chills, fatigue and fever. HENT: Negative. Eyes:  Negative for pain and redness. Respiratory:  Negative for chest tightness and shortness of breath. Cardiovascular:  Negative for chest pain, palpitations and leg swelling. Gastrointestinal:  Positive for abdominal pain and diarrhea. Negative for constipation, nausea and vomiting. Endocrine: Negative for polydipsia and polyuria. Genitourinary:  Negative for flank pain, hematuria and urgency. Musculoskeletal:  Negative for arthralgias, gait problem and joint swelling. Skin: Negative. Allergic/Immunologic: Negative for immunocompromised state. Neurological:  Positive for dizziness and light-headedness. Negative for syncope, speech difficulty, weakness and headaches. Hematological:  Negative for adenopathy. Psychiatric/Behavioral: Negative. All other systems reviewed and are negative. Physical Exam  Constitutional:       General: She is not in acute distress. Appearance: She is well-developed. HENT:      Head: Normocephalic and atraumatic. Eyes:      Conjunctiva/sclera: Conjunctivae normal.      Pupils: Pupils are equal, round, and reactive to light. Neck:      Thyroid: No thyromegaly. Cardiovascular:      Rate and Rhythm: Normal rate and regular rhythm. Comments: Fistula in the left arm with proximal and distal thrills  Pulmonary:      Effort: Pulmonary effort is normal. No respiratory distress. Breath sounds: Normal breath sounds. Abdominal:      General: There is no distension. Palpations: Abdomen is soft. Tenderness: There is no abdominal tenderness. There is no guarding or rebound. Musculoskeletal:         General: No tenderness. Normal range of motion. Cervical back: Normal range of motion. Skin:     General: Skin is warm and dry. Findings: No erythema. Neurological:      Mental Status: She is alert and oriented to person, place, and time. Cranial Nerves: No cranial nerve deficit. Coordination: Coordination normal.        Procedures     [facility]       ED Course as of 08/28/22 0704   Sat Aug 27, 2022   1547 Patient feeling significantly better at this time. Ready be discharged home.   No chest pain, shortness of breath, lightheadedness or dizziness now, no recurrent diarrhea. [SO]      ED Course User Index  [SO] Mliss Solders, DO      ED Course as of 08/28/22 1034   Sat Aug 27, 2022   1547 Patient feeling significantly better at this time. Ready be discharged home. No chest pain, shortness of breath, lightheadedness or dizziness now, no recurrent diarrhea. [SO]      ED Course User Index  [SO] Camron Biswas, DO       --------------------------------------------- PAST HISTORY ---------------------------------------------  Past Medical History:  has a past medical history of Anemia, Aortic stenosis, severe, Arthritis, Bipolar disorder ([facility] [address] CAD (coronary artery disease), Carotid bruit, Chronic kidney disease, CRF (chronic renal failure), Depression, Gout, Hemodialysis patient ([facility] [address] Hyperlipidemia, Hypertension, Left displaced femoral neck fracture ([facility] [address] Murmur, heart, Psychosis ([address] Secondary hyperparathyroidism ([address] and Shingles. Past Surgical History:  has a past surgical history that includes AV fistula repair (4-30-08); Dialysis fistula creation (1-8-08); Hysterectomy; Diagnostic Cardiac Cath Lab Procedure (12/21/2010); transthoracic echocardiogram (6/19/2012); Colonoscopy (3/15); Aortic valve replacement (8/5/14); fracture surgery (9/8/14); and Upper gastrointestinal endoscopy (3/15). Social History:  reports that she has never smoked. She has never used smokeless tobacco. She reports that she does not currently use alcohol. She reports that she does not use drugs. Family History: family history is not on file. The patients home medications have been reviewed. Allergies:  Other    -------------------------------------------------- RESULTS -------------------------------------------------    LABS:  Results for orders placed or performed during the hospital encounter of 08/27/22   COVID-19, Rapid    Specimen: Nasopharyngeal Swab; Nasal swab   Result Value Ref Range    SARS-CoV-2, NAAT Not Detected Not Detected   CBC with Auto Differential   Result Value Ref Range    WBC 12.6 (H) 4.5 - 11.5 E9/L    RBC 3.83 3.50 - 5.50 E12/L    Hemoglobin 12.6 11.5 - 15.5 g/dL    Hematocrit 38.8 34.0 - 48.0 %    .3 (H) 80.0 - 99.9 fL    MCH 32.9 26.0 - 35.0 pg    MCHC 32.5 32.0 - 34.5 %    RDW 14.2 11.5 - 15.0 fL    Platelets 117 385 - 175 E9/L    MPV 10.5 7.0 - 12.0 fL    Neutrophils % 75.9 43.0 - 80.0 %    Immature Granulocytes % 0.4 0.0 - 5.0 %    Lymphocytes % 12.9 (L) 20.0 - 42.0 %    Monocytes % 10.4 2.0 - 12.0 %    Eosinophils % 0.2 0.0 - 6.0 %    Basophils % 0.2 0.0 - 2.0 %    Neutrophils Absolute 9.52 (H) 1.80 - 7.30 E9/L    Immature Granulocytes # 0.05 E9/L    Lymphocytes Absolute 1.62 1.50 - 4.00 E9/L    Monocytes Absolute 1.31 (H) 0.10 - 0.95 E9/L    Eosinophils Absolute 0.03 (L) 0.05 - 0.50 E9/L    Basophils Absolute 0.02 0.00 - 0.20 E9/L   Comprehensive Metabolic Panel   Result Value Ref Range    Sodium 137 132 - 146 mmol/L    Potassium 3.8 3.5 - 5.0 mmol/L    Chloride 92 (L) 98 - 107 mmol/L    CO2 29 22 - 29 mmol/L    Anion Gap 16 7 - 16 mmol/L    Glucose 94 74 - 99 mg/dL    BUN 36 (H) 6 - 23 mg/dL    Creatinine 4.8 (H) 0.5 - 1.0 mg/dL    GFR Non-African American 9 >=60 mL/min/1.73    GFR African American 11     Calcium 9.6 8.6 - 10.2 mg/dL    Total Protein 6.8 6.4 - 8.3 g/dL    Albumin 4.0 3.5 - 5.2 g/dL    Total Bilirubin 0.3 0.0 - 1.2 mg/dL    Alkaline Phosphatase 93 35 - 104 U/L    ALT 5 0 - 32 U/L    AST 16 0 - 31 U/L   Troponin   Result Value Ref Range    Troponin, High Sensitivity 79 (H) 0 - 9 ng/L   Brain Natriuretic Peptide   Result Value Ref Range    Pro-BNP 9,696 (H) 0 - 450 pg/mL   Lipase   Result Value Ref Range    Lipase 102 (H) 13 - 60 U/L   Troponin   Result Value Ref Range    Troponin, High Sensitivity 84 (H) 0 - 9 ng/L       RADIOLOGY:  CT ABDOMEN PELVIS W IV CONTRAST Additional Contrast? None   Final Result   1. No acute process in abdomen or pelvis. 2. Diverticulosis. 3. Atrophy of both kidneys with multiple bilateral renal cysts. 4. Nonobstructing 2 mm left renal calculus.              ------------------------- NURSING NOTES AND VITALS REVIEWED ---------------------------  Date / Time Roomed:  8/27/2022 10:55 AM  ED Bed Assignment:  [id number]    The nursing notes within the ED encounter and vital signs as below have been reviewed. Patient Vitals for the past 24 hrs:   BP Temp Temp src Pulse Resp SpO2 Height Weight   08/28/22 0630 (!) 124/50 98 °F (36.7 °C) Oral 77 17 98 % -- --   08/27/22 2215 125/65 98.1 °F (36.7 °C) Oral 84 17 98 % -- --   08/27/22 2040 (!) 96/53 98.1 °F (36.7 °C) -- 88 18 -- -- 115 lb 1.3 oz (52.2 kg)   08/27/22 2030 (!) 94/55 -- -- 93 -- -- -- --   08/27/22 2000 134/82 -- -- 100 -- -- -- --   08/27/22 1930 (!) 143/82 -- -- 84 -- -- -- --   08/27/22 1900 (!) 174/86 -- -- 75 -- -- -- --   08/27/22 1825 (!) 167/70 98 °F (36.7 °C) -- 64 18 -- -- 117 lb 11.6 oz (53.4 kg)   08/27/22 1719 -- -- -- -- -- -- 5' 3\" (1.6 m) 117 lb 6.4 oz (53.3 kg)   08/27/22 1700 (!) 156/56 98.2 °F (36.8 °C) Oral 61 16 97 % -- --   08/27/22 1650 (!) 138/55 -- -- 72 16 98 % -- --   08/27/22 1053 (!) 117/58 -- -- -- -- -- -- 117 lb (53.1 kg)   08/27/22 1020 -- 98.6 °F (37 °C) Oral (!) 104 18 98 % -- --       Oxygen Saturation Interpretation: Normal    ------------------------------------------ PROGRESS NOTES ------------------------------------------  Re-evaluation(s):   Patients symptoms are improving  Repeat physical examination is improved    Counseling:  I have spoken with the patient and grandson  and discussed todays results, in addition to providing specific details for the plan of care and counseling regarding the diagnosis and prognosis. Their questions are answered at this time and they are agreeable with the plan of admission.    --------------------------------- ADDITIONAL PROVIDER NOTES ---------------------------------  Consultations:  Spoke with Dr. Rachid Cottrell, nephrology. She will dialyze the patient today. Spoke with Dr. Pedro Koo who will admit the patient for dialysis. Discussed case. They will admit the patient.   This patient's ED course included: a personal history and physicial examination, re-evaluation prior to disposition, and multiple bedside re-evaluations    This patient has remained hemodynamically stable during their ED course. Diagnosis:  1. ESRD on hemodialysis ([facility] [address]    2. Lightheaded    3. Diarrhea, unspecified type        Disposition:  Patient's disposition: Admit to dialysis  Patient's condition is stable.            Nirmala Osorio,   08/28/22 2501

## 2022-08-28 VITALS
BODY MASS INDEX: 20.39 KG/M2 | TEMPERATURE: 98.1 F | HEART RATE: 76 BPM | WEIGHT: 115.08 LBS | OXYGEN SATURATION: 98 % | RESPIRATION RATE: 16 BRPM | DIASTOLIC BLOOD PRESSURE: 54 MMHG | SYSTOLIC BLOOD PRESSURE: 111 MMHG | HEIGHT: 63 IN

## 2022-08-28 LAB
ALBUMIN SERPL-MCNC: 3.7 G/DL (ref 3.5–5.2)
ALP BLD-CCNC: 94 U/L (ref 35–104)
ALT SERPL-CCNC: <5 U/L (ref 0–32)
ANION GAP SERPL CALCULATED.3IONS-SCNC: 14 MMOL/L (ref 7–16)
AST SERPL-CCNC: 16 U/L (ref 0–31)
BACTERIA: ABNORMAL /HPF
BASOPHILS ABSOLUTE: 0.05 E9/L (ref 0–0.2)
BASOPHILS RELATIVE PERCENT: 0.4 % (ref 0–2)
BILIRUB SERPL-MCNC: 0.4 MG/DL (ref 0–1.2)
BILIRUBIN URINE: NEGATIVE
BLOOD, URINE: ABNORMAL
BUN BLDV-MCNC: 25 MG/DL (ref 6–23)
CALCIUM SERPL-MCNC: 9.4 MG/DL (ref 8.6–10.2)
CHLORIDE BLD-SCNC: 94 MMOL/L (ref 98–107)
CLARITY: CLEAR
CO2: 29 MMOL/L (ref 22–29)
COLOR: YELLOW
CREAT SERPL-MCNC: 4.3 MG/DL (ref 0.5–1)
EOSINOPHILS ABSOLUTE: 0.11 E9/L (ref 0.05–0.5)
EOSINOPHILS RELATIVE PERCENT: 0.8 % (ref 0–6)
EPITHELIAL CELLS, UA: ABNORMAL /HPF
GFR AFRICAN AMERICAN: 12
GFR NON-AFRICAN AMERICAN: 10 ML/MIN/1.73
GLUCOSE BLD-MCNC: 89 MG/DL (ref 74–99)
GLUCOSE URINE: NEGATIVE MG/DL
HCT VFR BLD CALC: 39.4 % (ref 34–48)
HEMOGLOBIN: 12.7 G/DL (ref 11.5–15.5)
IMMATURE GRANULOCYTES #: 0.08 E9/L
IMMATURE GRANULOCYTES %: 0.6 % (ref 0–5)
KETONES, URINE: NEGATIVE MG/DL
LEUKOCYTE ESTERASE, URINE: ABNORMAL
LYMPHOCYTES ABSOLUTE: 2.83 E9/L (ref 1.5–4)
LYMPHOCYTES RELATIVE PERCENT: 20.1 % (ref 20–42)
MAGNESIUM: 2.4 MG/DL (ref 1.6–2.6)
MCH RBC QN AUTO: 32.6 PG (ref 26–35)
MCHC RBC AUTO-ENTMCNC: 32.2 % (ref 32–34.5)
MCV RBC AUTO: 101.3 FL (ref 80–99.9)
MONOCYTES ABSOLUTE: 1.3 E9/L (ref 0.1–0.95)
MONOCYTES RELATIVE PERCENT: 9.3 % (ref 2–12)
NEUTROPHILS ABSOLUTE: 9.68 E9/L (ref 1.8–7.3)
NEUTROPHILS RELATIVE PERCENT: 68.8 % (ref 43–80)
NITRITE, URINE: NEGATIVE
PDW BLD-RTO: 14.4 FL (ref 11.5–15)
PH UA: 7 (ref 5–9)
PHOSPHORUS: 4.2 MG/DL (ref 2.5–4.5)
PLATELET # BLD: 228 E9/L (ref 130–450)
PMV BLD AUTO: 10.5 FL (ref 7–12)
POTASSIUM SERPL-SCNC: 4.1 MMOL/L (ref 3.5–5)
PROCALCITONIN: 0.34 NG/ML (ref 0–0.08)
PROTEIN UA: 30 MG/DL
RBC # BLD: 3.89 E12/L (ref 3.5–5.5)
RBC UA: ABNORMAL /HPF (ref 0–2)
SODIUM BLD-SCNC: 137 MMOL/L (ref 132–146)
SPECIFIC GRAVITY UA: <=1.005 (ref 1–1.03)
T4 FREE: 1.37 NG/DL (ref 0.93–1.7)
TOTAL PROTEIN: 6.8 G/DL (ref 6.4–8.3)
TSH SERPL DL<=0.05 MIU/L-ACNC: 3.51 UIU/ML (ref 0.27–4.2)
UROBILINOGEN, URINE: 0.2 E.U./DL
WBC # BLD: 14.1 E9/L (ref 4.5–11.5)
WBC UA: ABNORMAL /HPF (ref 0–5)

## 2022-08-28 PROCEDURE — 2580000003 HC RX 258: Performed by: INTERNAL MEDICINE

## 2022-08-28 PROCEDURE — 85025 COMPLETE CBC W/AUTO DIFF WBC: CPT

## 2022-08-28 PROCEDURE — 36415 COLL VENOUS BLD VENIPUNCTURE: CPT

## 2022-08-28 PROCEDURE — 6360000002 HC RX W HCPCS: Performed by: INTERNAL MEDICINE

## 2022-08-28 PROCEDURE — 84439 ASSAY OF FREE THYROXINE: CPT

## 2022-08-28 PROCEDURE — 84443 ASSAY THYROID STIM HORMONE: CPT

## 2022-08-28 PROCEDURE — 6370000000 HC RX 637 (ALT 250 FOR IP): Performed by: INTERNAL MEDICINE

## 2022-08-28 PROCEDURE — 83735 ASSAY OF MAGNESIUM: CPT

## 2022-08-28 PROCEDURE — 81001 URINALYSIS AUTO W/SCOPE: CPT

## 2022-08-28 PROCEDURE — 84145 PROCALCITONIN (PCT): CPT

## 2022-08-28 PROCEDURE — 84100 ASSAY OF PHOSPHORUS: CPT

## 2022-08-28 PROCEDURE — 80053 COMPREHEN METABOLIC PANEL: CPT

## 2022-08-28 PROCEDURE — 99215 OFFICE O/P EST HI 40 MIN: CPT | Performed by: INTERNAL MEDICINE

## 2022-08-28 PROCEDURE — 96372 THER/PROPH/DIAG INJ SC/IM: CPT

## 2022-08-28 RX ORDER — SEVELAMER CARBONATE 800 MG/1
1 TABLET, FILM COATED ORAL 2 TIMES DAILY WITH MEALS
Qty: 60 TABLET | Refills: 0
Start: 2022-08-28 | End: 2022-09-27

## 2022-08-28 RX ADMIN — ASPIRIN 81 MG: 81 TABLET, COATED ORAL at 09:04

## 2022-08-28 RX ADMIN — VALPROIC ACID 250 MG: 250 CAPSULE, LIQUID FILLED ORAL at 09:04

## 2022-08-28 RX ADMIN — PANTOPRAZOLE SODIUM 40 MG: 40 TABLET, DELAYED RELEASE ORAL at 09:04

## 2022-08-28 RX ADMIN — HEPARIN SODIUM 5000 UNITS: 5000 INJECTION INTRAVENOUS; SUBCUTANEOUS at 06:42

## 2022-08-28 RX ADMIN — Medication 10 ML: at 09:05

## 2022-08-28 NOTE — FLOWSHEET NOTE
08/27/22 2040   Vital Signs   BP (!) 96/53   Temp 98.1 °F (36.7 °C)   Heart Rate 88   Resp 18   Weight 115 lb 1.3 oz (52.2 kg)   Weight Method Bed scale   Percent Weight Change -2.25   Pain Assessment   Pain Assessment None - Denies Pain   Post-Hemodialysis Assessment   Post-Treatment Procedures Blood returned; Access bleeding time < 10 minutes   Machine Disinfection Process Acid/Vinegar Clean;Exterior Machine Disinfection;Bleach   Rinseback Volume (ml) 300 ml   Blood Volume Processed (Liters) 38 l/min   Dialyzer Clearance Lightly streaked   Duration of Treatment (minutes) 135 minutes   Hemodialysis Intake (ml) 300 ml   Hemodialysis Output (ml) 1567 ml   NET Removed (ml) 1267   Tolerated Treatment Good   Patient Response to Treatment tx terminated early at pt request due to feeling ill Dr Abdirashid Nunez made aware   Bilateral Breath Sounds Diminished   Time Off 2040   Patient Disposition Return to room

## 2022-08-28 NOTE — DISCHARGE INSTRUCTIONS
Your information:  Name: Heaven Florence  : 1940    Your instructions:    YOU ARE BEING DISCHARGED HOME. PLEASE MAKE AND KEEP ALL FOLLOW-UP APPOINTMENTS. IF YOU EXPERIENCE CHEST PAIN, SHORTNESS OF BREATH, SWEATING, LIGHTHEADEDNESS, OR PALPITATIONS: CALL 911 OR GO TO THE EMERGENCY DEPARTMENT IMMEDIATELY. If you begin to feel unwell or symptoms persist, contact your physician. What to do after you leave the hospital:    Recommended diet: regular diet    Recommended activity: activity as tolerated        The following personal items were collected during your admission and were returned to you:    Belongings  Dental Appliances: None  Vision - Corrective Lenses: Eyeglasses, At bedside  Hearing Aid: None  Clothing: Footwear, Pajamas, Shirt, Socks, Undergarments, At bedside  Jewelry: Earrings, Necklace, Watch  Body Piercings Removed: N/A  Electronic Devices: None  Weapons (Notify Protective Services/Security): None  Other Valuables: Purse  Home Medications: None  Valuables Given To: Patient  Provide Name(s) of Who Valuable(s) Were Given To: Anthony Ceja  Responsible person(s) in the waiting room: N/A  Patient approves for provider to speak to responsible person post operatively: Yes    Information obtained by:  By signing below, I understand that if any problems occur once I leave the hospital I am to contact My PCP. I understand and acknowledge receipt of the instructions indicated above.

## 2022-08-28 NOTE — CONSULTS
CHIEF COMPLAINT: Elevated troponin    HISTORY OF PRESENT ILLNESS:   Patient is 82-year-old female presented to the ED with malaise. Patient has been experiencing diarrhea as well as nausea for the last day. She complains of dry heaves. Her appetite has diminished significantly. She admits to subjective fever and chills. She has chronic abdominal pain which has not changed. Patient was given IV contrast in the ED for CT abdomen and pelvis. She has a history of nonobstructive coronary arteries, aortic stenosis status post bioprosthetic AVR, anomalous right coronary artery. She receives hemodialysis on Monday Wednesdays and Fridays. She states that she was feeling weak and tired after her dialysis session on Friday. She started having diarrhea at that time. She presented complaining of fatigue and malaise. She has not responded to treatment. Troponin was found to be elevated and I was consulted for further recommendations. She denies any chest pains in the last few days. She denies shortness of breath. She is feeling a lot better overall today. CARDIOVASCULAR HISTORY:      Hypertension. Hyperlipidemia. Coronary artery disease:  a. 12/21/2010 Cardiac catheterization: No significant epicardial coronary artery disease. Systemic hypertension. Concentric left ventricular hypertrophy with hyperdynamic left ventricular systolic function. Elevated left ventricular end diastolic pressure. Trace aortic regurgitation. b.  7/11/2014 Right and left heart catheterization:  Severe calcific aortic stenosis. Systemic hypertension. Patent coronary arteries. Patent carotid arteries. Normal right sided pressures. xochitl Archer nuclear stress test done on 8/7/2019 showed no evidence of fixed or reversible perfusion abnormality with normal wall motion and a calculated ejection fraction of 77%. Severe aortic stenosis.               a.  6/25/2014 Echocardiogram showed normal left ventricular size, wall motion and systolic                             function with Stage 1 left ventricular diastolic dysfunction. Normal right ventricular size and               function. Moderate aortic valve sclerosis with severe aortic stenosis and mild aortic regurgitation. b.  Aortic valve mean gradient of 47 mmHg with an aortic valve area of 0.65 sq cm on cardiac               catheterization on 7/11/2014.               c.  Aortic valve replacement with a 21 mm [employer]tronic Mosaic bioprosthesis tissue valve, 7/30/2014.                    d.  Echocardiogram done on 03/17/2016 showed normal left ventricular size with borderline left ventricular hypertrophy with an ejection fraction of 75% with stage 1 left ventricular diastolic dysfunction. Normal right ventricular        size and function. Mildly dilated left atrium. Mild mitral annular calcification with mild to moderate mitral regurgitation. Bioprosthesis in the aortic position, which is well seated, but the individual aortic leaflets were not well             visualized with a calculated mean gradient across the aortic prosthesis of 25.25 mmHg with no aortic regurgitation. Sclerotic and calcified aortic root.          e.  Echocardiogram done on 3/12/2020 showed normal left ventricular size with borderline concentric left ventriclar hypertrophy with no regional wall motion abnormality with an estimated ejection fraction of 75-80% with stage 2 left ventricular diastolic dysfunction. Normal right ventricular size and function. Moderately dilated left atrium. Focal calcification of the mitral valve leaflets with moderate mitral annular calcification without mitral stenosis and with mild to moderate mitral regurgitation. Mild prosthetic aortic valve, which appeared well seated with a mean gradient of 28.33 mmHg with no aortic regurgitation. Mild to moderate tricuspid regurgitation.     Unroofing of anomalous right coronary artery at the time of the aortic valve surgery, 7/30/2014. Paroxysmal atrial fibrillation. a. S/P Modified Maze procedure with bipolar radiofrequency ablation, 7/30/2014 at the time of the aortic valve replacement surgery and the unroofing of the anomalous right coronary artery. Past Medical History:   Diagnosis Date    Anemia     Aortic stenosis, severe 6/25/14    Arthritis     Bipolar disorder ([address]     Psychosis, anxiety, depression.     CAD (coronary artery disease)     Carotid bruit 10/11/2012    Chronic kidney disease     CRF (chronic renal failure) 4/10/2012    Depression     Gout     Hemodialysis patient ([address]     kiki alanis sat    Hyperlipidemia     Hypertension     Left displaced femoral neck fracture ([address] 10/13/2014    Murmur, heart     Psychosis ([address]     Secondary hyperparathyroidism ([facility]     Shingles        Patient Active Problem List   Diagnosis    Hyperparathyroidism due to renal insufficiency (HCC)    Gout    Bipolar disorder ([facility]    Anemia    Coronary atherosclerosis of native coronary artery    S/P AVR    PAF (paroxysmal atrial fibrillation) ([facility])    S/P Maze operation for atrial fibrillation    ESRD (end stage renal disease) on dialysis ([facility])    Hyperkalemia, diminished renal excretion    Chest pain    Hyperlipidemia LDL goal <100    Essential hypertension    Syncope and collapse    Facial laceration    Aortic valve disease    Pure hypercholesterolemia    Stage 5 chronic kidney disease on chronic dialysis ([facility]    ESRD on hemodialysis ([facility])       Allergies   Allergen Reactions    Other      Pt states she cannot take any type of pain medication, she throws up and never stops       Current Facility-Administered Medications   Medication Dose Route Frequency Provider Last Rate Last Admin    glucose chewable tablet 16 g  4 tablet Oral PRN Lia Caridad Smith, DO        dextrose bolus 10% 125 mL  125 mL IntraVENous PRN Ivonne Reece DO        Or    dextrose bolus 10% 250 mL  250 mL IntraVENous PRN Peyton CONTRERAS Luis, DO        glucagon (rDNA) injection 1 mg  1 mg SubCUTAneous PRN Ples Salvage, DO        dextrose 10 % infusion   IntraVENous Continuous PRN Ples Salvage, DO        sodium chloride flush 0.9 % injection 5-40 mL  5-40 mL IntraVENous 2 times per day Ples Salvage, DO   10 mL at 08/28/22 1810    sodium chloride flush 0.9 % injection 5-40 mL  5-40 mL IntraVENous PRN Ples Salvage, DO        0.9 % sodium chloride infusion   IntraVENous PRN Ples Salvage, DO        heparin (porcine) injection 5,000 Units  5,000 Units SubCUTAneous 3 times per day Ples Salvage, DO   5,000 Units at 08/28/22 8737    polyethylene glycol (GLYCOLAX) packet 17 g  17 g Oral Daily PRN Ples Salvage, DO        acetaminophen (TYLENOL) tablet 650 mg  650 mg Oral Q6H PRN Ples Salvage, DO        Or    acetaminophen (TYLENOL) suppository 650 mg  650 mg Rectal Q6H PRN Ples Salvage, DO        aspirin EC tablet 81 mg  81 mg Oral Daily Ples Salvage, DO   81 mg at 08/28/22 7451    pantoprazole (PROTONIX) tablet 40 mg  40 mg Oral Daily Ples Salvage, DO   40 mg at 08/28/22 2052    sevelamer (RENVELA) tablet 800 mg  800 mg Oral BID WC Ples Salvage, DO        valproic acid (DEPAKENE) capsule 500 mg  500 mg Oral Nightly Ples Salvage, DO   500 mg at 08/27/22 2222    valproic acid (DEPAKENE) capsule 250 mg  250 mg Oral QAM Ples Salvage, DO   250 mg at 08/28/22 7099    ondansetron (ZOFRAN-ODT) disintegrating tablet 4 mg  4 mg Oral Q8H PRN Ples Salvage, DO   4 mg at 08/27/22 2122       Social History     Socioeconomic History    Marital status:      Spouse name: Not on file    Number of children: Not on file    Years of education: Not on file    Highest education level: Not on file   Occupational History    Not on file   Tobacco Use    Smoking status: Never    Smokeless tobacco: Never   Vaping Use    Vaping Use: Never used   Substance and Sexual Activity    Alcohol use: Not Currently    Drug use: Never    Sexual activity: Not Currently     Partners: Male   Other Topics Concern    Not on file   Social History Narrative    Denies caffeine. Social Determinants of Health     Financial Resource Strain: Not on file   Food Insecurity: Not on file   Transportation Needs: Not on file   Physical Activity: Not on file   Stress: Not on file   Social Connections: Not on file   Intimate Partner Violence: Not on file   Housing Stability: Not on file       No family history on file. Review of Systems:   Heart: as above   Lungs: as above   Eyes: denies changes in vision or discharge. Ears: denies changes in hearing or pain. Nose: denies epistaxis or masses   Throat: denies sore throat or trouble swallowing. Neuro: denies numbness, tingling, tremors. Skin: denies rashes or itching. : denies hematuria, dysuria   GI: denies vomiting, diarrhea   Psych: denies mood changed, anxiety, depression. all others negative. Physical Exam   BP (!) 111/54   Pulse 76   Temp 98.1 °F (36.7 °C) (Oral)   Resp 16   Ht 5' 3\" (1.6 m)   Wt 115 lb 1.3 oz (52.2 kg)   SpO2 98%   BMI 20.39 kg/m²   Constitutional: Oriented to person, place, and time. Well-developed and well-nourished. No distress. Head: Normocephalic and atraumatic. Eyes: EOM are normal. Pupils are equal, round, and reactive to light. Neck: Normal range of motion. Neck supple. No hepatojugular reflux and no JVD present. Carotid bruit is not present. No tracheal deviation present. No thyromegaly present. Cardiovascular: Normal rate, regular rhythm, normal heart sounds and intact distal pulses. Exam reveals no gallop and no friction rub. No murmur heard. Pulmonary/Chest: Effort normal and breath sounds normal. No respiratory distress. No wheezes. No rales. No tenderness. Abdominal: Soft. Bowel sounds are normal. No distension and no mass. No tenderness. No rebound and no guarding. Musculoskeletal: Normal range of motion. No edema and no tenderness. Lymphadenopathy:   No cervical adenopathy. No groin adenopathy. Neurological: Alert and oriented to person, place, and time. Skin: Skin is warm and dry. No rash noted. Not diaphoretic. No erythema. Psychiatric: Normal mood and affect. Behavior is normal.     CBC:   Lab Results   Component Value Date/Time    WBC 14.1 08/28/2022 07:52 AM    RBC 3.89 08/28/2022 07:52 AM    HGB 12.7 08/28/2022 07:52 AM    HCT 39.4 08/28/2022 07:52 AM    .3 08/28/2022 07:52 AM    MCH 32.6 08/28/2022 07:52 AM    MCHC 32.2 08/28/2022 07:52 AM    RDW 14.4 08/28/2022 07:52 AM     08/28/2022 07:52 AM    MPV 10.5 08/28/2022 07:52 AM     BMP:   Lab Results   Component Value Date/Time     08/27/2022 11:30 AM    K 3.8 08/27/2022 11:30 AM    K 5.2 03/14/2022 12:31 PM    CL 92 08/27/2022 11:30 AM    CO2 29 08/27/2022 11:30 AM    BUN 36 08/27/2022 11:30 AM    LABALBU 4.0 08/27/2022 11:30 AM    LABALBU 4.0 02/09/2011 01:55 PM    CREATININE 4.8 08/27/2022 11:30 AM    CALCIUM 9.6 08/27/2022 11:30 AM    GFRAA 11 08/27/2022 11:30 AM    LABGLOM 9 08/27/2022 11:30 AM     Magnesium:    Lab Results   Component Value Date/Time    MG 2.3 08/06/2019 02:00 PM     Cardiac Enzymes:   Lab Results   Component Value Date    CKTOTAL 70 07/23/2020    CKTOTAL 73 06/15/2012    CKTOTAL 106 06/14/2012    CKMB 2.3 07/23/2020    CKMB 2.6 06/15/2012    CKMB 2.9 06/14/2012    TROPHS 84 (H) 08/27/2022    TROPHS 79 (H) 08/27/2022    TROPHS 66 (H) 03/14/2022      PT/INR:    Lab Results   Component Value Date/Time    PROTIME 11.7 07/22/2020 02:56 PM    PROTIME 12.5 12/21/2010 04:35 AM    INR 1.0 07/22/2020 02:56 PM     TSH:  No results found for: TSH    Rhythm Strip: Patient is currently not on a monitor. EKG: Ordered and pending. ASSESSMENT / DIAGNOSIS:   1.   Anomalous coronary artery, S/P unroofing of the right coronary artery during open heart surgery, 7/30/2014.    2.  S/p aortic valve replacement with a tissue prosthesis, 7/30/2014 with elevated gradient across the tissue prosthesis on the most recent echocardiogram in 3/2020.    3.  Paroxysmal atrial fibrillation, S/P modified Maze procedure and bipolar radiofrequency ablation procedure, 7/30/2014, patient is in sinus rhythm. 4.  End stage renal disease on hemodialysis through left wrist AV fistula, Tuesday's, Thursday's and Saturday's. 5.  Secondary hyperparathyroidism. 6.  Anemia of chronic disease. 7.  Hypertension: Adequately controlled. 8.  History of hyperlipidemia. 9.  Bipolar disorder. 10.  History of gout. 11.  Mild to moderate mitral regurgitation on echocardiogram in 3/2020. 12.  Elevated troponin. Recommendations:  Her troponin is elevated and the trend appears to be flat. No active signs of ischemia. This is most likely demand ischemia in the acute setting and in the presence of end-stage renal disease. No need for ischemic work-up at this point. Continue home aspirin. Consider statin initiation as an outpatient. She does appear stable and euvolemic on examination. Nephrology is on board. She is due for hemodialysis tomorrow. Defer management of diarrhea and abdominal pain to primary team.    She should follow-up with Dr. Doris Cárdenas in 4 to 6 weeks after discharge. Thank you very much for allowing to participate in the care of this patient! I will sign off today. Please not hesitate to call me with any questions or concerns. Juany Schultz MD  [facility]) Cardiology     NOTE: This report was transcribed using voice recognition software. Every effort was made to ensure accuracy; however, inadvertent computerized transcription errors may be present.

## 2022-08-28 NOTE — CONSULTS
Nephrology Consult  [facility]  Murphy Smith. Romeo MOE    CC:   esrd    HPI:   the pt is an [de-identified] yo female who presented to the er yesterday due to diarrhea and abd pain and nausea for one day. She has a pmh of esrd on hd mwf in [place], cad, bpd, djd, htn, hyperlipidemia, shingles. Avr, irritable bowel. She underwent a ct with dye in the er which was unremarkable. She was admitted for hd since she needed it within 24 hrs of getting dye. She cut the hd short last night due to \"pain\" in her joints. Today she feels fine and she thinks the diarrhea was from anxiety which she frequently has with her ibs. No fever,chills. brbpr, hematemesis, cough sob cp. PMH:    Past Medical History:   Diagnosis Date    Anemia     Aortic stenosis, severe 6/25/14    Arthritis     Bipolar disorder ([facility])     Psychosis, anxiety, depression.     CAD (coronary artery disease)     Carotid bruit 10/11/2012    Chronic kidney disease     CRF (chronic renal failure) 4/10/2012    Depression     Gout     Hemodialysis patient ([address]     [place] laura alanis sat    Hyperlipidemia     Hypertension     Left displaced femoral neck fracture ([address] 10/13/2014    Murmur, heart     Psychosis ([address]     Secondary hyperparathyroidism ([address]     Shingles        Patient Active Problem List   Diagnosis    Hyperparathyroidism due to renal insufficiency ([facility])    Gout    Bipolar disorder ([address]    Anemia    Coronary atherosclerosis of native coronary artery    S/P AVR    PAF (paroxysmal atrial fibrillation) ([facility])    S/P Maze operation for atrial fibrillation    ESRD (end stage renal disease) on dialysis ([facility])    Hyperkalemia, diminished renal excretion    Chest pain    Hyperlipidemia LDL goal <100    Essential hypertension    Syncope and collapse    Facial laceration    Aortic valve disease    Pure hypercholesterolemia    Stage 5 chronic kidney disease on chronic dialysis ([address]    ESRD on hemodialysis ([address]       Meds:     sodium chloride flush  5-40 mL IntraVENous 2 times per day    heparin (porcine)  5,000 Units SubCUTAneous 3 times per day    aspirin  81 mg Oral Daily    pantoprazole  40 mg Oral Daily    sevelamer  800 mg Oral BID WC    valproic acid  500 mg Oral Nightly    valproic acid  250 mg Oral QAM        dextrose      sodium chloride         Meds prn:     glucose, dextrose bolus **OR** dextrose bolus, glucagon (rDNA), dextrose, sodium chloride flush, sodium chloride, polyethylene glycol, acetaminophen **OR** acetaminophen, ondansetron    Meds prior to admission:     No current facility-administered medications on file prior to encounter. Current Outpatient Medications on File Prior to Encounter   Medication Sig Dispense Refill    Multiple Vitamins-Minerals (MULTI COMPLETE PO) Take by mouth      valproic acid (DEPAKENE) 250 MG capsule Take 500 mg by mouth nightly      nitroGLYCERIN (NITROSTAT) 0.4 MG SL tablet Place 0.4 mg under the tongue every 5 minutes as needed for Chest pain up to max of 3 total doses. If no relief after 1 dose, call 911.      eszopiclone (LUNESTA) 2 MG TABS Take 2 mg by mouth nightly as needed. aspirin 81 MG tablet Take 81 mg by mouth daily      pantoprazole (PROTONIX) 40 MG tablet Take 1 tablet by mouth daily. 30 tablet 10    imipramine (TOFRANIL) 50 MG tablet Take 50 mg by mouth nightly. valproic acid (DEPAKENE) 250 MG capsule Take 250 mg by mouth every morning          Allergies: Other    Social History:     reports that she has never smoked. She has never used smokeless tobacco. She reports that she does not currently use alcohol. She reports that she does not use drugs. Family History:     No family history on file.     ROS:     General: no fever, chills   Heent: no nasal congestion, sore throat   Resp: no cough, sob , hemoptysis  Cardiac: no cp , le edema, palpitations  Gi: idarrhea abd pain on admissiond  Gu: no hematuria, dysuria   Neruo: no numbness, weakness, headache, blurry vision   Endocrine:  no h/o dm  Derm: no rash , petechia  Heme: no epistaxis, bruising  All other sx negative     Physical Exam:      Patient Vitals for the past 24 hrs:   BP Temp Temp src Pulse Resp SpO2 Height Weight   08/28/22 0900 (!) 111/54 98.1 °F (36.7 °C) Oral 76 16 98 % -- --   08/28/22 0630 (!) 124/50 98 °F (36.7 °C) Oral 77 17 98 % -- --   08/27/22 2215 125/65 98.1 °F (36.7 °C) Oral 84 17 98 % -- --   08/27/22 2040 (!) 96/53 98.1 °F (36.7 °C) -- 88 18 -- -- 115 lb 1.3 oz (52.2 kg)   08/27/22 2030 (!) 94/55 -- -- 93 -- -- -- --   08/27/22 2000 134/82 -- -- 100 -- -- -- --   08/27/22 1930 (!) 143/82 -- -- 84 -- -- -- --   08/27/22 1900 (!) 174/86 -- -- 75 -- -- -- --   08/27/22 1825 (!) 167/70 98 °F (36.7 °C) -- 64 18 -- -- 117 lb 11.6 oz (53.4 kg)   08/27/22 1719 -- -- -- -- -- -- 5' 3\" (1.6 m) 117 lb 6.4 oz (53.3 kg)   08/27/22 1700 (!) 156/56 98.2 °F (36.8 °C) Oral 61 16 97 % -- --   08/27/22 1650 (!) 138/55 -- -- 72 16 98 % -- --         Intake/Output Summary (Last 24 hours) at 8/28/2022 1234  Last data filed at 8/27/2022 2040  Gross per 24 hour   Intake 300 ml   Output 1567 ml   Net -1267 ml       Constitutional: Patient in no acute distress   Head: normocephalic, atraumatic   Neck: supple, no jvd  Cardiovascular: regular rate and rhythm, no murmurs, gallops, or rubs   Respiratory: Clear, no rales, rhochi, or wheezes,   Gastrointestinal: soft, nontender, nondistended, no hepatosplenomegaly  Ext: no edema  Neuro:aaox3  Skin: dry, no rash   Back: nontender    Data:    Recent Labs     08/27/22  1130 08/28/22  0752   WBC 12.6* 14.1*   HGB 12.6 12.7   HCT 38.8 39.4   .3* 101.3*    228       Recent Labs     08/27/22  1130 08/28/22  0752    137   K 3.8 4.1   CL 92* 94*   CO2 29 29   CREATININE 4.8* 4.3*   BUN 36* 25*   LABGLOM 9 10   GLUCOSE 94 89   CALCIUM 9.6 9.4   PHOS  --  4.2   MG  --  2.4       Vit D, 25-Hydroxy   Date Value Ref Range Status   02/09/2011 19 (L) 30 - 80 ng/mL Final     Comment:     REFERENCE INTERVAL- Vitamin D, 25-Hydroxy    This assay accurately quantifies the sum of vitamin D3,  25-hydroxy and vitamin D2, 25-hydroxy. 0-17 years-  Deficiency- less than 20 ng/mL  Optimum level- greater than or equal to 20 ng/mL*  *(Zuñiga CL et al. Pediatrics 2008- 122- 1128-38.)    18 years and older-  Deficiency- Less than 20 ng/mL  Insufficiency- 20-29 ng/mL  Optimum Level- 30-80 ng/mL  Possible Toxicity- Greater than 150 ng/mL       No results found for: PTH    Recent Labs     08/27/22  1130 08/28/22  0752   ALT 5 <5   AST 16 16   ALKPHOS 93 94   BILITOT 0.3 0.4       Recent Labs     08/27/22  1130 08/28/22  0752   LABALBU 4.0 3.7       Ferritin   Date Value Ref Range Status   07/22/2020 1,612 ng/mL Final     Comment:     FERRITIN Reference Ranges:  Adult Males   20 - 60 years:    30 - 400 ng/mL  Adult females 16 - 60 years:    15 - 150 ng/mL  Adults greater than 60 years:   no established reference range  Pediatrics:                     no established reference range       Iron   Date Value Ref Range Status   07/22/2020 50 37 - 145 mcg/dL Final     TIBC   Date Value Ref Range Status   07/22/2020 290 250 - 450 mcg/dL Final       No results found for: Britt Callum    No results found for: FOLATE      Lab Results   Component Value Date/Time    COLORU Yellow 08/28/2022 08:00 AM    NITRU Negative 08/28/2022 08:00 AM    GLUCOSEU Negative 08/28/2022 08:00 AM    GLUCOSEU NEGATIVE 02/09/2011 01:55 PM    KETUA Negative 08/28/2022 08:00 AM    UROBILINOGEN 0.2 08/28/2022 08:00 AM    BILIRUBINUR Negative 08/28/2022 08:00 AM    BILIRUBINUR NEGATIVE 02/09/2011 01:55 PM       No results found for: KYLIE, CREURRAN, MACREATRATIO, OSMOU    No components found for: URIC    No results found for: LIPIDPAN      Assessment and Plan:    Esrd  Sp hd last night since she had dye  For hd as outpt mwf  Nonobtsructing left calculus     2. Diarrhea  H/o ibs  Ct unremarkable  Resolved  Eating well today    3. H/o avr    4.  Htn  Not requiring any meds  Follow    5. Bpd  Per im    Ok for [address]  Ruddy Osborne MD

## 2022-08-28 NOTE — DISCHARGE SUMMARY
Internal Medicine Progress Note     ABDIRIZAK=[facility]     Priyank Cano. Wyatt Childers., GEORGEOClaytonI. Crystal Barillas D.O., GEORGEO.I. Sherlean Blizzard, D.O. Jonny Lipscomb, MSN, APRN, NPJULIO Ledbetter Degree. Emeterio Sharma, [place], [id number] [facility]       Internal Medicine  Discharge Summary    NAME: Victorino East  :  1940  MRN:  [id number]  PCP: [facility]. ADMITTED: 2022      DISCHARGED: 22    ADMITTING PHYSICIAN: Priyank Anton DO    CONSULTANT(S):   IP CONSULT TO NEPHROLOGY  IP CONSULT TO CARDIOLOGY     ADMITTING DIAGNOSIS:   ESRD on hemodialysis ([facility] [address] [N18.6, Z99.2]     DISCHARGE DIAGNOSES:   Acute on end-stage renal disease on dialysis requiring ultrafiltration x1  Chronic troponin and BNP elevation secondary to #1  Chronic irritable bowel syndrome exacerbated by underlying anxiety disorder   Questionable UTI versus asymptomatic bacteriuria in a patient who is oliguric from end-stage renal disease   History of aortic valve replacement with bioprosthetic  Chronic stage II diastolic congestive heart failure  Mild to moderate mitral regurgitation  Mild to moderate tricuspid regurgitation  Hyperlipidemia  Hypertension  Bipolar disorder  Depression    BRIEF HISTORY OF PRESENT ILLNESS:   Patient is 43-year-old female presented to the ED with malaise. Patient has been experiencing diarrhea as well as nausea for the last day. She complains of dry heaves. Her appetite has diminished significantly. She admits to subjective fever and chills. She has chronic abdominal pain which has not changed. Patient was given IV contrast in the ED for CT abdomen and pelvis. She was in need of dialysis as such patient was admitted to the hospital after consultation with nephrology.        LABS[de-identified]  Lab Results   Component Value Date    WBC 14.1 (H) 2022    HGB 12.7 2022    HCT 39.4 2022     2022     2022    K 4.1 2022 CL 94 (L) 08/28/2022    CREATININE 4.3 (H) 08/28/2022    BUN 25 (H) 08/28/2022    CO2 29 08/28/2022    GLUCOSE 89 08/28/2022    ALT <5 08/28/2022    AST 16 08/28/2022    INR 1.0 07/22/2020     Lab Results   Component Value Date    INR 1.0 07/22/2020    INR 1.1 09/07/2014    INR 1.5 07/30/2014    PROTIME 11.7 07/22/2020    PROTIME 11.9 09/07/2014    PROTIME 16.3 (H) 07/30/2014      Lab Results   Component Value Date    TSH 3.510 08/28/2022     Lab Results   Component Value Date    TRIG 181 (H) 08/07/2019     Lab Results   Component Value Date    HDL 39 08/07/2019     Lab Results   Component Value Date    LDLCALC 100 (H) 08/07/2019     Lab Results   Component Value Date    LABA1C 5.4 07/14/2014       IMAGING:  CT ABDOMEN PELVIS W IV CONTRAST Additional Contrast? None    Result Date: 8/27/2022  EXAMINATION: CT OF THE ABDOMEN AND PELVIS WITH CONTRAST 8/27/2022 2:11 pm TECHNIQUE: CT of the abdomen and pelvis was performed with the administration of intravenous contrast. Multiplanar reformatted images are provided for review. Automated exposure control, iterative reconstruction, and/or weight based adjustment of the mA/kV was utilized to reduce the radiation dose to as low as reasonably achievable. COMPARISON: April 3, 2018 HISTORY: ORDERING SYSTEM PROVIDED HISTORY: epigastric pain, dialyxsis pt TECHNOLOGIST PROVIDED HISTORY: Reason for exam:->epigastric pain, dialyxsis pt Additional Contrast?->None Decision Support Exception - unselect if not a suspected or confirmed emergency medical condition->Emergency Medical Condition (MA) FINDINGS: No bowel obstruction, free air, or free fluid. The appendix is visualized and appears unremarkable. Multiple diverticula involving sigmoid colon. No evidence of acute diverticulitis. Limited visualization of the pelvis due to metallic artifact from left hip arthroplasty. Urinary bladder is grossly normal in contour. No hydronephrosis. There is atrophy of both kidneys. Nonobstructing 2 mm calculus involving left kidney. Multiple cysts associated with both kidneys measure up to 1.9 cm on the right and 1.2 cm on the left. No perinephric fat stranding. Liver is unremarkable. Gallbladder is unremarkable. No evidence of acute pancreatitis. Spleen is normal in size. Adrenal glands are normal.  No retroperitoneal lymphadenopathy. View of the lung bases shows no airspace opacity or pleural effusion. 1. No acute process in abdomen or pelvis. 2. Diverticulosis. 3. Atrophy of both kidneys with multiple bilateral renal cysts. 4. Nonobstructing 2 mm left renal calculus. HOSPITAL COURSE:   eJss Roberts was admitted last evening for volume removal via ultrafiltration due to recently missed dialysis. Patient states that she has longstanding irritable bowel syndrome that is highly influenced by her emotional state and anxiety. She had significant anxiety related to a birthday [place] that [place] was supposed [place] for her great-grandchildren with resultant diarrhea, nausea and dry heaving. ER work-up was rather benign aside from very mildly elevated lipase as well as chronic troponin and BNP elevation. Also noted was a urinalysis suggestive of infection versus asymptomatic bacteriuria in a patient who is oliguric in the setting of end-stage renal disease on hemodialysis. Of note, she is extremely anxious regarding use of any medications and is resistant to the institution of any new therapy and was only agreeable with hospitalization for last 24 hours to receive necessary dialysis treatment. She was ultrafiltrated at the direction of the nephrology team with good response and resolution of symptoms. She was able to tolerate fluids and food without difficulty. She is on regimen at home for anxiety, depression and sleep and is resistant to any changes.   She is offered referral to counseling and psychiatry but declined stating that she has a neighbor with whom she has a close relationship and she discusses her anxiety with her. Her chronic morbidities, laboratory values and vital signs have been monitored and addressed accordingly throughout hospitalization. She is absolutely symptom-free at this point including a lack of any cardiac or anginal symptomatology. She understands the importance of close follow-up, compliance with medication and treatment regimen as well as hemodialysis schedule which will be resumed on Monday. She is acceptable for discharge home as discussed. BRIEF PHYSICAL EXAMINATION AND LABORATORIES ON DAY OF DISCHARGE:  VITALS:  BP (!) 111/54   Pulse 76   Temp 98.1 °F (36.7 °C) (Oral)   Resp 16   Ht 5' 3\" (1.6 m)   Wt 115 lb 1.3 oz (52.2 kg)   SpO2 98%   BMI 20.39 kg/m²     HEENT:  PERRLA. EOMI. Sclera clear. Buccal mucosa moist.    Neck:  Supple. Trachea midline. No thyromegaly. No JVD. No bruits. Heart:  Rhythm regular, rate controlled. S1 and S2. Systolic murmur. Lungs:  Symmetrical.  Normal air exchange. Lungs are clear to auscultation bilaterally. No wheezes. No rhonchi. No rales. Abdomen: Soft. Non-tender. Non-distended. Bowel sounds positive. No organomegaly or masses. No pain on palpation    Extremities:  Peripheral pulses present. No significant pitting peripheral edema. No ulcers. Neurologic:  Alert x 3. No focal deficit. Cranial nerves grossly intact. Skin:  No petechia. No hemorrhage. No wounds. DISPOSITION:  The patient's condition is good. At this time the patient is without objective evidence of an acute process requiring continuing hospitalization or inpatient management. They are stable for discharge with outpatient follow-up. I have spoken with the patient and discussed the results of the current hospitalization, in addition to providing specific details for the plan of care and counseling regarding the diagnosis and prognosis.   The plan has been discussed in detail and they are aware of

## 2022-09-02 LAB — BLOOD CULTURE, ROUTINE: NORMAL

## 2022-12-21 ENCOUNTER — OFFICE VISIT (OUTPATIENT)
Dept: CARDIOLOGY CLINIC | Age: 82
End: 2022-12-21
Payer: MEDICARE

## 2022-12-21 VITALS
SYSTOLIC BLOOD PRESSURE: 126 MMHG | HEIGHT: 63 IN | DIASTOLIC BLOOD PRESSURE: 60 MMHG | WEIGHT: 121 LBS | RESPIRATION RATE: 20 BRPM | BODY MASS INDEX: 21.44 KG/M2 | HEART RATE: 75 BPM

## 2022-12-21 DIAGNOSIS — Z86.39 H/O HYPERLIPIDEMIA: ICD-10-CM

## 2022-12-21 DIAGNOSIS — I10 ESSENTIAL HYPERTENSION: ICD-10-CM

## 2022-12-21 DIAGNOSIS — I38 VHD (VALVULAR HEART DISEASE): Primary | ICD-10-CM

## 2022-12-21 DIAGNOSIS — I34.0 MITRAL VALVE INSUFFICIENCY, UNSPECIFIED ETIOLOGY: ICD-10-CM

## 2022-12-21 DIAGNOSIS — N18.6 ANEMIA DUE TO CHRONIC KIDNEY DISEASE, ON CHRONIC DIALYSIS (HCC): ICD-10-CM

## 2022-12-21 DIAGNOSIS — Z98.890 S/P MAZE OPERATION FOR ATRIAL FIBRILLATION: ICD-10-CM

## 2022-12-21 DIAGNOSIS — Z87.39 H/O: GOUT: ICD-10-CM

## 2022-12-21 DIAGNOSIS — Z95.2 S/P AVR: ICD-10-CM

## 2022-12-21 DIAGNOSIS — R77.8 ELEVATED TROPONIN: ICD-10-CM

## 2022-12-21 DIAGNOSIS — Z87.898 H/O SYNCOPE: ICD-10-CM

## 2022-12-21 DIAGNOSIS — N25.81 HYPERPARATHYROIDISM DUE TO RENAL INSUFFICIENCY (HCC): ICD-10-CM

## 2022-12-21 DIAGNOSIS — Z86.73 OLD CEREBELLAR INFARCT WITHOUT LATE EFFECT: ICD-10-CM

## 2022-12-21 DIAGNOSIS — D63.1 ANEMIA DUE TO CHRONIC KIDNEY DISEASE, ON CHRONIC DIALYSIS (HCC): ICD-10-CM

## 2022-12-21 DIAGNOSIS — I48.0 PAF (PAROXYSMAL ATRIAL FIBRILLATION) (HCC): ICD-10-CM

## 2022-12-21 DIAGNOSIS — F31.9 BIPOLAR AFFECTIVE DISORDER, REMISSION STATUS UNSPECIFIED (HCC): ICD-10-CM

## 2022-12-21 DIAGNOSIS — Q24.5 ANOMALOUS RIGHT CORONARY ARTERY: ICD-10-CM

## 2022-12-21 DIAGNOSIS — Z86.79 S/P MAZE OPERATION FOR ATRIAL FIBRILLATION: ICD-10-CM

## 2022-12-21 DIAGNOSIS — Z99.2 ESRD (END STAGE RENAL DISEASE) ON DIALYSIS (HCC): ICD-10-CM

## 2022-12-21 DIAGNOSIS — Z99.2 ANEMIA DUE TO CHRONIC KIDNEY DISEASE, ON CHRONIC DIALYSIS (HCC): ICD-10-CM

## 2022-12-21 DIAGNOSIS — N18.6 ESRD (END STAGE RENAL DISEASE) ON DIALYSIS (HCC): ICD-10-CM

## 2022-12-21 PROCEDURE — 3078F DIAST BP <80 MM HG: CPT | Performed by: INTERNAL MEDICINE

## 2022-12-21 PROCEDURE — 1090F PRES/ABSN URINE INCON ASSESS: CPT | Performed by: INTERNAL MEDICINE

## 2022-12-21 PROCEDURE — 1123F ACP DISCUSS/DSCN MKR DOCD: CPT | Performed by: INTERNAL MEDICINE

## 2022-12-21 PROCEDURE — G8420 CALC BMI NORM PARAMETERS: HCPCS | Performed by: INTERNAL MEDICINE

## 2022-12-21 PROCEDURE — G8427 DOCREV CUR MEDS BY ELIG CLIN: HCPCS | Performed by: INTERNAL MEDICINE

## 2022-12-21 PROCEDURE — 3074F SYST BP LT 130 MM HG: CPT | Performed by: INTERNAL MEDICINE

## 2022-12-21 PROCEDURE — 93000 ELECTROCARDIOGRAM COMPLETE: CPT | Performed by: INTERNAL MEDICINE

## 2022-12-21 PROCEDURE — G8484 FLU IMMUNIZE NO ADMIN: HCPCS | Performed by: INTERNAL MEDICINE

## 2022-12-21 PROCEDURE — 99214 OFFICE O/P EST MOD 30 MIN: CPT | Performed by: INTERNAL MEDICINE

## 2022-12-21 PROCEDURE — 1036F TOBACCO NON-USER: CPT | Performed by: INTERNAL MEDICINE

## 2022-12-21 PROCEDURE — G8399 PT W/DXA RESULTS DOCUMENT: HCPCS | Performed by: INTERNAL MEDICINE

## 2022-12-21 RX ORDER — DOXYCYCLINE HYCLATE 100 MG/1
100 CAPSULE ORAL 2 TIMES DAILY
COMMUNITY

## 2022-12-22 NOTE — PROGRESS NOTES
OFFICE VISIT        PRIMARY CARE PHYSICIAN:    No primary care provider on file. ALLERGIES / SENSITIVITIES:    Allergies   Allergen Reactions    Other      Pt states she cannot take any type of pain medication, she throws up and never stops      REVIEWED MEDICATIONS:      Current Outpatient Medications:     AMLODIPINE BESYLATE PO, Take 2.5 mg by mouth daily, Disp: , Rfl:     doxycycline hyclate (VIBRAMYCIN) 100 MG capsule, Take 100 mg by mouth 2 times daily, Disp: , Rfl:     Multiple Vitamins-Minerals (PRESERVISION AREDS 2 PO), Take by mouth, Disp: , Rfl:     Multiple Vitamins-Minerals (MULTI COMPLETE PO), Take by mouth, Disp: , Rfl:     valproic acid (DEPAKENE) 250 MG capsule, Take 500 mg by mouth nightly, Disp: , Rfl:     eszopiclone (LUNESTA) 2 MG TABS, Take 2 mg by mouth nightly as needed. , Disp: , Rfl:     aspirin 81 MG tablet, Take 81 mg by mouth daily, Disp: , Rfl:     imipramine (TOFRANIL) 50 MG tablet, Take 50 mg by mouth nightly., Disp: , Rfl:     valproic acid (DEPAKENE) 250 MG capsule, Take 250 mg by mouth every morning , Disp: , Rfl:     nitroGLYCERIN (NITROSTAT) 0.4 MG SL tablet, Place 0.4 mg under the tongue every 5 minutes as needed for Chest pain up to max of 3 total doses. 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 History of recurrent syncopal episodes (but none since 07/2020). Brigitte Hammond is a pleasant 82-year-old lady with cardiovascular history as described below. She also suffers from end-stage renal disease and is on hemodialysis 3 days a week. She was last seen by me in the office in 12/2021.   She was in the ER in 03/2022 for dizziness and a CT scan of the brain showed no acute intracranial abnormality with a small 4-mm chronic lacunar infarction in the right cerebellar hemisphere. She was discharged home. She was again hospitalized overnight from 08/27/2022 - 08/28/2022 when she presented with epigastric discomfort, diarrhea, lightheadedness and dizziness. Cardiology was asked to see her because of elevated troponin (which is chronically elevated) and no advanced cardiac testing was done (and appropriately so). Melody Gomez returns today for routine follow up. She states that she had a viral URI that started a month ago that is resolving but that she still complains of some congestion and cough with phlegm production. She denies fever or chills. She denies chest pain or dyspnea with her daily activities. She denies orthopnea, PNDs or lower extremity swelling. She denies palpitations or any recent syncopal episodes (as mentioned earlier). She does complain of being tired after her dialysis sessions. REVIEW OF SYSTEMS:    Constitutional: no fevers or chills or fatigue  HEENT: denies changes in hearing or vision, No difficulty swallowing  Endocrine: no weight changes  Musculoskeletal: no arthralgias or myalgias  Skin: denies rashes or itching or lesions  Heme/Lymph: denies bleeding  Heart: as above  Lungs: as above  GI: denies abdominal pain, vomiting, diarrhea or rectal bleeding or tarry stools  : denies hematuria, dysuria  Psych: denies mood changed, anxiety, depression. Neuro: denies numbness, tingling, tremors. CARDIOVASCULAR HISTORY:      Hypertension. Hyperlipidemia. Coronary artery disease:  a. 12/21/2010 Cardiac catheterization: No significant epicardial coronary artery disease. Systemic hypertension. Concentric left ventricular hypertrophy with hyperdynamic left ventricular systolic function. Elevated left ventricular end diastolic pressure. Trace aortic regurgitation.   b.  7/11/2014 Right and left heart catheterization:  Severe calcific aortic stenosis. Systemic hypertension. Patent coronary arteries. Patent carotid arteries. Normal right sided pressures. c.  Kait Gory nuclear stress test done on 8/7/2019 showed no evidence of fixed or reversible perfusion abnormality with normal wall motion and a calculated ejection fraction of 77%. Severe aortic stenosis:  a.  6/25/2014 Echocardiogram showed normal left ventricular size, wall motion and systolic function with Stage 1 left ventricular diastolic dysfunction. Normal right ventricular size and function. Moderate aortic valve sclerosis with severe aortic stenosis and mild aortic regurgitation. b.  Aortic valve mean gradient of 47 mmHg with an aortic valve area of 0.65 sq cm on cardiac catheterization on 7/11/2014. c.  Aortic valve replacement with a 21 mm [employer] Mosaic bioprosthesis tissue valve, 7/30/2014.        d.  Echocardiogram done on 03/17/2016 showed normal left ventricular size with borderline left ventricular hypertrophy with an ejection fraction of 75% with stage 1 left ventricular diastolic dysfunction. Normal right ventricular size and function. Mildly dilated left atrium. Mild mitral annular calcification with mild to moderate mitral regurgitation. Bioprosthesis in the aortic position, which is well seated, but the individual aortic leaflets were not well visualized with a calculated mean gradient across the aortic prosthesis of 25.25 mmHg with no aortic regurgitation. Sclerotic and calcified aortic root.    e.  Echocardiogram done on 3/12/2020 showed normal left ventricular size with borderline concentric left ventriclar hypertrophy with no regional wall motion abnormality with an estimated ejection fraction of 75-80% with stage 2 left ventricular diastolic dysfunction. Normal right ventricular size and function. Moderately dilated left atrium.   Focal calcification of the mitral valve leaflets with moderate mitral annular calcification without mitral stenosis and with mild to moderate mitral regurgitation. Mild prosthetic aortic valve, which appeared well seated with a mean gradient of 28.33 mmHg with no aortic regurgitation. Mild to moderate tricuspid regurgitation. Unroofing of anomalous right coronary artery at the time of the aortic valve surgery, 2014. Paroxysmal atrial fibrillation:  a. 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 Carotid ultrasound done on 2014 was read by Dr. Alf Garcia (Vascular surgery) as showing 30% stenosis on the right  and no stenosis on the left. 8.  Syncopal episodes:  Multifactorial.  9.  Small 4-mm chronic lacunar infarction in the right cerebellar hemisphere noted on CT scan of brain done on 2022 (patient presented with dizziness). PAST MEDICAL HISTORY:   As under cardiovascular history. Anemia. Chronic kidney disease (Stage 5) likely secondary to a hypertensive nephrosclerosis. a.   2008 Insertion of AV fistula, left arm.  b.   Secondary hyperparathyroidism. c.   Patient is on hemodialysis 3 days a week. 4.   Bipolar disorder. Psychosis. Anxiety. Depression. 5.   Gout. 6.   S/P partial hysterectomy (S/P 3 vaginal deliveries and 1 miscarriage). 7.   Left hip hemiarthroplasty on 2014 for left transcervical femoral neck displaced fracture, S/P fall on 2014.    8.   Hypercalcemia   9. 2015 screening colonoscopy and EGD with biopsy showed gastritis and mild sigmoid diverticulosis. 10. Hospitalized in 2018 with diarrhea and was diagnosed with colitis. FAMILY HISTORY:   Mother: , 80, complications from Alzheimers disease. Father: , 68, colon cancer. SOCIAL HISTORY:   Patient does not smoke. She does not drink alcohol.   since 2015    O:  COMPLETE PHYSICAL EXAM:      /60   Pulse 75   Resp 20   Ht 5' 3\" (1.6 m)   Wt 121 lb (54.9 kg)   BMI 21.43 kg/m²      General:           Well-developed, well-nourished lady in no distress. Head & Neck:   Atraumatic, normocephalic head. No JVD. No carotid bruits. Carotid upstrokes noted bilaterally. No thyromegaly. Sclerae not icteric. No xanthelasmas. Mucous membranes moist.  Chest:             Mid sternotomy scar well-healed. Symmetrical and nontender. No deformities. Symmetrical and tender. Lungs:             Clear to auscultation bilaterally. Heart:              Normal S1 and S2. No S3 or S4. Grade 2/6 systolic murmur heard at the apex. Grade 3/6 systolic murmur heard at the right upper sternal border. Abdomen:        Soft without organomegaly or masses, without organomegaly or masses. Normal bowel sounds. No bruits. Extremities:     No significant lower extremity edema. AV fistula left forearm with bruit and thrill. Skin:                Normal turgor. No rashes or ulcers noted. Neuro:             Oriented x 3. No motor or sensory deficits detected. REVIEW OF DIAGNOSTIC TESTS:    -Blood tests from 08/28/2022 reviewed in Epic:  Thyroid function tests normal.  HGB 12.7, HCT 39.4.  -EKG done today showed sinus rhythm with rate variation with nonspecific ST changes. ASSESSMENT / DIAGNOSIS:   1. Anomalous coronary artery, S/P unroofing of the right coronary artery during open heart surgery, 7/30/2014.    2.  S/p aortic valve replacement with a tissue prosthesis, 7/30/2014 with elevated gradient across the tissue prosthesis on the most recent echocardiogram in 3/2020.    3.  Paroxysmal atrial fibrillation, S/P modified Maze procedure and bipolar radiofrequency ablation procedure, 7/30/2014, patient is in sinus rhythm. 4.  End stage renal disease on hemodialysis through left wrist AV fistula, Tuesday's, Thursday's and Saturday's. 5.  Secondary hyperparathyroidism. 6.   History of anemia of chronic disease.    7. Hypertension: Adequately controlled. 8.  History of hyperlipidemia. 9.  Bipolar disorder. 10.  History of gout. 11.  Mild to moderate mitral regurgitation on echocardiogram in 3/2020. 12.  History of syncopal episodes:  Multi-factorial.  No recurrence since 7/2020. 13.  Small 4-mm chronic lacunar infarction in right cerebellar hemisphere noted on CT scan of brain in 03/2022. TREATMENT PLAN:  Reassure. Monitor blood pressure and heart rate:  Consider low-dose beta blocker therapy. Echocardiogram for follow up of valvular heart disease. Tentative follow up in one year or on prn basis pending the results of the echocardiogram.               [facility] CARDIOLOGY  BRICE Amaro [address]  [place] [id number]  [phone]

## 2023-01-09 ENCOUNTER — APPOINTMENT (OUTPATIENT)
Dept: GENERAL RADIOLOGY | Age: 83
DRG: 177 | End: 2023-01-09
Payer: MEDICARE

## 2023-01-09 ENCOUNTER — HOSPITAL ENCOUNTER (INPATIENT)
Age: 83
LOS: 2 days | Discharge: HOME | DRG: 177 | End: 2023-01-11
Attending: EMERGENCY MEDICINE | Admitting: INTERNAL MEDICINE
Payer: MEDICARE

## 2023-01-09 DIAGNOSIS — Z99.2 END STAGE RENAL DISEASE ON DIALYSIS (HCC): ICD-10-CM

## 2023-01-09 DIAGNOSIS — R53.1 GENERALIZED WEAKNESS: Primary | ICD-10-CM

## 2023-01-09 DIAGNOSIS — N18.6 END STAGE RENAL DISEASE ON DIALYSIS (HCC): ICD-10-CM

## 2023-01-09 PROBLEM — N17.9 ACUTE KIDNEY INJURY SUPERIMPOSED ON CHRONIC KIDNEY DISEASE (HCC): Status: ACTIVE | Noted: 2023-01-09

## 2023-01-09 PROBLEM — N18.9 ACUTE KIDNEY INJURY SUPERIMPOSED ON CHRONIC KIDNEY DISEASE (HCC): Status: ACTIVE | Noted: 2023-01-09

## 2023-01-09 LAB
ALBUMIN SERPL-MCNC: 3.8 G/DL (ref 3.5–5.2)
ALP BLD-CCNC: 99 U/L (ref 35–104)
ALT SERPL-CCNC: <5 U/L (ref 0–32)
ANION GAP SERPL CALCULATED.3IONS-SCNC: 19 MMOL/L (ref 7–16)
AST SERPL-CCNC: 11 U/L (ref 0–31)
BACTERIA: ABNORMAL /HPF
BASOPHILS ABSOLUTE: 0.02 E9/L (ref 0–0.2)
BASOPHILS RELATIVE PERCENT: 0.2 % (ref 0–2)
BILIRUB SERPL-MCNC: 0.3 MG/DL (ref 0–1.2)
BILIRUBIN URINE: NEGATIVE
BLOOD, URINE: NEGATIVE
BUN BLDV-MCNC: 81 MG/DL (ref 6–23)
CALCIUM SERPL-MCNC: 9.3 MG/DL (ref 8.6–10.2)
CHLORIDE BLD-SCNC: 98 MMOL/L (ref 98–107)
CLARITY: CLEAR
CO2: 22 MMOL/L (ref 22–29)
COLOR: YELLOW
CREAT SERPL-MCNC: 8.3 MG/DL (ref 0.5–1)
EKG ATRIAL RATE: 72 BPM
EKG P AXIS: 23 DEGREES
EKG P-R INTERVAL: 122 MS
EKG Q-T INTERVAL: 428 MS
EKG QRS DURATION: 78 MS
EKG QTC CALCULATION (BAZETT): 468 MS
EKG R AXIS: 28 DEGREES
EKG T AXIS: 78 DEGREES
EKG VENTRICULAR RATE: 72 BPM
EOSINOPHILS ABSOLUTE: 0.05 E9/L (ref 0.05–0.5)
EOSINOPHILS RELATIVE PERCENT: 0.4 % (ref 0–6)
GFR SERPL CREATININE-BSD FRML MDRD: 4 ML/MIN/1.73
GLUCOSE BLD-MCNC: 78 MG/DL (ref 74–99)
GLUCOSE URINE: NEGATIVE MG/DL
HCT VFR BLD CALC: 34.1 % (ref 34–48)
HEMOGLOBIN: 10.7 G/DL (ref 11.5–15.5)
IMMATURE GRANULOCYTES #: 0.1 E9/L
IMMATURE GRANULOCYTES %: 0.9 % (ref 0–5)
INFLUENZA A BY PCR: NOT DETECTED
INFLUENZA B BY PCR: NOT DETECTED
KETONES, URINE: NEGATIVE MG/DL
LACTIC ACID: 1.9 MMOL/L (ref 0.5–2.2)
LEUKOCYTE ESTERASE, URINE: ABNORMAL
LIPASE: 33 U/L (ref 13–60)
LYMPHOCYTES ABSOLUTE: 1.14 E9/L (ref 1.5–4)
LYMPHOCYTES RELATIVE PERCENT: 9.7 % (ref 20–42)
MCH RBC QN AUTO: 32.9 PG (ref 26–35)
MCHC RBC AUTO-ENTMCNC: 31.4 % (ref 32–34.5)
MCV RBC AUTO: 104.9 FL (ref 80–99.9)
MONOCYTES ABSOLUTE: 0.95 E9/L (ref 0.1–0.95)
MONOCYTES RELATIVE PERCENT: 8.1 % (ref 2–12)
NEUTROPHILS ABSOLUTE: 9.49 E9/L (ref 1.8–7.3)
NEUTROPHILS RELATIVE PERCENT: 80.7 % (ref 43–80)
NITRITE, URINE: NEGATIVE
PDW BLD-RTO: 15.3 FL (ref 11.5–15)
PH UA: 7.5 (ref 5–9)
PLATELET # BLD: 191 E9/L (ref 130–450)
PMV BLD AUTO: 10.8 FL (ref 7–12)
POTASSIUM SERPL-SCNC: 4.7 MMOL/L (ref 3.5–5)
PROTEIN UA: ABNORMAL MG/DL
RBC # BLD: 3.25 E12/L (ref 3.5–5.5)
RBC UA: ABNORMAL /HPF (ref 0–2)
SARS-COV-2, NAAT: NOT DETECTED
SODIUM BLD-SCNC: 139 MMOL/L (ref 132–146)
SPECIFIC GRAVITY UA: 1.01 (ref 1–1.03)
TOTAL PROTEIN: 6.3 G/DL (ref 6.4–8.3)
TROPONIN, HIGH SENSITIVITY: 77 NG/L (ref 0–9)
UROBILINOGEN, URINE: 0.2 E.U./DL
WBC # BLD: 11.8 E9/L (ref 4.5–11.5)
WBC UA: ABNORMAL /HPF (ref 0–5)

## 2023-01-09 PROCEDURE — 71045 X-RAY EXAM CHEST 1 VIEW: CPT

## 2023-01-09 PROCEDURE — 1200000000 HC SEMI PRIVATE

## 2023-01-09 PROCEDURE — 90935 HEMODIALYSIS ONE EVALUATION: CPT

## 2023-01-09 PROCEDURE — 84484 ASSAY OF TROPONIN QUANT: CPT

## 2023-01-09 PROCEDURE — 93010 ELECTROCARDIOGRAM REPORT: CPT | Performed by: INTERNAL MEDICINE

## 2023-01-09 PROCEDURE — 99285 EMERGENCY DEPT VISIT HI MDM: CPT

## 2023-01-09 PROCEDURE — 87502 INFLUENZA DNA AMP PROBE: CPT

## 2023-01-09 PROCEDURE — 6360000002 HC RX W HCPCS: Performed by: INTERNAL MEDICINE

## 2023-01-09 PROCEDURE — 83690 ASSAY OF LIPASE: CPT

## 2023-01-09 PROCEDURE — 87040 BLOOD CULTURE FOR BACTERIA: CPT

## 2023-01-09 PROCEDURE — 36415 COLL VENOUS BLD VENIPUNCTURE: CPT

## 2023-01-09 PROCEDURE — 6370000000 HC RX 637 (ALT 250 FOR IP): Performed by: INTERNAL MEDICINE

## 2023-01-09 PROCEDURE — 80053 COMPREHEN METABOLIC PANEL: CPT

## 2023-01-09 PROCEDURE — 80164 ASSAY DIPROPYLACETIC ACD TOT: CPT

## 2023-01-09 PROCEDURE — 85025 COMPLETE CBC W/AUTO DIFF WBC: CPT

## 2023-01-09 PROCEDURE — 93005 ELECTROCARDIOGRAM TRACING: CPT | Performed by: EMERGENCY MEDICINE

## 2023-01-09 PROCEDURE — 87635 SARS-COV-2 COVID-19 AMP PRB: CPT

## 2023-01-09 PROCEDURE — 5A1D70Z PERFORMANCE OF URINARY FILTRATION, INTERMITTENT, LESS THAN 6 HOURS PER DAY: ICD-10-PCS | Performed by: INTERNAL MEDICINE

## 2023-01-09 PROCEDURE — 83605 ASSAY OF LACTIC ACID: CPT

## 2023-01-09 PROCEDURE — 87088 URINE BACTERIA CULTURE: CPT

## 2023-01-09 PROCEDURE — 81001 URINALYSIS AUTO W/SCOPE: CPT

## 2023-01-09 RX ORDER — SODIUM CHLORIDE 0.9 % (FLUSH) 0.9 %
5-40 SYRINGE (ML) INJECTION EVERY 12 HOURS SCHEDULED
Status: DISCONTINUED | OUTPATIENT
Start: 2023-01-09 | End: 2023-01-11 | Stop reason: HOSPADM

## 2023-01-09 RX ORDER — ACETAMINOPHEN 650 MG/1
650 SUPPOSITORY RECTAL EVERY 6 HOURS PRN
Status: DISCONTINUED | OUTPATIENT
Start: 2023-01-09 | End: 2023-01-11 | Stop reason: HOSPADM

## 2023-01-09 RX ORDER — SODIUM CHLORIDE 9 MG/ML
INJECTION, SOLUTION INTRAVENOUS PRN
Status: DISCONTINUED | OUTPATIENT
Start: 2023-01-09 | End: 2023-01-11 | Stop reason: HOSPADM

## 2023-01-09 RX ORDER — ACETAMINOPHEN 325 MG/1
650 TABLET ORAL EVERY 6 HOURS PRN
Status: DISCONTINUED | OUTPATIENT
Start: 2023-01-09 | End: 2023-01-11 | Stop reason: HOSPADM

## 2023-01-09 RX ORDER — VALPROIC ACID 250 MG/1
500 CAPSULE, LIQUID FILLED ORAL NIGHTLY
Status: DISCONTINUED | OUTPATIENT
Start: 2023-01-09 | End: 2023-01-11 | Stop reason: HOSPADM

## 2023-01-09 RX ORDER — ONDANSETRON 2 MG/ML
4 INJECTION INTRAMUSCULAR; INTRAVENOUS EVERY 6 HOURS PRN
Status: DISCONTINUED | OUTPATIENT
Start: 2023-01-09 | End: 2023-01-11 | Stop reason: HOSPADM

## 2023-01-09 RX ORDER — ASPIRIN 81 MG/1
81 TABLET ORAL DAILY
Status: DISCONTINUED | OUTPATIENT
Start: 2023-01-09 | End: 2023-01-11 | Stop reason: HOSPADM

## 2023-01-09 RX ORDER — ONDANSETRON 4 MG/1
4 TABLET, ORALLY DISINTEGRATING ORAL EVERY 8 HOURS PRN
Status: DISCONTINUED | OUTPATIENT
Start: 2023-01-09 | End: 2023-01-11 | Stop reason: HOSPADM

## 2023-01-09 RX ORDER — IMIPRAMINE HCL 25 MG
50 TABLET ORAL NIGHTLY
Status: DISCONTINUED | OUTPATIENT
Start: 2023-01-09 | End: 2023-01-11 | Stop reason: HOSPADM

## 2023-01-09 RX ORDER — POLYETHYLENE GLYCOL 3350 17 G/17G
17 POWDER, FOR SOLUTION ORAL DAILY PRN
Status: DISCONTINUED | OUTPATIENT
Start: 2023-01-09 | End: 2023-01-11 | Stop reason: HOSPADM

## 2023-01-09 RX ORDER — ZOLPIDEM TARTRATE 5 MG/1
5 TABLET ORAL NIGHTLY PRN
Status: DISCONTINUED | OUTPATIENT
Start: 2023-01-09 | End: 2023-01-11 | Stop reason: HOSPADM

## 2023-01-09 RX ORDER — VALPROIC ACID 250 MG/1
250 CAPSULE, LIQUID FILLED ORAL EVERY MORNING
Status: DISCONTINUED | OUTPATIENT
Start: 2023-01-10 | End: 2023-01-11 | Stop reason: HOSPADM

## 2023-01-09 RX ORDER — SODIUM CHLORIDE 0.9 % (FLUSH) 0.9 %
5-40 SYRINGE (ML) INJECTION PRN
Status: DISCONTINUED | OUTPATIENT
Start: 2023-01-09 | End: 2023-01-11 | Stop reason: HOSPADM

## 2023-01-09 RX ORDER — HEPARIN SODIUM 5000 [USP'U]/ML
5000 INJECTION, SOLUTION INTRAVENOUS; SUBCUTANEOUS EVERY 8 HOURS SCHEDULED
Status: DISCONTINUED | OUTPATIENT
Start: 2023-01-09 | End: 2023-01-11 | Stop reason: HOSPADM

## 2023-01-09 RX ORDER — AMLODIPINE BESYLATE 5 MG/1
2.5 TABLET ORAL DAILY
Status: DISCONTINUED | OUTPATIENT
Start: 2023-01-09 | End: 2023-01-11 | Stop reason: HOSPADM

## 2023-01-09 RX ADMIN — IMIPRAMINE HYDROCHLORIDE 50 MG: 25 TABLET ORAL at 22:39

## 2023-01-09 RX ADMIN — VALPROIC ACID 500 MG: 250 CAPSULE, LIQUID FILLED ORAL at 22:39

## 2023-01-09 RX ADMIN — HEPARIN SODIUM 5000 UNITS: 5000 INJECTION INTRAVENOUS; SUBCUTANEOUS at 22:40

## 2023-01-09 RX ADMIN — ZOLPIDEM TARTRATE 5 MG: 5 TABLET ORAL at 22:39

## 2023-01-09 ASSESSMENT — PAIN - FUNCTIONAL ASSESSMENT: PAIN_FUNCTIONAL_ASSESSMENT: NONE - DENIES PAIN

## 2023-01-09 ASSESSMENT — ENCOUNTER SYMPTOMS
NAUSEA: 0
SHORTNESS OF BREATH: 0
VOMITING: 0
CONSTIPATION: 0
DIARRHEA: 0
ABDOMINAL PAIN: 0

## 2023-01-09 NOTE — ED NOTES
Pt signed consent to receive HD here in hospital while inpatient.       Savannah Green  01/09/23 153

## 2023-01-09 NOTE — ED PROVIDER NOTES
Patient provides the history. She states following her hemodialysis on Friday she was very weak and the  had to assist her to her home. She states she stayed in bed most of the weekend and her neighbor and grandson checked on her. She states she did get up a few times to get food. No CP, SOB, or abdominal pain. She admits to URI symptoms that have been present for a month. The history is provided by the patient. Fatigue  Severity:  Moderate  Onset quality:  Gradual  Duration:  3 days  Timing:  Constant  Progression:  Unchanged  Chronicity:  New  Associated symptoms: no abdominal pain, no chest pain, no diarrhea, no nausea, no shortness of breath and no vomiting      Review of Systems   Constitutional:  Positive for activity change and fatigue. Respiratory:  Negative for shortness of breath. Cardiovascular:  Negative for chest pain. Gastrointestinal:  Negative for abdominal pain, constipation, diarrhea, nausea and vomiting. Genitourinary:  Negative for decreased urine volume and flank pain. Skin:  Negative for pallor. Neurological:  Positive for weakness (generalized). Negative for light-headedness and numbness. Hematological: Negative. Psychiatric/Behavioral: Negative. Physical Exam  Vitals and nursing note reviewed. Constitutional:       General: She is not in acute distress. Appearance: Normal appearance. She is well-developed and normal weight. She is not ill-appearing, toxic-appearing or diaphoretic. HENT:      Head: Normocephalic and atraumatic. Nose: Nose normal.      Mouth/Throat:      Mouth: Mucous membranes are moist.      Pharynx: Oropharynx is clear. Eyes:      Extraocular Movements: Extraocular movements intact. Conjunctiva/sclera: Conjunctivae normal.      Pupils: Pupils are equal, round, and reactive to light. Cardiovascular:      Rate and Rhythm: Normal rate and regular rhythm. Pulses: Normal pulses.       Heart sounds: Normal heart sounds. No murmur heard. Pulmonary:      Effort: Pulmonary effort is normal. No respiratory distress. Breath sounds: Normal breath sounds. No wheezing or rales. Abdominal:      General: Bowel sounds are normal.      Palpations: Abdomen is soft. Tenderness: There is no abdominal tenderness. There is no guarding or rebound. Musculoskeletal:         General: No tenderness. Normal range of motion. Cervical back: Normal range of motion and neck supple. No rigidity. Right lower leg: No edema. Left lower leg: No edema. Lymphadenopathy:      Cervical: No cervical adenopathy. Skin:     General: Skin is warm and dry. Capillary Refill: Capillary refill takes less than 2 seconds. Coloration: Skin is not pale. Neurological:      General: No focal deficit present. Mental Status: She is alert and oriented to person, place, and time. Mental status is at baseline. Cranial Nerves: No cranial nerve deficit. Coordination: Coordination normal.   Psychiatric:         Mood and Affect: Mood normal.         Thought Content: Thought content normal.         Judgment: Judgment normal.       Procedures    Medical Decision Making  Amount and/or Complexity of Data Reviewed  Labs: ordered. Radiology: ordered. ECG/medicine tests: ordered. Risk  Decision regarding hospitalization. Patient presents emergency department with concern for generalized weakness and difficulty ambulating. Patient will be admitted to the hospital for dialysis and will be seen by the  for possible inpatient therapy. Patient had lab work that was done and does not indicate any acute pathology at this time. Patient's physical exam does not indicate that there has been any stroke. ED Course as of 01/11/23 0902   Mon Jan 09, 2023   1503 I have discussed the results with the patient. I have a call placed to consult nephrology for dialysis.   Patient wishes to have inpatient physical therapy to build up her strength. She agrees she does not feel safe at home. [JS]      ED Course User Index  [JS] Ashvin Talbot DO     EKG Interpretation    Interpreted by emergency department physician    Rhythm: normal sinus   Rate: normal  Axis: normal  Ectopy: none  Conduction: normal  ST Segments: no acute change  T Waves: no acute change  Q Waves: none    Clinical Impression: no acute changes    Ashvin Talbot DO  ED Course as of 01/11/23 0902   Mon Jan 09, 2023   1503 I have discussed the results with the patient. I have a call placed to consult nephrology for dialysis. Patient wishes to have inpatient physical therapy to build up her strength. She agrees she does not feel safe at home. [JS]      ED Course User Index  [JS] Ashvin Talbot DO       --------------------------------------------- PAST HISTORY ---------------------------------------------  Past Medical History:  has a past medical history of Anemia, Aortic stenosis, severe, Arthritis, Bipolar disorder ([address] CAD (coronary artery disease), Carotid bruit, Chronic kidney disease, CRF (chronic renal failure), Depression, Gout, Hemodialysis patient ([address] Hyperlipidemia, Hypertension, Left displaced femoral neck fracture ([address] Murmur, heart, Psychosis ([address] Secondary hyperparathyroidism ([address] and Shingles. Past Surgical History:  has a past surgical history that includes AV fistula repair (4-30-08); Dialysis fistula creation (1-8-08); Hysterectomy; Diagnostic Cardiac Cath Lab Procedure (12/21/2010); transthoracic echocardiogram (6/19/2012); Colonoscopy (3/15); Aortic valve replacement (8/5/14); fracture surgery (9/8/14); and Upper gastrointestinal endoscopy (3/15). Social History:  reports that she has never smoked. She has never used smokeless tobacco. She reports that she does not currently use alcohol. She reports that she does not use drugs. Family History: family history is not on file.      The patients home medications have been reviewed. Allergies:  Other    -------------------------------------------------- RESULTS -------------------------------------------------    Lab  Results for orders placed or performed during the hospital encounter of 01/09/23   RAPID INFLUENZA A/B ANTIGENS    Specimen: Nasopharyngeal   Result Value Ref Range    Influenza A by PCR Not Detected Not Detected    Influenza B by PCR Not Detected Not Detected   COVID-19, Rapid    Specimen: Nasopharyngeal Swab   Result Value Ref Range    SARS-CoV-2, NAAT Not Detected Not Detected   Culture, Blood 1    Specimen: Blood   Result Value Ref Range    Blood Culture, Routine 24 Hours no growth    Culture, Blood 2    Specimen: Blood   Result Value Ref Range    Culture, Blood 2 24 Hours no growth    Respiratory Panel, Molecular, with COVID-19 (Restricted: peds pts or suitable admitted adults)    Specimen: Nasopharyngeal   Result Value Ref Range    Adenovirus by PCR Not Detected Not Detected    Bordetella parapertussis by PCR Not Detected Not Detected    Bordetella pertussis by PCR Not Detected Not Detected    Chlamydophilia pneumoniae by PCR Not Detected Not Detected    Coronavirus 229E by PCR Not Detected Not Detected    Coronavirus HKU1 by PCR Not Detected Not Detected    Coronavirus NL63 by PCR Not Detected Not Detected    Coronavirus OC43 by PCR Not Detected Not Detected    SARS-CoV-2, PCR DETECTED (A) Not Detected    Human Metapneumovirus by PCR Not Detected Not Detected    Human Rhinovirus/Enterovirus by PCR Not Detected Not Detected    Influenza A by PCR Not Detected Not Detected    Influenza B by PCR Not Detected Not Detected    Mycoplasma pneumoniae by PCR Not Detected Not Detected    Parainfluenza Virus 1 by PCR Not Detected Not Detected    Parainfluenza Virus 2 by PCR Not Detected Not Detected    Parainfluenza Virus 3 by PCR Not Detected Not Detected    Parainfluenza Virus 4 by PCR Not Detected Not Detected    Respiratory Syncytial Virus by PCR Not Detected Not Detected   CBC with Auto Differential   Result Value Ref Range    WBC 11.8 (H) 4.5 - 11.5 E9/L    RBC 3.25 (L) 3.50 - 5.50 E12/L    Hemoglobin 10.7 (L) 11.5 - 15.5 g/dL    Hematocrit 34.1 34.0 - 48.0 %    .9 (H) 80.0 - 99.9 fL    MCH 32.9 26.0 - 35.0 pg    MCHC 31.4 (L) 32.0 - 34.5 %    RDW 15.3 (H) 11.5 - 15.0 fL    Platelets 420 792 - 934 E9/L    MPV 10.8 7.0 - 12.0 fL    Neutrophils % 80.7 (H) 43.0 - 80.0 %    Immature Granulocytes % 0.9 0.0 - 5.0 %    Lymphocytes % 9.7 (L) 20.0 - 42.0 %    Monocytes % 8.1 2.0 - 12.0 %    Eosinophils % 0.4 0.0 - 6.0 %    Basophils % 0.2 0.0 - 2.0 %    Neutrophils Absolute 9.49 (H) 1.80 - 7.30 E9/L    Immature Granulocytes # 0.10 E9/L    Lymphocytes Absolute 1.14 (L) 1.50 - 4.00 E9/L    Monocytes Absolute 0.95 0.10 - 0.95 E9/L    Eosinophils Absolute 0.05 0.05 - 0.50 E9/L    Basophils Absolute 0.02 0.00 - 0.20 E9/L   Comprehensive Metabolic Panel   Result Value Ref Range    Sodium 139 132 - 146 mmol/L    Potassium 4.7 3.5 - 5.0 mmol/L    Chloride 98 98 - 107 mmol/L    CO2 22 22 - 29 mmol/L    Anion Gap 19 (H) 7 - 16 mmol/L    Glucose 78 74 - 99 mg/dL    BUN 81 (H) 6 - 23 mg/dL    Creatinine 8.3 (HH) 0.5 - 1.0 mg/dL    Est, Glom Filt Rate 4 >=60 mL/min/1.73    Calcium 9.3 8.6 - 10.2 mg/dL    Total Protein 6.3 (L) 6.4 - 8.3 g/dL    Albumin 3.8 3.5 - 5.2 g/dL    Total Bilirubin 0.3 0.0 - 1.2 mg/dL    Alkaline Phosphatase 99 35 - 104 U/L    ALT <5 0 - 32 U/L    AST 11 0 - 31 U/L   Lipase   Result Value Ref Range    Lipase 33 13 - 60 U/L   Lactic Acid   Result Value Ref Range    Lactic Acid 1.9 0.5 - 2.2 mmol/L   Troponin   Result Value Ref Range    Troponin, High Sensitivity 77 (H) 0 - 9 ng/L   Urinalysis with Microscopic   Result Value Ref Range    Color, UA Yellow Straw/Yellow    Clarity, UA Clear Clear    Glucose, Ur Negative Negative mg/dL    Bilirubin Urine Negative Negative    Ketones, Urine Negative Negative mg/dL    Specific Gravity, UA 1.010 1.005 - 1. 030    Blood, Urine Negative Negative    pH, UA 7.5 5.0 - 9.0    Protein, UA TRACE Negative mg/dL    Urobilinogen, Urine 0.2 <2.0 E.U./dL    Nitrite, Urine Negative Negative    Leukocyte Esterase, Urine SMALL (A) Negative    WBC, UA 10-20 (A) 0 - 5 /HPF    RBC, UA 1-3 0 - 2 /HPF    Bacteria, UA FEW (A) None Seen /HPF   Valproic Acid Level, Total   Result Value Ref Range    Valproic Acid Lvl 25 (L) 50 - 100 mcg/mL   TSH   Result Value Ref Range    TSH 2.470 0.270 - 4.200 uIU/mL   T4, Free   Result Value Ref Range    T4 Free 1.10 0.93 - 1.70 ng/dL   Phosphorus   Result Value Ref Range    Phosphorus 4.8 (H) 2.5 - 4.5 mg/dL   Hepatic Function Panel   Result Value Ref Range    Total Protein 6.0 (L) 6.4 - 8.3 g/dL    Albumin 3.5 3.5 - 5.2 g/dL    Alkaline Phosphatase 90 35 - 104 U/L    ALT <5 0 - 32 U/L    AST 11 0 - 31 U/L    Total Bilirubin 0.4 0.0 - 1.2 mg/dL    Bilirubin, Direct <0.2 0.0 - 0.3 mg/dL    Bilirubin, Indirect see below 0.0 - 1.0 mg/dL   Lipid Panel   Result Value Ref Range    Cholesterol, Total 155 0 - 199 mg/dL    Triglycerides 200 (H) 0 - 149 mg/dL    HDL 37 >40 mg/dL    LDL Calculated 78 0 - 99 mg/dL    VLDL Cholesterol Calculated 40 mg/dL   Magnesium   Result Value Ref Range    Magnesium 2.0 1.6 - 2.6 mg/dL   Hemoglobin A1C   Result Value Ref Range    Hemoglobin A1C 4.5 4.0 - 5.6 %   CBC with Auto Differential   Result Value Ref Range    WBC 12.1 (H) 4.5 - 11.5 E9/L    RBC 3.24 (L) 3.50 - 5.50 E12/L    Hemoglobin 11.0 (L) 11.5 - 15.5 g/dL    Hematocrit 32.9 (L) 34.0 - 48.0 %    .5 (H) 80.0 - 99.9 fL    MCH 34.0 26.0 - 35.0 pg    MCHC 33.4 32.0 - 34.5 %    RDW 15.3 (H) 11.5 - 15.0 fL    Platelets 705 599 - 451 E9/L    MPV 11.0 7.0 - 12.0 fL    Neutrophils % 77.3 43.0 - 80.0 %    Immature Granulocytes % 0.7 0.0 - 5.0 %    Lymphocytes % 10.4 (L) 20.0 - 42.0 %    Monocytes % 11.2 2.0 - 12.0 %    Eosinophils % 0.2 0.0 - 6.0 %    Basophils % 0.2 0.0 - 2.0 %    Neutrophils Absolute 9.37 (H) 1.80 - 7.30 E9/L    Immature Granulocytes # 0.08 E9/L    Lymphocytes Absolute 1.26 (L) 1.50 - 4.00 E9/L    Monocytes Absolute 1.36 (H) 0.10 - 0.95 E9/L    Eosinophils Absolute 0.03 (L) 0.05 - 0.50 E9/L    Basophils Absolute 0.03 0.00 - 0.20 Q0/P   Basic Metabolic Panel   Result Value Ref Range    Sodium 136 132 - 146 mmol/L    Potassium 3.7 3.5 - 5.0 mmol/L    Chloride 94 (L) 98 - 107 mmol/L    CO2 27 22 - 29 mmol/L    Anion Gap 15 7 - 16 mmol/L    Glucose 79 74 - 99 mg/dL    BUN 28 (H) 6 - 23 mg/dL    Creatinine 4.2 (H) 0.5 - 1.0 mg/dL    Est, Glom Filt Rate 10 >=60 mL/min/1.73    Calcium 8.9 8.6 - 10.2 mg/dL   EKG 12 Lead   Result Value Ref Range    Ventricular Rate 72 BPM    Atrial Rate 72 BPM    P-R Interval 122 ms    QRS Duration 78 ms    Q-T Interval 428 ms    QTc Calculation (Bazett) 468 ms    P Axis 23 degrees    R Axis 28 degrees    T Axis 78 degrees       Radiology  XR CHEST PORTABLE   Final Result   No evidence of pneumonia or pleural effusion.             ------------------------- NURSING NOTES AND VITALS REVIEWED ---------------------------  Date / Time Roomed:  1/9/2023 12:43 PM  ED Bed Assignment:  [id number]    The nursing notes within the ED encounter and vital signs as below have been reviewed. Patient Vitals for the past 24 hrs:   BP Temp Temp src Pulse Resp SpO2   01/11/23 0813 (!) 140/80 98.4 °F (36.9 °C) Oral 80 18 97 %   01/10/23 1955 (!) 140/60 98.4 °F (36.9 °C) -- 80 17 98 %   01/10/23 1611 132/73 98.9 °F (37.2 °C) Oral 86 18 99 %       Oxygen Saturation Interpretation: Normal      ------------------------------------------ PROGRESS NOTES ------------------------------------------  Re-evaluation(s):      I have spoken with the patient and discussed todays results, in addition to providing specific details for the plan of care and counseling regarding the diagnosis and prognosis.   Their questions are answered at this time and they are agreeable with the plan.      --------------------------------- ADDITIONAL PROVIDER NOTES ---------------------------------  Consultations:  Spoke with Dr. Michoacano Carpenter and Axel Standing for Dr. Dwight Leon,  They will admit this patient. This patient's ED course included: a personal history and physicial examination, multiple bedside re-evaluations, and IV medications    This patient has remained hemodynamically stable and been closely monitored during their ED course. Please note that the withdrawal or failure to initiate urgent interventions for this patient would likely result in a life threatening deterioration or permanent disability. Accordingly this patient received 0 minutes of critical care time, excluding separately billable procedures. Clinical Impression  1. Generalized weakness    2. End stage renal disease on dialysis [facility])          Disposition  Patient's disposition: Admit to telemetry  Patient's condition is stable.        Nitin Sanchez DO  01/11/23 3597

## 2023-01-09 NOTE — ED NOTES
[facility] 313-2 after treatment.       Jennyfer Rodriguez  01/09/23 2644

## 2023-01-09 NOTE — ED NOTES
Pt ambulated to restroom with minimal assistance, steady on feet. Dr. Alanna Grant aware of critical creat.      Lynn Loges  01/09/23 3399

## 2023-01-09 NOTE — ED NOTES
Nurse to nurse given to hospitals. Pt to HD and then to 3rd floor for admission.       Michael Segura  01/09/23 1300 [facility]  01/09/23 9047

## 2023-01-10 PROBLEM — N18.6 ESRD (END STAGE RENAL DISEASE) (HCC): Status: ACTIVE | Noted: 2023-01-10

## 2023-01-10 LAB
ADENOVIRUS BY PCR: NOT DETECTED
ALBUMIN SERPL-MCNC: 3.5 G/DL (ref 3.5–5.2)
ALP BLD-CCNC: 90 U/L (ref 35–104)
ALT SERPL-CCNC: <5 U/L (ref 0–32)
ANION GAP SERPL CALCULATED.3IONS-SCNC: 15 MMOL/L (ref 7–16)
AST SERPL-CCNC: 11 U/L (ref 0–31)
BASOPHILS ABSOLUTE: 0.03 E9/L (ref 0–0.2)
BASOPHILS RELATIVE PERCENT: 0.2 % (ref 0–2)
BILIRUB SERPL-MCNC: 0.4 MG/DL (ref 0–1.2)
BILIRUBIN DIRECT: <0.2 MG/DL (ref 0–0.3)
BILIRUBIN, INDIRECT: ABNORMAL MG/DL (ref 0–1)
BORDETELLA PARAPERTUSSIS BY PCR: NOT DETECTED
BORDETELLA PERTUSSIS BY PCR: NOT DETECTED
BUN BLDV-MCNC: 28 MG/DL (ref 6–23)
CALCIUM SERPL-MCNC: 8.9 MG/DL (ref 8.6–10.2)
CHLAMYDOPHILIA PNEUMONIAE BY PCR: NOT DETECTED
CHLORIDE BLD-SCNC: 94 MMOL/L (ref 98–107)
CHOLESTEROL, TOTAL: 155 MG/DL (ref 0–199)
CO2: 27 MMOL/L (ref 22–29)
CORONAVIRUS 229E BY PCR: NOT DETECTED
CORONAVIRUS HKU1 BY PCR: NOT DETECTED
CORONAVIRUS NL63 BY PCR: NOT DETECTED
CORONAVIRUS OC43 BY PCR: NOT DETECTED
CREAT SERPL-MCNC: 4.2 MG/DL (ref 0.5–1)
EOSINOPHILS ABSOLUTE: 0.03 E9/L (ref 0.05–0.5)
EOSINOPHILS RELATIVE PERCENT: 0.2 % (ref 0–6)
GFR SERPL CREATININE-BSD FRML MDRD: 10 ML/MIN/1.73
GLUCOSE BLD-MCNC: 79 MG/DL (ref 74–99)
HBA1C MFR BLD: 4.5 % (ref 4–5.6)
HCT VFR BLD CALC: 32.9 % (ref 34–48)
HDLC SERPL-MCNC: 37 MG/DL
HEMOGLOBIN: 11 G/DL (ref 11.5–15.5)
HUMAN METAPNEUMOVIRUS BY PCR: NOT DETECTED
HUMAN RHINOVIRUS/ENTEROVIRUS BY PCR: NOT DETECTED
IMMATURE GRANULOCYTES #: 0.08 E9/L
IMMATURE GRANULOCYTES %: 0.7 % (ref 0–5)
INFLUENZA A BY PCR: NOT DETECTED
INFLUENZA B BY PCR: NOT DETECTED
LDL CHOLESTEROL CALCULATED: 78 MG/DL (ref 0–99)
LYMPHOCYTES ABSOLUTE: 1.26 E9/L (ref 1.5–4)
LYMPHOCYTES RELATIVE PERCENT: 10.4 % (ref 20–42)
MAGNESIUM: 2 MG/DL (ref 1.6–2.6)
MCH RBC QN AUTO: 34 PG (ref 26–35)
MCHC RBC AUTO-ENTMCNC: 33.4 % (ref 32–34.5)
MCV RBC AUTO: 101.5 FL (ref 80–99.9)
MONOCYTES ABSOLUTE: 1.36 E9/L (ref 0.1–0.95)
MONOCYTES RELATIVE PERCENT: 11.2 % (ref 2–12)
MYCOPLASMA PNEUMONIAE BY PCR: NOT DETECTED
NEUTROPHILS ABSOLUTE: 9.37 E9/L (ref 1.8–7.3)
NEUTROPHILS RELATIVE PERCENT: 77.3 % (ref 43–80)
PARAINFLUENZA VIRUS 1 BY PCR: NOT DETECTED
PARAINFLUENZA VIRUS 2 BY PCR: NOT DETECTED
PARAINFLUENZA VIRUS 3 BY PCR: NOT DETECTED
PARAINFLUENZA VIRUS 4 BY PCR: NOT DETECTED
PDW BLD-RTO: 15.3 FL (ref 11.5–15)
PHOSPHORUS: 4.8 MG/DL (ref 2.5–4.5)
PLATELET # BLD: 174 E9/L (ref 130–450)
PMV BLD AUTO: 11 FL (ref 7–12)
POTASSIUM SERPL-SCNC: 3.7 MMOL/L (ref 3.5–5)
RBC # BLD: 3.24 E12/L (ref 3.5–5.5)
RESPIRATORY SYNCYTIAL VIRUS BY PCR: NOT DETECTED
SARS-COV-2, PCR: DETECTED
SODIUM BLD-SCNC: 136 MMOL/L (ref 132–146)
T4 FREE: 1.1 NG/DL (ref 0.93–1.7)
TOTAL PROTEIN: 6 G/DL (ref 6.4–8.3)
TRIGL SERPL-MCNC: 200 MG/DL (ref 0–149)
TSH SERPL DL<=0.05 MIU/L-ACNC: 2.47 UIU/ML (ref 0.27–4.2)
VALPROIC ACID LEVEL: 25 MCG/ML (ref 50–100)
VLDLC SERPL CALC-MCNC: 40 MG/DL
WBC # BLD: 12.1 E9/L (ref 4.5–11.5)

## 2023-01-10 PROCEDURE — 6360000002 HC RX W HCPCS: Performed by: INTERNAL MEDICINE

## 2023-01-10 PROCEDURE — 80076 HEPATIC FUNCTION PANEL: CPT

## 2023-01-10 PROCEDURE — 1200000000 HC SEMI PRIVATE

## 2023-01-10 PROCEDURE — 6370000000 HC RX 637 (ALT 250 FOR IP): Performed by: INTERNAL MEDICINE

## 2023-01-10 PROCEDURE — 2580000003 HC RX 258: Performed by: INTERNAL MEDICINE

## 2023-01-10 PROCEDURE — 85025 COMPLETE CBC W/AUTO DIFF WBC: CPT

## 2023-01-10 PROCEDURE — 0202U NFCT DS 22 TRGT SARS-COV-2: CPT

## 2023-01-10 PROCEDURE — 84100 ASSAY OF PHOSPHORUS: CPT

## 2023-01-10 PROCEDURE — 80061 LIPID PANEL: CPT

## 2023-01-10 PROCEDURE — 83735 ASSAY OF MAGNESIUM: CPT

## 2023-01-10 PROCEDURE — 84443 ASSAY THYROID STIM HORMONE: CPT

## 2023-01-10 PROCEDURE — 97161 PT EVAL LOW COMPLEX 20 MIN: CPT | Performed by: PHYSICAL THERAPIST

## 2023-01-10 PROCEDURE — 80048 BASIC METABOLIC PNL TOTAL CA: CPT

## 2023-01-10 PROCEDURE — 84439 ASSAY OF FREE THYROXINE: CPT

## 2023-01-10 PROCEDURE — 83036 HEMOGLOBIN GLYCOSYLATED A1C: CPT

## 2023-01-10 PROCEDURE — 36415 COLL VENOUS BLD VENIPUNCTURE: CPT

## 2023-01-10 RX ORDER — ASCORBIC ACID 500 MG
1000 TABLET ORAL DAILY
Status: DISCONTINUED | OUTPATIENT
Start: 2023-01-10 | End: 2023-01-11 | Stop reason: HOSPADM

## 2023-01-10 RX ORDER — VITAMIN B COMPLEX
2000 TABLET ORAL DAILY
Status: DISCONTINUED | OUTPATIENT
Start: 2023-01-10 | End: 2023-01-11 | Stop reason: HOSPADM

## 2023-01-10 RX ADMIN — SODIUM CHLORIDE, PRESERVATIVE FREE 10 ML: 5 INJECTION INTRAVENOUS at 20:07

## 2023-01-10 RX ADMIN — HEPARIN SODIUM 5000 UNITS: 5000 INJECTION INTRAVENOUS; SUBCUTANEOUS at 06:15

## 2023-01-10 RX ADMIN — VALPROIC ACID 250 MG: 250 CAPSULE, LIQUID FILLED ORAL at 08:28

## 2023-01-10 RX ADMIN — HEPARIN SODIUM 5000 UNITS: 5000 INJECTION INTRAVENOUS; SUBCUTANEOUS at 20:04

## 2023-01-10 RX ADMIN — VALPROIC ACID 500 MG: 250 CAPSULE, LIQUID FILLED ORAL at 20:05

## 2023-01-10 RX ADMIN — AMLODIPINE BESYLATE 2.5 MG: 5 TABLET ORAL at 08:28

## 2023-01-10 RX ADMIN — CEFTRIAXONE 1000 MG: 1 INJECTION, POWDER, FOR SOLUTION INTRAMUSCULAR; INTRAVENOUS at 16:09

## 2023-01-10 RX ADMIN — OXYCODONE HYDROCHLORIDE AND ACETAMINOPHEN 1000 MG: 500 TABLET ORAL at 13:52

## 2023-01-10 RX ADMIN — IMIPRAMINE HYDROCHLORIDE 50 MG: 25 TABLET ORAL at 20:05

## 2023-01-10 RX ADMIN — HEPARIN SODIUM 5000 UNITS: 5000 INJECTION INTRAVENOUS; SUBCUTANEOUS at 13:52

## 2023-01-10 RX ADMIN — SODIUM CHLORIDE, PRESERVATIVE FREE 10 ML: 5 INJECTION INTRAVENOUS at 08:29

## 2023-01-10 RX ADMIN — VITAMIN D, TAB 1000IU (100/BT) 2000 UNITS: 25 TAB at 13:53

## 2023-01-10 RX ADMIN — ASPIRIN 81 MG: 81 TABLET, COATED ORAL at 08:28

## 2023-01-10 NOTE — PROGRESS NOTES
Physical Therapy    Physical Therapy Initial Evaluation/Plan of Care    Room #:  5024/4454-44  Patient Name: Rachael Anaya  YOB: 1940  MRN: [id number]    Date of Service: 1/10/2023     Tentative placement recommendation: Home with Home Health Physical Therapy  Equipment recommendation: None      Evaluating Physical Therapist: Elise Segura PT  #[id number]      Specific Provider Orders/Date/Referring Provider :  01/09/23 1600    PT eval and treat  Start:  01/09/23 1600,   End:  01/09/23 1600,   ONE TIME,   Standing Count:  1 Occurrences,   R         Lei Preston DO     Admitting Diagnosis:   Generalized weakness [R53.1]  End stage renal disease on dialysis ([address] [N18.6, Z99.2]  Acute kidney injury superimposed on chronic kidney disease ([address] [N17.9, N18.9]    Admitted with    weakness and deconditioning; covid +  Surgery: none  Visit Diagnoses         Codes    Generalized weakness    -  Primary R53.1    End stage renal disease on dialysis ([address]     N18.6, Z99.2            Patient Active Problem List   Diagnosis    Hyperparathyroidism due to renal insufficiency ([address]    Gout    Bipolar disorder ([address]    Anemia    Coronary atherosclerosis of native coronary artery    S/P AVR    PAF (paroxysmal atrial fibrillation) ([facility])    S/P Maze operation for atrial fibrillation    ESRD (end stage renal disease) on dialysis ([facility])    Hyperkalemia, diminished renal excretion    Chest pain    Hyperlipidemia LDL goal <100    Essential hypertension    Syncope and collapse    Facial laceration    Aortic valve disease    Pure hypercholesterolemia    Stage 5 chronic kidney disease on chronic dialysis ([address]    ESRD on hemodialysis ([address]    Acute kidney injury superimposed on chronic kidney disease ([address]    ESRD (end stage renal disease) ([address]        ASSESSMENT of Current Deficits Patient exhibits decreased strength, balance, and endurance impairing functional mobility, transfers, gait , gait distance, and tolerance to activity are barriers to d/c and require skilled intervention to address concerns listed above to increase safety and independence at discharge. Decreased strength, balance and endurance  increases patient's risk for fall. Patient is mildly unsteady with gait during session with no loss of balance , dizziness, or shortness of breath   and po2 > 92%      PHYSICAL THERAPY  PLAN OF CARE       Physical therapy plan of care is established based on physician order,  patient diagnosis and clinical assessment    Current Treatment Recommendations:    -Standing Balance: Perform strengthening exercises in standing to promote motor control with or without upper extremity support   -Gait: Gait training and Standing activities to improve: base of support, weight shift, weight bearing    -Endurance: Utilize Supervised activities to increase level of endurance to allow for safe functional mobility including transfers and gait   -Stairs: Stair training with instruction on proper technique and hand placement on rail    PT long term treatment goals are located in below grid    Patient and or family understand(s) diagnosis, prognosis, and plan of care. Frequency of treatments: Patient will be seen  daily. Prior Level of Function: Patient ambulated independently, with cane    Rehab Potential: good   for baseline    Past medical history:   Past Medical History:   Diagnosis Date    Anemia     Aortic stenosis, severe 6/25/14    Arthritis     Bipolar disorder ([address]     Psychosis, anxiety, depression.     CAD (coronary artery disease)     Carotid bruit 10/11/2012    Chronic kidney disease     CRF (chronic renal failure) 4/10/2012    Depression     Gout     Hemodialysis patient ([address]     kiki alanis sat    Hyperlipidemia     Hypertension     Left displaced femoral neck fracture ([address] 10/13/2014    Murmur, heart     Psychosis ([address]     Secondary hyperparathyroidism ([address]     Shingles      Past Surgical History: Procedure Laterality Date    AORTIC VALVE REPLACEMENT  8/5/14    Dr Katy Gutierres  4-30-08    Rev AVF wtih vein patch    COLONOSCOPY  3/15    DIAGNOSTIC CARDIAC CATH LAB PROCEDURE  12/21/2010    No significant epicardial CAD. Systemic HTN. Concentric left ventricular hypertrophy with hyperdynamic left ventricular systolic function. Elevated left ventricular end diastolic pressure. Trace aortic regurgitation. DIALYSIS FISTULA CREATION  1-8-08    L AVF Sofy Galloway)    FRACTURE SURGERY  9/8/14    left hip hemiarthroplasty    HYSTERECTOMY (CERVIX STATUS UNKNOWN)      Partial (S/P 3 vaginal deliveries and 1 miscarriage). TRANSTHORACIC ECHOCARDIOGRAM  6/19/2012    Normal LV size, wall thickness, wall motion and systolic function with stage 1 left ventricular diastolic dysfunction, normal right ventricular size and function, moderate aortic valve sclerosis with mild to moderate aortic stenosis, trace aortic regurgitation and there is aortic root sclerosis/calcification. UPPER GASTROINTESTINAL ENDOSCOPY  3/15       SUBJECTIVE:    Precautions:  Up with assistance and incentive spirometer, falls and alarm ,      Social history: Patient lives alone in a ranch home  with 4 steps, bilateral rails  to enter       Equipment owned: None,       [address] [facility]   How much difficulty turning over in bed?: None  How much difficulty sitting down on / standing up from a chair with arms?: A Little  How much difficulty moving from lying on back to sitting on side of bed?: None  How much help from another person moving to and from a bed to a chair?: A Little  How much help from another person needed to walk in hospital room?: A Little  How much help from another person for climbing 3-5 steps with a railing?: A Little  AM-[facility] Inpatient Mobility Raw Score : 20  AM-PAC Inpatient T-Scale Score : 47.67  Mobility Inpatient CMS 0-100% Score: 35.83  Mobility Inpatient CMS G-Code Modifier : [address] cleared patient for PT evaluation. The admitting diagnosis and active problem list as listed above have been reviewed prior to the initiation of this evaluation. OBJECTIVE;   Initial Evaluation  Date: 1/10/2023 Treatment Date:     Short Term/ Long Term   Goals   Was pt agreeable to Eval/treatment? Yes  To be met in 3 days   Pain level   0/10        Bed Mobility    Rolling: Independent    Supine to sit:  Independent    Sit to supine: Independent    Scooting: Independent        Transfers Sit to stand: Independent        Ambulation     75 feet using  cane with Minimal assist of 1   for balance, safety, and slow pace impacting balance    100 feet using  cane with Independent    Stair negotiation: ascended and descended   Not assessed       4 steps with bilateral rails independent    ROM Within functional limits    Increase range of motion 10% of affected joints    Strength BUE:   4/5  RLE:  4/5  LLE:  4/5  Increase strength in affected mm groups by 1/3 grade   Balance Sitting EOB:  good    Dynamic Standing:  fair    Sitting EOB:  good    Dynamic Standing: good       Patient is Alert & Oriented x person, place, time, and situation and follows directions    Sensation:  Patient  denies numbness/tingling   Edema:  no   Endurance: good       Vitals: room air   Blood Pressure at rest  Blood Pressure during session    Heart Rate at rest 80 Heart Rate during session  107   SPO2 at rest 95%  SPO2 during session 98%     Patient education  Patient educated on role of Physical Therapy, risks of immobility, safety and plan of care,  importance of mobility while in hospital , and safety      Patient response to education:   Pt verbalized understanding Pt demonstrated skill Pt requires further education in this area   Yes Partial Yes      Treatment:  Patient practiced and was instructed/facilitated in the following treatment: Patient   Sat edge of bed 5 minutes with Independent to increase dynamic sitting balance and activity tolerance. seated and standing challenges      Therapeutic Exercises:  ankle pumps  x  10 reps. At end of session, patient in chair with  call light and phone within reach,  all lines and tubes intact, nursing notified. Patient would benefit from continued skilled Physical Therapy to improve functional independence and quality of life. Patient's/ family goals   home    Time in  348  Time out  408    Total Treatment Time  0 minutes    Evaluation time includes thorough review of current medical information, gathering information on past medical history/social history and prior level of function, completion of standardized testing/informal observation of tasks, assessment of data, and development of Plan of care and goals.      CPT codes:  Low Complexity PT evaluation (46688)  No treatment billed    Kenji Arriola, PT

## 2023-01-10 NOTE — CARE COORDINATION
Noted Patient is now Covid Positive, ACP was completed but patient states she is tired, isn't sure of all the measures she would want, wants to think about it. She confirms she still plans to return home on DC with [facility].

## 2023-01-10 NOTE — ACP (ADVANCE CARE PLANNING)
Advance Care Planning     Advance Care Planning Activator (Inpatient)  Conversation Note      Date of ACP Conversation: 1/10/2023     Conversation Conducted with: Patient with Decision Making Capacity    ACP Activator: RICH Grace, [address]:     Current Designated Health Care Decision Maker:     Primary Decision Maker: Alexander Domingo [facility] - [phone]    Care Preferences    Ventilation: \"If you were in your present state of health and suddenly became very ill and were unable to breathe on your own, what would your preference be about the use of a ventilator (breathing machine) if it were available to you? \"      Would the patient desire the use of ventilator (breathing machine)?: yes    \"If your health worsens and it becomes clear that your chance of recovery is unlikely, what would your preference be about the use of a ventilator (breathing machine) if it were available to you? \"     Would the patient desire the use of ventilator (breathing machine)?: No      Resuscitation  \"CPR works best to restart the heart when there is a sudden event, like a heart attack, in someone who is otherwise healthy. Unfortunately, CPR does not typically restart the heart for people who have serious health conditions or who are very sick. \"    \"In the event your heart stopped as a result of an underlying serious health condition, would you want attempts to be made to restart your heart (answer \"yes\" for attempt to resuscitate) or would you prefer a natural death (answer \"no\" for do not attempt to resuscitate)? \"  Patient states she is not sure what she would want.       [] Yes   [x] No   Educated Patient / Rae[facility] [place] regarding differences between Advance Directives and portable DNR orders.     Length of ACP Conversation in minutes:      Conversation Outcomes:  [x] ACP discussion completed  [] Existing advance directive reviewed with patient; no changes to patient's previously recorded wishes  [] New Advance Directive completed  [] Portable Do Not Rescitate prepared for Provider review and signature  [] POLST/POST/MOLST/MOST prepared for Provider review and signature      Follow-up plan:    [] Schedule follow-up conversation to continue planning  [x] Referred individual to Provider for additional questions/concerns   [] Advised patient/agent/surrogate to review completed ACP document and update if needed with changes in condition, patient preferences or care setting    [] This note routed to one or more involved healthcare providers

## 2023-01-10 NOTE — CONSULTS
Consult Note  NEPHROLOGY    Reason for Consult: Maintenance of dialysis needs    Requesting Physician:  Monica Powers DO    Chief Complaint: Admitted with generalized weakness    History Obtained From:  patient, electronic medical record    History of Present Ilness: This is a [de-identified] y.o.  female with a H/O ESRD who normally dialyzes at [facility] per a MWF schedule who now presents to ED with generalized weakness and fatigue. Vitals upon arrival included blood pressure 165/56, temperature 97.6, pulse 74, respirations 18 and 100% O2 saturation. Pertinent labs included WBC 11.8, hemoglobin 10.7, hematocrit 34.1 and platelet count 643. Initial BMP showed sodium 139, potassium 4.7, chloride 98, CO2 22, BUN 81 and creatinine 8.3. Patient was subsequently admitted with generalized weakness and ESRD. Currently upon exam pt is in no acute distress. She is laying comfortably in bed. She currently denies any nausea or vomiting. No diarrhea. She denies any specific urinary symptoms,  She highlights the fact that after returning from dialysis this past Friday she became profoundly weak to the point that she could not get back to bed. Past Medical History:        Diagnosis Date    Anemia     Aortic stenosis, severe 6/25/14    Arthritis     Bipolar disorder ([address]     Psychosis, anxiety, depression.     CAD (coronary artery disease)     Carotid bruit 10/11/2012    Chronic kidney disease     CRF (chronic renal failure) 4/10/2012    Depression     Gout     Hemodialysis patient ([address]     kiki alanis sat    Hyperlipidemia     Hypertension     Left displaced femoral neck fracture ([address] 10/13/2014    Murmur, heart     Psychosis ([address]     Secondary hyperparathyroidism ([address]     Shingles        Past Surgical History:        Procedure Laterality Date    AORTIC VALVE REPLACEMENT  8/5/14    Dr Diane Akbar  4-30-08    Rev AVF wtih vein patch    COLONOSCOPY  3/15    DIAGNOSTIC CARDIAC CATH LAB PROCEDURE  12/21/2010    No significant epicardial CAD. Systemic HTN. Concentric left ventricular hypertrophy with hyperdynamic left ventricular systolic function. Elevated left ventricular end diastolic pressure. Trace aortic regurgitation. DIALYSIS FISTULA CREATION  1-8-08    L AVF Hernán Gallardot)    FRACTURE SURGERY  9/8/14    left hip hemiarthroplasty    HYSTERECTOMY (CERVIX STATUS UNKNOWN)      Partial (S/P 3 vaginal deliveries and 1 miscarriage). TRANSTHORACIC ECHOCARDIOGRAM  6/19/2012    Normal LV size, wall thickness, wall motion and systolic function with stage 1 left ventricular diastolic dysfunction, normal right ventricular size and function, moderate aortic valve sclerosis with mild to moderate aortic stenosis, trace aortic regurgitation and there is aortic root sclerosis/calcification.       UPPER GASTROINTESTINAL ENDOSCOPY  3/15       Current Medications:    Current Facility-Administered Medications: amLODIPine (NORVASC) tablet 2.5 mg, 2.5 mg, Oral, Daily  aspirin EC tablet 81 mg, 81 mg, Oral, Daily  zolpidem (AMBIEN) tablet 5 mg, 5 mg, Oral, Nightly PRN  imipramine (TOFRANIL) tablet 50 mg, 50 mg, Oral, Nightly  valproic acid (DEPAKENE) capsule 500 mg, 500 mg, Oral, Nightly  valproic acid (DEPAKENE) capsule 250 mg, 250 mg, Oral, QAM  cefTRIAXone (ROCEPHIN) 1,000 mg in sterile water 10 mL IV syringe, 1,000 mg, IntraVENous, Q24H  sodium chloride flush 0.9 % injection 5-40 mL, 5-40 mL, IntraVENous, 2 times per day  sodium chloride flush 0.9 % injection 5-40 mL, 5-40 mL, IntraVENous, PRN  0.9 % sodium chloride infusion, , IntraVENous, PRN  heparin (porcine) injection 5,000 Units, 5,000 Units, SubCUTAneous, 3 times per day  ondansetron (ZOFRAN-ODT) disintegrating tablet 4 mg, 4 mg, Oral, Q8H PRN **OR** ondansetron (ZOFRAN) injection 4 mg, 4 mg, IntraVENous, Q6H PRN  polyethylene glycol (GLYCOLAX) packet 17 g, 17 g, Oral, Daily PRN  acetaminophen (TYLENOL) tablet 650 mg, 650 mg, Oral, Q6H PRN **OR** acetaminophen (TYLENOL) suppository 650 mg, 650 mg, Rectal, Q6H PRN    Allergies: Other    Social History:       reports that she has never smoked. She has never used smokeless tobacco. She reports that she does not currently use alcohol. She reports that she does not use drugs. Family History:     History reviewed. No pertinent family history. Review of Systems:       Pertinent positives stated above in HPI. All other systems were reviewed and were negative. Physical exam:   Constitutional:  VITALS:  BP (!) 140/53   Pulse 84   Temp 99 °F (37.2 °C)   Resp 18   Ht 5' 3\" (1.6 m)   Wt 119 lb (54 kg)   SpO2 95%   BMI 21.08 kg/m²   CURRENT TEMPERATURE:  Temp: 99 °F (37.2 °C)  CURRENT RESPIRATORY RATE:  Resp: 18  CURRENT PULSE:  Heart Rate: 84  CURRENT BLOOD PRESSURE:  BP: (!) 140/53  24HR BLOOD PRESSURE RANGE:  Systolic (48JBP), BLM:097 , Min:123 , KVH:259   ; Diastolic (98ZPK), DJK:34, Min:53, Max:89    24HR INTAKE/OUTPUT:    Intake/Output Summary (Last 24 hours) at 1/10/2023 1035  Last data filed at 1/10/2023 6038  Gross per 24 hour   Intake 420 ml   Output 1100 ml   Net -680 ml     Gen: alert, awake, nad  Skin: no rash, turgor wnl  Heent:  eomi, mmm  Neck: no bruits or jvd noted  Cardiovascular:  S1, S2 without m/r/g  Respiratory: Diminished but clear  Abdomen:  +bs, soft, nt, nd  Ext: No edema.   Left AVF with good thrill and bruit  Psychiatric: mood and affect appropriate  Musculoskeletal:  Rom, muscular strength intact    DATA:      CBC:   Lab Results   Component Value Date/Time    WBC 12.1 01/10/2023 03:50 AM    RBC 3.24 01/10/2023 03:50 AM    HGB 11.0 01/10/2023 03:50 AM    HCT 32.9 01/10/2023 03:50 AM    .5 01/10/2023 03:50 AM    MCH 34.0 01/10/2023 03:50 AM    MCHC 33.4 01/10/2023 03:50 AM    RDW 15.3 01/10/2023 03:50 AM     01/10/2023 03:50 AM    MPV 11.0 01/10/2023 03:50 AM     BMP:    Lab Results   Component Value Date/Time     01/10/2023 03:50 AM K 3.7 01/10/2023 03:50 AM    K 5.2 03/14/2022 12:31 PM    CL 94 01/10/2023 03:50 AM    CO2 27 01/10/2023 03:50 AM    BUN 28 01/10/2023 03:50 AM    LABALBU 3.5 01/10/2023 03:50 AM    LABALBU 4.0 02/09/2011 01:55 PM    CREATININE 4.2 01/10/2023 03:50 AM    CALCIUM 8.9 01/10/2023 03:50 AM    GFRAA 12 08/28/2022 07:52 AM    LABGLOM 10 01/10/2023 03:50 AM    GLUCOSE 79 01/10/2023 03:50 AM    GLUCOSE 81 01/17/2012 10:55 AM       RAD:  XR CHEST PORTABLE    Result Date: 1/9/2023  EXAMINATION: ONE XRAY VIEW OF THE CHEST 1/9/2023 1:18 pm COMPARISON: March 14, 2022 HISTORY: ORDERING SYSTEM PROVIDED HISTORY: SOB TECHNOLOGIST PROVIDED HISTORY: Reason for exam:->SOB FINDINGS: Median sternotomy wires are present. The heart is normal in size. No airspace opacity or pleural effusion. No pneumothorax. No evidence of pneumonia or pleural effusion. Assessment/Plan      1. ESRD: on HD via LUE AVF at [address] [facility] MWF: Tolerated dialysis well the evening of admission  - we will follow on HD per chronic orders     2. HTN w/ ESRD: Current BP stable just above goal of < 130/80 - will follow on home medications     3. Generalized weakness:  Further follow-up per the primary team     4. Anemia of CKD- Hgb 11.0 - EDEN managed in the outpatient setting - holding EDEN as hemoglobin currently >10.0     5. Sec HPTH:  Current phosphorus level 4.8 which is within goal range - Ca stable - holding on any binders at this time        Thank you Dr. Jerri Camp for allowing us to participate in care of CYRUS Morocho CNP  1/10/2023  10:35 AM     Patient seen and examined. No family member is present at the bedside. Chart reviewed. I had a face to face encounter with the patient. Agree with exam.    Agree with  formulation, assessment and plan as outlined above and directed by me. Addition and corrections were done as deemed appropriate.    My exam and plan include:     Continue current treatment as outlined above. Patient overall feeling better. We will follow for dialytic support her Nexium on dialysis treatment tomorrow keep her back on a Monday, Wednesday, Friday schedule.                Elena Roca MD  Nephrology        Electronically signed by Elena Roca MD on 1/10/2023 at 2:29 PM

## 2023-01-10 NOTE — FLOWSHEET NOTE
01/09/23 1919   Vital Signs   BP (!) 190/65   Temp 98.2 °F (36.8 °C)   Heart Rate 74   Resp 16   Weight 119 lb 7.8 oz (54.2 kg)   Weight Method Bed scale   Percent Weight Change -0.43   Pain Assessment   Pain Assessment None - Denies Pain   Post-Hemodialysis Assessment   Post-Treatment Procedures Blood returned; Access bleeding time < 10 minutes   Dialyzer Clearance Lightly streaked   Duration of Treatment (minutes) 180 minutes   Hemodialysis Intake (ml) 300 ml   Hemodialysis Output (ml) 1100 ml   NET Removed (ml) 800   Bilateral Breath Sounds Clear;Diminished   Edema Generalized; None

## 2023-01-10 NOTE — CARE COORDINATION
SW spoke with patient she is alert and oriented. She states she lives home alone in a 1 story home with a basement but she \"isn't supposed to go to the basement\". She states she normally ambulates with a cane. She has Visiting physicians at home and her pharmacy is [facility] in [place]. She goes to Dialysis at [facility] in [facility] on MWF at [facility]. She states she has a history of [facility] for rehab and [facility]. Therapy evals are pending, we talked about transition of care, she states her grandchildren are against her going to rehab and they state they will stay with her as well as her neighbor if needed at [place]. She is agreeable to [facility] again and would like [facility] again if able. Call placed to Heide Bruner at [facility] [phone], she requested SW fax referral, faxed face sheet and H and P to [phone] as that is all there is for now. Heide Bruner will review and let SW know if able to accept. Will likely need therapy evals faxed to them when available. Addendum:  SW received message to call grand daughter, Javi Ponce, call placed to her [phone]. She was just wanting an update. Update provided, she agrees with plan for home at [place] with [facility]. She states family checks on patient frequently and is involved in her care. She states if patient is discharged tomorrow her sonErin Courts [phone] will be available to transport her home tomorrow. SW also received message from [facility] they have accepted for PT and OT only. They will need a [facility] order for PT and OT only and discharge instructions faxed to them on [facility], [phone].

## 2023-01-10 NOTE — H&P
Department of Internal Medicine  History and Physical    Admitting Physician: Dr. Bakari Wright  Consultants: Dr. Rita Leroy:  Failure to thrive    HISTORY OF PRESENT ILLNESS:    Cody Burrows is an 79-year-old female who presented to [address] [place] [address] emergency department with profound weakness and deconditioning. She is on dialysis in the setting of end-stage renal disease and last underwent dialysis on Friday. She states that upon returning home, she had become profoundly weak and deconditioned to the point where she was unable to get out of bed to perform activities of daily living. Work-up in the emergency department was relatively benign but she was admitted to the hospital as she required hemodialysis. She received hemodialysis last evening and is feeling better today. She requests to be discharged multiple times but we have encouraged her to maintain hospitalization for increased work with the therapy teams and serologic work-up. We also discussed the need to rule out an infectious process. She voiced understanding and agreement. She describes significant anxiety. PAST MEDICAL Hx:  Past Medical History:   Diagnosis Date    Anemia     Aortic stenosis, severe 6/25/14    Arthritis     Bipolar disorder (HCC)     Psychosis, anxiety, depression.     CAD (coronary artery disease)     Carotid bruit 10/11/2012    Chronic kidney disease     CRF (chronic renal failure) 4/10/2012    Depression     Gout     Hemodialysis patient ([address]     kiki alanis sat    Hyperlipidemia     Hypertension     Left displaced femoral neck fracture ([address] 10/13/2014    Murmur, heart     Psychosis ([address]     Secondary hyperparathyroidism ([address]     Shingles        PAST SURGICAL Hx:   Past Surgical History:   Procedure Laterality Date    AORTIC VALVE REPLACEMENT  8/5/14    Dr Cheryle Coulter  4-30-08    Rev AVF wtih vein patch    COLONOSCOPY  3/15    DIAGNOSTIC CARDIAC CATH LAB PROCEDURE  12/21/2010    No significant epicardial CAD. Systemic HTN. Concentric left ventricular hypertrophy with hyperdynamic left ventricular systolic function. Elevated left ventricular end diastolic pressure. Trace aortic regurgitation. DIALYSIS FISTULA CREATION  1-8-08    L AVF Karle Goodell)    FRACTURE SURGERY  9/8/14    left hip hemiarthroplasty    HYSTERECTOMY (CERVIX STATUS UNKNOWN)      Partial (S/P 3 vaginal deliveries and 1 miscarriage). TRANSTHORACIC ECHOCARDIOGRAM  6/19/2012    Normal LV size, wall thickness, wall motion and systolic function with stage 1 left ventricular diastolic dysfunction, normal right ventricular size and function, moderate aortic valve sclerosis with mild to moderate aortic stenosis, trace aortic regurgitation and there is aortic root sclerosis/calcification. UPPER GASTROINTESTINAL ENDOSCOPY  3/15       FAMILY Hx:  History reviewed. No pertinent family history. HOME MEDICATIONS:  Prior to Admission medications    Medication Sig Start Date End Date Taking? Authorizing Provider   Apoaequorin (PREVAGEN) 10 MG CAPS Take 1 capsule by mouth daily   Yes Historical Provider, MD   AMLODIPINE BESYLATE PO Take 2.5 mg by mouth daily    Historical Provider, MD   doxycycline hyclate (VIBRAMYCIN) 100 MG capsule Take 100 mg by mouth 2 times daily    Historical Provider, MD   Multiple Vitamins-Minerals (PRESERVISION AREDS 2 PO) Take by mouth    Historical Provider, MD   Multiple Vitamins-Minerals (MULTI COMPLETE PO) Take by mouth    Historical Provider, MD   valproic acid (DEPAKENE) 250 MG capsule Take 500 mg by mouth nightly    Historical Provider, MD   nitroGLYCERIN (NITROSTAT) 0.4 MG SL tablet Place 0.4 mg under the tongue every 5 minutes as needed for Chest pain up to max of 3 total doses. If no relief after 1 dose, call 911. Historical Provider, MD   eszopiclone (LUNESTA) 2 MG TABS Take 2 mg by mouth nightly as needed.      Historical Provider, MD   aspirin 81 MG tablet Take 81 mg by mouth daily Historical Provider, MD   imipramine (TOFRANIL) 50 MG tablet Take 50 mg by mouth nightly. Historical Provider, MD   valproic acid (DEPAKENE) 250 MG capsule Take 250 mg by mouth every morning     Historical Provider, MD       ALLERGIES:  Other    SOCIAL Hx:  Social History     Socioeconomic History    Marital status:      Spouse name: Not on file    Number of children: Not on file    Years of education: Not on file    Highest education level: Not on file   Occupational History    Not on file   Tobacco Use    Smoking status: Never    Smokeless tobacco: Never   Vaping Use    Vaping Use: Never used   Substance and Sexual Activity    Alcohol use: Not Currently    Drug use: Never    Sexual activity: Not Currently     Partners: Male   Other Topics Concern    Not on file   Social History Narrative    Denies caffeine. Social Determinants of Health     Financial Resource Strain: Not on file   Food Insecurity: Not on file   Transportation Needs: Not on file   Physical Activity: Not on file   Stress: Not on file   Social Connections: Not on file   Intimate Partner Violence: Not on file   Housing Stability: Not on file       ROS:  General:   Admits to debilitating malaise and fatigue. Psychological:   Admits to significant and chronic anxiety. ENT:    Denies epistaxis, headaches, vertigo or visual changes    Cardiovascular:   Denies any chest pain, irregular heartbeats, or palpitations. No paroxysmal nocturnal dyspnea. Respiratory:   Denies shortness of breath, coughing, sputum production, hemoptysis, or wheezing. No orthopnea. Gastrointestinal:   Admits to decreased appetite and therefore decreased oral intake. Denies overt abdominal pain. Genito-Urinary:    Denies any urgency, frequency, hematuria. Voids very little in the setting of hemodialysis.     Musculoskeletal:   Denies joint pain, joint stiffness, joint swelling or muscle pain    Neurology:    Denies any headache or focal neurological deficits. No weakness or paresthesia. Derm:    Denies any rashes, ulcers, or excoriations. Denies bruising. Extremities:   Denies any lower extremity swelling or edema. PHYSICAL EXAM:  VITALS:  Vitals:    01/10/23 0509   BP: (!) 140/53   Pulse: 84   Resp: 18   Temp: 99 °F (37.2 °C)   SpO2: 96%         CONSTITUTIONAL:    Awake, alert, cooperative, no apparent distress, and appears stated age    EYES:    PERRL, EOMI, sclera clear, conjunctiva normal    ENT:    Normocephalic, atraumatic, sinuses nontender on palpation. External ears without lesions. Oral pharynx with moist mucus membranes. Tonsils without erythema or exudates. NECK:    Supple, symmetrical, trachea midline, no adenopathy, thyroid symmetric, not enlarged and no tenderness, skin normal, no bruits, no JVD    HEMATOLOGIC/LYMPHATICS:    No cervical lymphadenopathy and no supraclavicular lymphadenopathy    LUNGS:    Symmetric. No increased work of breathing, good air exchange, clear to auscultation bilaterally, no wheezes, rhonchi, or rales,     CARDIOVASCULAR:    Normal apical impulse, regular rate and rhythm, normal S1 and S2, no S3 or S4, and systolic murmur    ABDOMEN:    No scars, normal bowel sounds, soft, non-distended, non-tender, no masses palpated, no hepatosplenomegaly, no rebound or guarding elicited on palpation     MUSCULOSKELETAL:    There is no redness, warmth, or swelling of the joints. Full range of motion noted. Motor strength is 5 out of 5 all extremities bilaterally. Tone is normal.    NEUROLOGIC:    Awake, alert, oriented to name, place and time. Cranial nerves II-XII are grossly intact. Motor is 5 out of 5 bilaterally. SKIN:    No bruising or bleeding. No redness, warmth, or swelling    EXTREMITIES:    Peripheral pulses present. No edema, cyanosis, or swelling. OSTEOPATHIC:    Examined in seated and supine positions. Normal thoracic kyphosis and lumbar lordosis.   No acute somatic dysfunction. LABORATORY DATA:  CBC with Differential:    Lab Results   Component Value Date/Time    WBC 12.1 01/10/2023 03:50 AM    RBC 3.24 01/10/2023 03:50 AM    HGB 11.0 01/10/2023 03:50 AM    HCT 32.9 01/10/2023 03:50 AM     01/10/2023 03:50 AM    .5 01/10/2023 03:50 AM    MCH 34.0 01/10/2023 03:50 AM    MCHC 33.4 01/10/2023 03:50 AM    RDW 15.3 01/10/2023 03:50 AM    SEGSPCT 57 07/07/2013 01:20 PM    LYMPHOPCT 10.4 01/10/2023 03:50 AM    MONOPCT 11.2 01/10/2023 03:50 AM    BASOPCT 0.2 01/10/2023 03:50 AM    MONOSABS 1.36 01/10/2023 03:50 AM    LYMPHSABS 1.26 01/10/2023 03:50 AM    EOSABS 0.03 01/10/2023 03:50 AM    BASOSABS 0.03 01/10/2023 03:50 AM     BMP:    Lab Results   Component Value Date/Time     01/10/2023 03:50 AM    K 3.7 01/10/2023 03:50 AM    K 5.2 03/14/2022 12:31 PM    CL 94 01/10/2023 03:50 AM    CO2 27 01/10/2023 03:50 AM    BUN 28 01/10/2023 03:50 AM    LABALBU 3.5 01/10/2023 03:50 AM    LABALBU 4.0 02/09/2011 01:55 PM    CREATININE 4.2 01/10/2023 03:50 AM    CALCIUM 8.9 01/10/2023 03:50 AM    GFRAA 12 08/28/2022 07:52 AM    LABGLOM 10 01/10/2023 03:50 AM    GLUCOSE 79 01/10/2023 03:50 AM    GLUCOSE 81 01/17/2012 10:55 AM       ASSESSMENT:  Acute on end-stage renal disease on dialysis  Probable viral infection  Chronic irritable bowel syndrome exacerbated by underlying anxiety disorder   History of aortic valve replacement with bioprosthetic valve  Chronic, compensated stage II diastolic congestive heart failure  Mild to moderate mitral regurgitation  Mild to moderate tricuspid regurgitation  Hyperlipidemia  Essential hypertension  Bipolar disorder  Depression    PLAN:  Bianka tolerated dialysis overnight. She is feeling better today but remains profoundly weak and deconditioned. Her symptomatology suggest a viral infection and formal respiratory film array panel will be obtained. She will work with the therapy teams today. Chronic comorbidities will be implemented. She is extremely anxious for discharge home and we discussed the possibility of discharge tomorrow.     Keyla Mark DO, D.O., [facility]  6:41 AM  1/10/2023    Electronically signed by Keyla Mark DO on 1/10/23 at 6:41 AM EST

## 2023-01-11 VITALS
WEIGHT: 123.24 LBS | SYSTOLIC BLOOD PRESSURE: 129 MMHG | HEART RATE: 69 BPM | TEMPERATURE: 97.8 F | RESPIRATION RATE: 18 BRPM | OXYGEN SATURATION: 97 % | BODY MASS INDEX: 21.84 KG/M2 | DIASTOLIC BLOOD PRESSURE: 59 MMHG | HEIGHT: 63 IN

## 2023-01-11 LAB
ALBUMIN SERPL-MCNC: 3.5 G/DL (ref 3.5–5.2)
ALP BLD-CCNC: 84 U/L (ref 35–104)
ALT SERPL-CCNC: <5 U/L (ref 0–32)
ANION GAP SERPL CALCULATED.3IONS-SCNC: 12 MMOL/L (ref 7–16)
AST SERPL-CCNC: 9 U/L (ref 0–31)
BASOPHILS ABSOLUTE: 0.02 E9/L (ref 0–0.2)
BASOPHILS RELATIVE PERCENT: 0.4 % (ref 0–2)
BILIRUB SERPL-MCNC: 0.2 MG/DL (ref 0–1.2)
BILIRUBIN DIRECT: <0.2 MG/DL (ref 0–0.3)
BILIRUBIN, INDIRECT: ABNORMAL MG/DL (ref 0–1)
BUN BLDV-MCNC: 33 MG/DL (ref 6–23)
CALCIUM SERPL-MCNC: 8.5 MG/DL (ref 8.6–10.2)
CHLORIDE BLD-SCNC: 95 MMOL/L (ref 98–107)
CO2: 27 MMOL/L (ref 22–29)
CREAT SERPL-MCNC: 4.5 MG/DL (ref 0.5–1)
EOSINOPHILS ABSOLUTE: 0.02 E9/L (ref 0.05–0.5)
EOSINOPHILS RELATIVE PERCENT: 0.4 % (ref 0–6)
GFR SERPL CREATININE-BSD FRML MDRD: 9 ML/MIN/1.73
GLUCOSE BLD-MCNC: 137 MG/DL (ref 74–99)
HCT VFR BLD CALC: 29.4 % (ref 34–48)
HEMOGLOBIN: 10 G/DL (ref 11.5–15.5)
IMMATURE GRANULOCYTES #: 0.05 E9/L
IMMATURE GRANULOCYTES %: 1 % (ref 0–5)
LYMPHOCYTES ABSOLUTE: 0.96 E9/L (ref 1.5–4)
LYMPHOCYTES RELATIVE PERCENT: 18.7 % (ref 20–42)
MAGNESIUM: 2.1 MG/DL (ref 1.6–2.6)
MCH RBC QN AUTO: 34 PG (ref 26–35)
MCHC RBC AUTO-ENTMCNC: 34 % (ref 32–34.5)
MCV RBC AUTO: 100 FL (ref 80–99.9)
MONOCYTES ABSOLUTE: 0.33 E9/L (ref 0.1–0.95)
MONOCYTES RELATIVE PERCENT: 6.4 % (ref 2–12)
NEUTROPHILS ABSOLUTE: 3.76 E9/L (ref 1.8–7.3)
NEUTROPHILS RELATIVE PERCENT: 73.1 % (ref 43–80)
PDW BLD-RTO: 15 FL (ref 11.5–15)
PHOSPHORUS: 3.6 MG/DL (ref 2.5–4.5)
PLATELET # BLD: 155 E9/L (ref 130–450)
PMV BLD AUTO: 10.9 FL (ref 7–12)
POTASSIUM SERPL-SCNC: 3.7 MMOL/L (ref 3.5–5)
RBC # BLD: 2.94 E12/L (ref 3.5–5.5)
SODIUM BLD-SCNC: 134 MMOL/L (ref 132–146)
TOTAL PROTEIN: 5.9 G/DL (ref 6.4–8.3)
WBC # BLD: 5.1 E9/L (ref 4.5–11.5)

## 2023-01-11 PROCEDURE — 6370000000 HC RX 637 (ALT 250 FOR IP): Performed by: INTERNAL MEDICINE

## 2023-01-11 PROCEDURE — 84100 ASSAY OF PHOSPHORUS: CPT

## 2023-01-11 PROCEDURE — 90935 HEMODIALYSIS ONE EVALUATION: CPT

## 2023-01-11 PROCEDURE — 85025 COMPLETE CBC W/AUTO DIFF WBC: CPT

## 2023-01-11 PROCEDURE — 97165 OT EVAL LOW COMPLEX 30 MIN: CPT

## 2023-01-11 PROCEDURE — 97116 GAIT TRAINING THERAPY: CPT

## 2023-01-11 PROCEDURE — 6360000002 HC RX W HCPCS: Performed by: INTERNAL MEDICINE

## 2023-01-11 PROCEDURE — 83735 ASSAY OF MAGNESIUM: CPT

## 2023-01-11 PROCEDURE — 80076 HEPATIC FUNCTION PANEL: CPT

## 2023-01-11 PROCEDURE — 80048 BASIC METABOLIC PNL TOTAL CA: CPT

## 2023-01-11 PROCEDURE — 36415 COLL VENOUS BLD VENIPUNCTURE: CPT

## 2023-01-11 RX ORDER — DOXYCYCLINE HYCLATE 100 MG
100 TABLET ORAL 2 TIMES DAILY
Qty: 10 TABLET | Refills: 0 | Status: SHIPPED | OUTPATIENT
Start: 2023-01-11 | End: 2023-01-16

## 2023-01-11 RX ORDER — CHOLECALCIFEROL (VITAMIN D3) 50 MCG
2000 TABLET ORAL DAILY
Qty: 30 TABLET | Refills: 0 | Status: SHIPPED | OUTPATIENT
Start: 2023-01-11 | End: 2023-02-10

## 2023-01-11 RX ADMIN — OXYCODONE HYDROCHLORIDE AND ACETAMINOPHEN 1000 MG: 500 TABLET ORAL at 13:18

## 2023-01-11 RX ADMIN — AMLODIPINE BESYLATE 2.5 MG: 5 TABLET ORAL at 13:18

## 2023-01-11 RX ADMIN — ASPIRIN 81 MG: 81 TABLET, COATED ORAL at 13:18

## 2023-01-11 RX ADMIN — HEPARIN SODIUM 5000 UNITS: 5000 INJECTION INTRAVENOUS; SUBCUTANEOUS at 04:54

## 2023-01-11 RX ADMIN — VALPROIC ACID 250 MG: 250 CAPSULE, LIQUID FILLED ORAL at 13:19

## 2023-01-11 RX ADMIN — VITAMIN D, TAB 1000IU (100/BT) 2000 UNITS: 25 TAB at 13:19

## 2023-01-11 NOTE — PROGRESS NOTES
[address] [facility]  [address] [place]        Date:2023                                                  Patient Name: Sharlene Mcghee    MRN: [id number]    : 1940    Room: [address] [place] [address]      Evaluating OT: Diann Pollack OTR/L; [id number]     Referring Provider and Specific Provider Orders/Date:      23 1600  OT eval and treat  Start:  23 1600,   End:  23 1600,   ONE TIME,   Standing Count:  1 Occurrences,   R         Sen Slider, DO      Placement Recommendation: HHOT       Diagnosis:   1. Generalized weakness    2. End stage renal disease on dialysis [facility])         Surgery: none      Pertinent Medical History:       Past Medical History:   Diagnosis Date    Anemia     Aortic stenosis, severe 14    Arthritis     Bipolar disorder ([address]     Psychosis, anxiety, depression. CAD (coronary artery disease)     Carotid bruit 10/11/2012    Chronic kidney disease     CRF (chronic renal failure) 4/10/2012    Depression     Gout     Hemodialysis patient ([address]     kiki alanis sat    Hyperlipidemia     Hypertension     Left displaced femoral neck fracture ([address] 10/13/2014    Murmur, heart     Psychosis ([address]     Secondary hyperparathyroidism ([address]     Shingles          Past Surgical History:   Procedure Laterality Date    AORTIC VALVE REPLACEMENT  14    Dr Dominick Salomon  08    Rev AVF wtih vein patch    COLONOSCOPY  3/15    DIAGNOSTIC CARDIAC CATH LAB PROCEDURE  2010    No significant epicardial CAD. Systemic HTN. Concentric left ventricular hypertrophy with hyperdynamic left ventricular systolic function. Elevated left ventricular end diastolic pressure. Trace aortic regurgitation.       DIALYSIS FISTULA CREATION  08    L AVF Antonia Jones)    FRACTURE SURGERY  14    left hip hemiarthroplasty    HYSTERECTOMY (CERVIX STATUS UNKNOWN)      Partial (S/P 3 vaginal deliveries and 1 miscarriage). TRANSTHORACIC ECHOCARDIOGRAM  6/19/2012    Normal LV size, wall thickness, wall motion and systolic function with stage 1 left ventricular diastolic dysfunction, normal right ventricular size and function, moderate aortic valve sclerosis with mild to moderate aortic stenosis, trace aortic regurgitation and there is aortic root sclerosis/calcification.       UPPER GASTROINTESTINAL ENDOSCOPY  3/15      Precautions:  Fall Risk, Droplet Plus/COVID, dialysis      Assessment of current deficits:     [x] Functional mobility  [x]ADLs  [x] Strength               []Cognition    [x] Functional transfers   [x] IADLs         [] Safety Awareness   [x]Endurance    [] Fine Coordination              [x] Balance      [] Vision/perception   []Sensation     []Gross Motor Coordination  [] ROM  [] Delirium                   [] Motor Control     OT PLAN OF CARE   OT POC based on physician orders, patient diagnosis and results of clinical assessment    Frequency/Duration 1-3 days/wk for 2 weeks PRN     Specific OT Treatment Interventions to include:   * Instruction/training on adapted ADL techniques and AE recommendations to increase functional independence within precautions       * Training on energy conservation strategies, correct breathing pattern and techniques to improve independence/tolerance for self-care routine  * Functional transfer/mobility training/DME recommendations for increased independence, safety, and fall prevention  * Patient/Family education to increase follow through with safety techniques and functional independence  * Recommendation of environmental modifications for increased safety with functional transfers/mobility and ADLs  * Therapeutic exercise to improve motor endurance, ROM, and functional strength for ADLs/functional transfers  * Therapeutic activities to facilitate/challenge dynamic balance, stand tolerance for increased safety and independence with ADLs  * Positioning to improve skin integrity, interaction with environment and functional independence    Recommended Adaptive Equipment: wheeled walker     Home Living: alone; single family home, 1 story, 4 steps to enter with B rails, tub shower. Comment: grandson lives in apartment over garage. Equipment owned: none     Prior Level of Function: Independent with ADLs , Independent with IADLs; ambulated with no device, pt states she has chronic pain in her hip from a previous hip sx 8 years ago. Pain Level: bilateral hip pain, pt did not rate level of pain, walk provide for ambulation and hip pain improved; Nursing notified. Cognition: A&O: 4/4; Follows 2 step directions   Memory: good    Sequencing: good    Problem solving: good    Judgement/safety: good     [facility]   AM-PAC Daily Activity Inpatient   How much help for putting on and taking off regular lower body clothing?: A Little  How much help for Bathing?: A Little  How much help for Toileting?: A Little  How much help for putting on and taking off regular upper body clothing?: A Little  How much help for taking care of personal grooming?: A Little  How much help for eating meals?: None  AM-[facility] Inpatient Daily Activity Raw Score: 19  AM-PAC Inpatient ADL T-Scale Score : 40.22  ADL Inpatient CMS 0-100% Score: 42.8  ADL Inpatient CMS G-Code Modifier : CK     Functional Assessment:    Initial Eval Status  Date: 11/11/23 Treatment Status  Date: STGs = LTGs  Time frame: 10-14 days   Feeding Independent   Independent    Grooming Supervision   Independent    UB Dressing Supervision   Independent    LB Dressing Supervision to doff and don slippers at EOB. Supervision to don knee high socks while seated EOB. Independent    Bathing Supervision  Independent    Toileting Supervision   Independent    Bed Mobility  Supine to sit: N/T as pt was seated EOB   Sit to supine: N/T as pt was returned to EOB. Supine to sit:  Independent   Sit to supine: Independent Functional Transfers Supervision from EOB  Supervision for transfer to and from low commode with minimal verbal cues to use grab bar for safe commode transfer. Transfer training with verbal cues for hand placement throughout session to improve safety. Independent    Functional Mobility Minimal Assist with hand held assist to improve balance to and from bathroom due to bilateral hip pain, verbal cues for sequence and safety. PT to trial wheeled walker. Modified Bennett    Balance Sitting:     Static: good     Dynamic: good   Standing: fair with hand held assist     Activity Tolerance fair   good    Visual/  Perceptual Glasses: yes                 Hand Dominance: right      AROM (PROM) Strength Additional Info:    RUE  WFL 4/5 good  and wfl FMC/dexterity noted during ADL tasks     LUE WFL 4/5 good  and wfl FMC/dexterity noted during ADL tasks       Hearing: WFL   Sensation:  No c/o numbness or tingling  Tone: WFL   Edema: no    Comments: Upon arrival the patient was seated EOB. At end of session, patient was returned to EOB and left in care of PT to trial wheeled walker. Overall patient demonstrated decreased independence and safety during completion of ADL/functional transfer/mobility tasks. Pt would benefit from continued skilled OT to increase safety and independence with completion of ADL/IADL tasks for functional independence and quality of life. Treatment: OT treatment provided this date includes:   Instruction/training on safety and adapted techniques for completion of ADLs   Instruction/training on safe functional mobility/transfer techniques   Instruction/training on energy conservation/work simplification for completion of ADLs   Instruction/training on proper positioning/alignment to prevent contractures     Rehab Potential: Good for established goals.       Patient / Family Goal: return home       Patient and/or family were instructed on functional diagnosis, prognosis/goals and OT plan of care. Demonstrated good understanding. Eval Complexity: Low    Time In: 8:20am  Time Out: 8:40am    Total Treatment Time: 0      Min Units   OT Eval Low 97165  X  1    OT Eval Medium 32762      OT Eval High 94686      OT Re-Eval [id number]            ADL/Self Care 76521     Therapeutic Activities 40906       Therapeutic Ex 02562       Orthotic Management 29301       Manual 21898     Neuro Re-Ed 99099       Non-Billable Time        Evaluation Time additionally includes thorough review of current medical information, gathering information on past medical history/social history and prior level of function, interpretation of standardized testing/informal observation of tasks, assessment of data and development of plan of care and goals.         Evaluating OT: Pete Wilson OTR/L; [id number]

## 2023-01-11 NOTE — PROGRESS NOTES
Physical Therapy    Physical Therapy Treatment Note/Plan of Care    Room #:  3419/0006-85  Patient Name: Chelsea Anaya  YOB: 1940  MRN: [id number]    Date of Service: 1/11/2023     Tentative placement recommendation: Home with Home Health Physical Therapy  Equipment recommendation: Tete Cevallos      Evaluating Physical Therapist: Janice Patel  #[id number]      Specific Provider Orders/Date/Referring Provider :  01/09/23 1600    PT eval and treat  Start:  01/09/23 1600,   End:  01/09/23 1600,   ONE TIME,   Standing Count:  1 Occurrences,   R         Rena Guillory DO     Admitting Diagnosis:   Generalized weakness [R53.1]  End stage renal disease on dialysis ([facility] [N18.6, Z99.2]  Acute kidney injury superimposed on chronic kidney disease ([address] [N17.9, N18.9]    Admitted with    weakness and deconditioning; covid +  Surgery: none  Visit Diagnoses         Codes    Generalized weakness    -  Primary R53.1    End stage renal disease on dialysis ([address]     N18.6, Z99.2            Patient Active Problem List   Diagnosis    Hyperparathyroidism due to renal insufficiency ([address]    Gout    Bipolar disorder ([address]    Anemia    Coronary atherosclerosis of native coronary artery    S/P AVR    PAF (paroxysmal atrial fibrillation) ([facility])    S/P Maze operation for atrial fibrillation    ESRD (end stage renal disease) on dialysis ([facility])    Hyperkalemia, diminished renal excretion    Chest pain    Hyperlipidemia LDL goal <100    Essential hypertension    Syncope and collapse    Facial laceration    Aortic valve disease    Pure hypercholesterolemia    Stage 5 chronic kidney disease on chronic dialysis ([address]    ESRD on hemodialysis ([address]    Acute kidney injury superimposed on chronic kidney disease ([address]    ESRD (end stage renal disease) ([address]        ASSESSMENT of Current Deficits Patient exhibits decreased strength, balance, and endurance impairing functional mobility, transfers, gait , gait distance, and tolerance to activity are barriers to d/c and require skilled intervention to address concerns listed above to increase safety and independence at discharge. Patient with improved endurance and steadiness with wheeled walker compared to ambulation with no assistive device. Supervision for all functional mobility with wheeled walker. PHYSICAL THERAPY  PLAN OF CARE       Physical therapy plan of care is established based on physician order,  patient diagnosis and clinical assessment    Current Treatment Recommendations:    -Standing Balance: Perform strengthening exercises in standing to promote motor control with or without upper extremity support   -Gait: Gait training and Standing activities to improve: base of support, weight shift, weight bearing    -Endurance: Utilize Supervised activities to increase level of endurance to allow for safe functional mobility including transfers and gait   -Stairs: Stair training with instruction on proper technique and hand placement on rail    PT long term treatment goals are located in below grid    Patient and or family understand(s) diagnosis, prognosis, and plan of care. Frequency of treatments: Patient will be seen  daily. Prior Level of Function: Patient ambulated independently, with cane    Rehab Potential: good   for baseline    Past medical history:   Past Medical History:   Diagnosis Date    Anemia     Aortic stenosis, severe 6/25/14    Arthritis     Bipolar disorder ([address]     Psychosis, anxiety, depression.     CAD (coronary artery disease)     Carotid bruit 10/11/2012    Chronic kidney disease     CRF (chronic renal failure) 4/10/2012    Depression     Gout     Hemodialysis patient ([address]     kiki alanis sat    Hyperlipidemia     Hypertension     Left displaced femoral neck fracture ([address] 10/13/2014    Murmur, heart     Psychosis ([address]     Secondary hyperparathyroidism ([address]     Shingles      Past Surgical History:   Procedure Laterality Date    AORTIC VALVE REPLACEMENT  8/5/14    Dr Kaylyn Ray  4-30-08    Rev AVF wtih vein patch    COLONOSCOPY  3/15    DIAGNOSTIC CARDIAC CATH LAB PROCEDURE  12/21/2010    No significant epicardial CAD. Systemic HTN. Concentric left ventricular hypertrophy with hyperdynamic left ventricular systolic function. Elevated left ventricular end diastolic pressure. Trace aortic regurgitation. DIALYSIS FISTULA CREATION  1-8-08    L AVF Ray Cook)    FRACTURE SURGERY  9/8/14    left hip hemiarthroplasty    HYSTERECTOMY (CERVIX STATUS UNKNOWN)      Partial (S/P 3 vaginal deliveries and 1 miscarriage). TRANSTHORACIC ECHOCARDIOGRAM  6/19/2012    Normal LV size, wall thickness, wall motion and systolic function with stage 1 left ventricular diastolic dysfunction, normal right ventricular size and function, moderate aortic valve sclerosis with mild to moderate aortic stenosis, trace aortic regurgitation and there is aortic root sclerosis/calcification. UPPER GASTROINTESTINAL ENDOSCOPY  3/15       SUBJECTIVE:    Precautions:  Up with assistance and incentive spirometer, falls and alarm ,      Social history: Patient lives alone in a ranch home  with 4 steps, bilateral rails  to enter       Equipment owned: None,       [address] [facility]   How much difficulty turning over in bed?: None  How much difficulty sitting down on / standing up from a chair with arms?: A Little  How much difficulty moving from lying on back to sitting on side of bed?: None  How much help from another person moving to and from a bed to a chair?: A Little  How much help from another person needed to walk in hospital room?: A Little  How much help from another person for climbing 3-5 steps with a railing?: A Little  AM-PAC Inpatient Mobility Raw Score : 20  AM-PAC Inpatient T-Scale Score : 47.67  Mobility Inpatient CMS 0-100% Score: 35.83  Mobility Inpatient CMS G-Code Modifier : [email or web address] cleared patient for PT treatment. .    OBJECTIVE;   Initial Evaluation  Date: 1/10/2023 Treatment Date:    1/11/2023   Short Term/ Long Term   Goals   Was pt agreeable to Eval/treatment? Yes Yes To be met in 3 days   Pain level   0/10    Pain in el hips, no number given     Bed Mobility    Rolling: Independent    Supine to sit: Independent    Sit to supine: Independent    Scooting: Independent    Rolling: Independent   Supine to sit:  Independent   Sit to supine: Independent   Scooting: Independent       Transfers Sit to stand: Independent   Sit to stand: Supervision         Ambulation     75 feet using  cane with Minimal assist of 1   for balance, safety, and slow pace impacting balance 30 ft with HHA and min A and 50  feet using  wheeled walker with Supervision    for safety     100 feet using  cane with Independent    Stair negotiation: ascended and descended   Not assessed     Not assessed due to transport present to take patient to dialysis        4 steps with bilateral rails independent    ROM Within functional limits    Increase range of motion 10% of affected joints    Strength BUE:   4/5  RLE:  4/5  LLE:  4/5  Increase strength in affected mm groups by 1/3 grade   Balance Sitting EOB:  good    Dynamic Standing:  fair   Sitting EOB: good   Dynamic Standing: good - wheeled walker     Sitting EOB:  good    Dynamic Standing: good       Patient is Alert & Oriented x person, place, time, and situation and follows directions    Sensation:  Patient  denies numbness/tingling   Edema:  no   Endurance: good       Vitals: room air   Blood Pressure at rest  Blood Pressure during session    Heart Rate at rest 80 Heart Rate during session  107   SPO2 at rest 95%  SPO2 during session 98%     Patient education  Patient educated on role of Physical Therapy, risks of immobility, safety and plan of care,  importance of mobility while in hospital , and safety      Patient response to education:   Pt verbalized understanding Pt demonstrated skill Pt requires further education in this area   Yes Partial Yes      Treatment:  Patient practiced and was instructed/facilitated in the following treatment: Patient   Sat edge of bed 5 minutes with Independent to increase dynamic sitting balance and activity tolerance. Pt stood and ambulated to restroom and back to EOB with OT. Pt stood again with PT and ambulated with HHA and min A due to pain in bilateral hips. Pt stood again after rest break on EOB and ambulated with wheeled walker with supervision. Assisted abck to supine due to transport present for dialysis. Therapeutic Exercises:  not performed        At end of session, patient in bed with  call light and phone within reach,  all lines and tubes intact, nursing notified. Patient would benefit from continued skilled Physical Therapy to improve functional independence and quality of life. Patient's/ family goals   home    Time in  0820  Time out  0840    Total Treatment Time  20 minutes    Evaluation time includes thorough review of current medical information, gathering information on past medical history/social history and prior level of function, completion of standardized testing/informal observation of tasks, assessment of data, and development of Plan of care and goals.      CPT codes:  Therapeutic activities (13254)   5 minutes  0 unit(s)  Gait Training (68640) 10 minutes 1 unit(s)  Non billable time 5 minutes with [address], PT  #[id number]

## 2023-01-11 NOTE — DISCHARGE SUMMARY
Internal Medicine Progress Note     ABDIRIZAK=[facility]     Edson Abbott. Praveen Ryan., GEORGEOClaytonI. Jeremías Hooker D.O., SARITA. Yaneth Noel D.O. Yasmeen Guo, MSN, APRN, NP-C  Ivan Mistry. Clay Montiel, MSN, APRN-CNP       Internal Medicine  Discharge Summary    NAME: Yue Jung  :  1940  MRN:  [id number]  PCP:No primary care provider on file. ADMITTED: 2023      DISCHARGED: 23    ADMITTING PHYSICIAN: Indigo Stokes DO    CONSULTANT(S):   IP CONSULT TO NEPHROLOGY  IP CONSULT TO SOCIAL WORK     ADMITTING DIAGNOSIS:   Generalized weakness [R53.1]  End stage renal disease on dialysis ([facility] [address] [N18.6, Z99.2]  Acute kidney injury superimposed on chronic kidney disease ([address] [N17.9, N18.9]     DISCHARGE DIAGNOSES:   Acute on end-stage renal disease on dialysis  COVID-19 without overt symptomatology aside from failure to thrive   Chronic irritable bowel syndrome exacerbated by underlying anxiety disorder   History of aortic valve replacement with bioprosthetic valve  Chronic, compensated stage II diastolic congestive heart failure  Mild to moderate mitral regurgitation  Mild to moderate tricuspid regurgitation  Hyperlipidemia  Essential hypertension  Bipolar disorder  Depression    BRIEF HISTORY OF PRESENT ILLNESS:   Brent Solis is an [de-identified] female who presented to [address] [place] [address] emergency department with profound weakness and deconditioning. She is on dialysis in the setting of end-stage renal disease and last underwent dialysis on Friday. She states that upon returning home, she had become profoundly weak and deconditioned to the point where she was unable to get out of bed to perform activities of daily living. Work-up in the emergency department was relatively benign but she was admitted to the hospital as she required hemodialysis. She received hemodialysis last evening and is feeling better today.   She requests to be discharged multiple times but we have encouraged her to maintain hospitalization for increased work with the therapy teams and serologic work-up. We also discussed the need to rule out an infectious process. She voiced understanding and agreement. She describes significant anxiety. LABS[de-identified]  Lab Results   Component Value Date    WBC 12.1 (H) 01/10/2023    HGB 11.0 (L) 01/10/2023    HCT 32.9 (L) 01/10/2023     01/10/2023     01/10/2023    K 3.7 01/10/2023    CL 94 (L) 01/10/2023    CREATININE 4.2 (H) 01/10/2023    BUN 28 (H) 01/10/2023    CO2 27 01/10/2023    GLUCOSE 79 01/10/2023    ALT <5 01/10/2023    AST 11 01/10/2023    INR 1.0 07/22/2020     Lab Results   Component Value Date    INR 1.0 07/22/2020    INR 1.1 09/07/2014    INR 1.5 07/30/2014    PROTIME 11.7 07/22/2020    PROTIME 11.9 09/07/2014    PROTIME 16.3 (H) 07/30/2014      Lab Results   Component Value Date    TSH 2.470 01/10/2023     Lab Results   Component Value Date    TRIG 200 (H) 01/10/2023    TRIG 181 (H) 08/07/2019     Lab Results   Component Value Date    HDL 37 01/10/2023    HDL 39 08/07/2019     Lab Results   Component Value Date    LDLCALC 78 01/10/2023    LDLCALC 100 (H) 08/07/2019     Lab Results   Component Value Date    LABA1C 4.5 01/10/2023       IMAGING:  XR CHEST PORTABLE    Result Date: 1/9/2023  EXAMINATION: ONE XRAY VIEW OF THE CHEST 1/9/2023 1:18 pm COMPARISON: March 14, 2022 HISTORY: ORDERING SYSTEM PROVIDED HISTORY: SOB TECHNOLOGIST PROVIDED HISTORY: Reason for exam:->SOB FINDINGS: Median sternotomy wires are present. The heart is normal in size. No airspace opacity or pleural effusion. No pneumothorax. No evidence of pneumonia or pleural effusion. HOSPITAL COURSE:   Johanny Osorio did very well throughout the hospitalization. She presented to the hospital with a central failure to thrive and was unable to undergo outpatient dialysis in the setting.   She underwent regular dialysis here and will undergo dialysis this morning prior to discharge. She was found to be suffering from COVID-pneumonia without overt respiratory or GI symptomatology. Her primary symptom was overall failure to thrive. She worked with the therapy teams and improved. Appropriate vitamin supplementation has been provided. She understands importance of ongoing activity and use of the incentive spirometer. She is acceptable for discharge home following dialysis today. Patient is medically stable and acceptable for discharge today. BRIEF PHYSICAL EXAMINATION AND LABORATORIES ON DAY OF DISCHARGE:  VITALS:  BP (!) 140/80   Pulse 80   Temp 98.4 °F (36.9 °C) (Oral)   Resp 18   Ht 5' 3\" (1.6 m)   Wt 119 lb (54 kg)   SpO2 97%   BMI 21.08 kg/m²     HEENT:  PERRLA. EOMI. Sclera clear. Buccal mucosa moist.    Neck:  Supple. Trachea midline. No thyromegaly. No JVD. No bruits. Heart:  Rhythm regular, rate controlled. Systolic murmur    Lungs:  Symmetrical. Clear to auscultation bilaterally. No wheezes. No rhonchi. No rales. Abdomen: Soft. Non-tender. Non-distended. Bowel sounds positive. No organomegaly or masses. No pain on palpation    Extremities:  Peripheral pulses present. No peripheral edema. No ulcers. Neurologic:  Alert x 3. No focal deficit. Cranial nerves grossly intact. Skin:  No petechia. No hemorrhage. No wounds. DISPOSITION:  The patient's condition is stable. At this time the patient is without objective evidence of an acute process requiring continuing hospitalization or inpatient management. They are stable for discharge with outpatient follow-up. I have spoken with the patient and discussed the results of the current hospitalization, in addition to providing specific details for the plan of care and counseling regarding the diagnosis and prognosis. The plan has been discussed in detail and they are aware of the specific conditions for emergent return, as well as the importance of follow-up.   Their questions are answered at this time and they are agreeable with the plan for discharge to home    DISCHARGE MEDICATIONS:   Current Discharge Medication List             Details   ascorbic acid (VITAMIN C) 1000 MG tablet Take 1 tablet by mouth daily  Qty: 30 tablet, Refills: 0      doxycycline hyclate (VIBRA-TABS) 100 MG tablet Take 1 tablet by mouth 2 times daily for 5 days  Qty: 10 tablet, Refills: 0      Vitamin D (CHOLECALCIFEROL) 50 MCG (2000 UT) TABS tablet Take 1 tablet by mouth daily  Qty: 30 tablet, Refills: 0    Comments: Labeling may look different. 25 mcg=1000 Units. Please double check dosages. Details   AMLODIPINE BESYLATE PO Take 2.5 mg by mouth daily      !! Multiple Vitamins-Minerals (PRESERVISION AREDS 2 PO) Take by mouth      !! Multiple Vitamins-Minerals (MULTI COMPLETE PO) Take by mouth      !! valproic acid (DEPAKENE) 250 MG capsule Take 500 mg by mouth nightly      nitroGLYCERIN (NITROSTAT) 0.4 MG SL tablet Place 0.4 mg under the tongue every 5 minutes as needed for Chest pain up to max of 3 total doses. If no relief after 1 dose, call 911.      eszopiclone (LUNESTA) 2 MG TABS Take 2 mg by mouth nightly as needed. aspirin 81 MG tablet Take 81 mg by mouth daily      imipramine (TOFRANIL) 50 MG tablet Take 50 mg by mouth nightly. !! valproic acid (DEPAKENE) 250 MG capsule Take 250 mg by mouth every morning        !! - Potential duplicate medications found. Please discuss with provider. FOLLOW UP/INSTRUCTIONS:  This patient is instructed to follow-up with her primary care physician. Patient is instructed to follow-up with the consults listed above as directed by them. she is instructed to resume home medications and take new medications as indicated in the list above. If the patient has a recurrence of symptoms, she is instructed to go to the ED.     Preparing for this patient's discharge, including paperwork, orders, instructions, and meeting with patient did require > 40 minutes.     Delfino Munoz, DO   1/11/2023  8:36 AM

## 2023-01-11 NOTE — PROGRESS NOTES
NEPHROLOGY Attending   Progress Note  1/11/2023 10:53 AM  Subjective:   Admit Date: 1/9/2023  PCP: No primary care provider on file. Interval History:     Seen on dialysis - tolerating well - no CP or SOB - stable vitals - anticipating discharge soon       Diet: ADULT DIET; Regular; Low Sodium (2 gm); Low Potassium (Less than 3000 mg/day); Low Phosphorus (Less than 1000 mg); 1500 ml    Data:   Scheduled Meds:   ascorbic acid  1,000 mg Oral Daily    Vitamin D  2,000 Units Oral Daily    amLODIPine  2.5 mg Oral Daily    aspirin  81 mg Oral Daily    imipramine  50 mg Oral Nightly    valproic acid  500 mg Oral Nightly    valproic acid  250 mg Oral QAM    cefTRIAXone (ROCEPHIN) IV  1,000 mg IntraVENous Q24H    sodium chloride flush  5-40 mL IntraVENous 2 times per day    heparin (porcine)  5,000 Units SubCUTAneous 3 times per day     Continuous Infusions:   sodium chloride       PRN Meds:zolpidem, sodium chloride flush, sodium chloride, ondansetron **OR** ondansetron, polyethylene glycol, acetaminophen **OR** acetaminophen    Intake/Output Summary (Last 24 hours) at 1/11/2023 1053  Last data filed at 1/11/2023 0813  Gross per 24 hour   Intake 240 ml   Output --   Net 240 ml     CBC:   Recent Labs     01/09/23  1300 01/10/23  0350 01/11/23  0905   WBC 11.8* 12.1* 5.1   HGB 10.7* 11.0* 10.0*    174 155     BMP:    Recent Labs     01/09/23  1300 01/10/23  0350 01/11/23  0905    136 134   K 4.7 3.7 3.7   CL 98 94* 95*   CO2 22 27 27   BUN 81* 28* 33*   CREATININE 8.3* 4.2* 4.5*   GLUCOSE 78 79 137*     Hepatic:   Recent Labs     01/09/23  1300 01/10/23  0350 01/11/23  0905   AST 11 11 9   ALT <5 <5 <5   BILITOT 0.3 0.4 0.2   ALKPHOS 99 90 84     Troponin: No results for input(s): TROPONINI in the last 72 hours. BNP: No results for input(s): BNP in the last 72 hours.   Lipids:   Recent Labs     01/10/23  0350   CHOL 155   HDL 37     ABGs: No results found for: PHART, PO2ART, XBA0LNO  INR: No results for input(s): INR in the last 72 hours. -----------------------------------------------------------------  RAD: XR CHEST PORTABLE    Result Date: 1/9/2023  EXAMINATION: ONE XRAY VIEW OF THE CHEST 1/9/2023 1:18 pm COMPARISON: March 14, 2022 HISTORY: ORDERING SYSTEM PROVIDED HISTORY: SOB TECHNOLOGIST PROVIDED HISTORY: Reason for exam:->SOB FINDINGS: Median sternotomy wires are present. The heart is normal in size. No airspace opacity or pleural effusion. No pneumothorax. No evidence of pneumonia or pleural effusion. Objective:   Vitals:   Vitals:    01/11/23 0813   BP: (!) 140/80   Pulse: 80   Resp: 18   Temp: 98.4 °F (36.9 °C)   SpO2: 97%     Patient Vitals for the past 24 hrs:   BP Temp Temp src Pulse Resp SpO2   01/11/23 0813 (!) 140/80 98.4 °F (36.9 °C) Oral 80 18 97 %   01/10/23 1955 (!) 140/60 98.4 °F (36.9 °C) -- 80 17 98 %   01/10/23 1611 132/73 98.9 °F (37.2 °C) Oral 86 18 99 %     Gen: alert, awake, nad  Skin: no rash, turgor wnl  Heent:  eomi, mmm  Neck: no bruits or jvd noted  Cardiovascular:  S1, S2 without m/r/g  Respiratory: Diminished but clear  Abdomen:  +bs, soft, nt, nd  Ext: No edema. Left AVF with good thrill and bruit  Psychiatric: mood and affect appropriate  Musculoskeletal:  Rom, muscular strength intact      Assessment/Plan:     1. ESRD: on HD via LUE AVF at [address] MWF: will follow on HD      2. HTN w/ ESRD: Current BP stable just above goal of < 130/80 - will follow on home medications     3. Generalized weakness:  Further follow-up per the primary team     4. Anemia of CKD- Hgb 11.0 - EDEN managed in the outpatient setting - holding EDEN as hemoglobin currently >10.0     5.  Sec HPTH:  Current phosphorus level 4.8 which is within goal range - Ca stable - holding on any binders at this time       Stable for discharge from a renal perspective - we will continue to follow in the outpatient dialysis setting        CYRUS Meléndez - CNP     Patient seen and examined on hemodialysis. Overall feeling better. Plan is for the patient to be discharged later today. .   No family member is present at the bedside. Chart reviewed. I had a face to face encounter with the patient. Agree with exam.    Agree with  formulation, assessment and plan as outlined above and directed by me. Addition and corrections were done as deemed appropriate. My exam and plan include:     Continue current treatment as outlined above. Patient is stable for discharge from a renal standpoint.              Analilia Lyles MD  Nephrology        Electronically signed by Analilia Lyles MD on 1/11/2023 at 7:41 PM

## 2023-01-11 NOTE — PROGRESS NOTES
CLINICAL PHARMACY NOTE: MEDS TO BEDS    Total # of Prescriptions Filled: 3   The following medications were delivered to the patient:  Vitamin c 1000 mg  Doxycycline hyclate 100 mg  Vitamin d3 50 mcg    Additional Documentation:

## 2023-01-11 NOTE — CARE COORDINATION
Noted DC order. Plan remains home at DC, therapy recommending [address], patient states she does not have a preference for DME company. Call placed to DAISYYOSI MURRIETAON at [address] [facility] and requested [address] for DC, order in. Call placed to Dot Taylor at [facility] and notified him of DC, faxed DC summary, therapy evals and HHC orders. He states they will plan to see patient tomorrow for Start of care. Patients son, Vaishnavi Irizarry [phone] will transport home today, he needs called when ready for DC. SW called and spoke with [facility] and notified them of DC today and patient to return at her regular time on Friday. Faxed them a DC summary to [phone].

## 2023-01-11 NOTE — DISCHARGE INSTRUCTIONS
Your information:  Name: Shiv Kirkland  : 1940    Your instructions:    YOU ARE BEING DISCHARGED HOME WITH HOME CARE. PLEASE MAKE AND KEEP YOUR FOLLOW UP APPOINTMENTS. What to do after you leave the hospital:    Recommended diet: regular diet and cardiac diet    Recommended activity: activity as tolerated     IF YOU EXPERIENCE ANY OF THE FOLLOWING SYMPTOMS, CHEST PAIN, SHORTNESS OF BREATH, COUGHING UP BLOOD OR BLOODY SPUTUM, STOMACH PAIN OR CRAMPING, DARK, TARRY STOOLS, LOSS OF APPETITE, GENERAL NOT FEELING WELL, SIGNS AND SYMPTOMS OF INFECTION LIKE FEVER AND OR CHILLS, PLEASE CALL FAMILY DOCTOR OR RETURN TO THE EMERGENCY ROOM. The following personal items were collected during your admission and were returned to you:    Belongings  Dental Appliances: None  Vision - Corrective Lenses: Eyeglasses, At bedside  Hearing Aid: None  Clothing: Bathrobe, Pants, Shirt, Slippers, Socks, Undergarments  Jewelry: Earrings, Kirstie Goldberg, At bedside  Body Piercings Removed: N/A  Electronic Devices: Cell Phone, Tablet, , At bedside  Weapons (Notify Protective Services/Security): None  Other Valuables: At home  Home Medications: None  Valuables Given To: Patient  Provide Name(s) of Who Valuable(s) Were Given To: self    Information obtained by:  By signing below, I understand that if any problems occur once I leave the hospital I am to contact family doctor or go to emergency room. I understand and acknowledge receipt of the instructions indicated above.

## 2023-01-11 NOTE — FLOWSHEET NOTE
01/11/23 1150   Vital Signs   BP (!) 129/59   Temp 97.8 °F (36.6 °C)   Heart Rate 69   Resp 18   Weight 123 lb 3.8 oz (55.9 kg)   Weight Method Bed scale   Percent Weight Change 0.18   Pain Assessment   Pain Assessment None - Denies Pain   Post-Hemodialysis Assessment   Machine Disinfection Process Exterior Machine Disinfection   Rinseback Volume (ml) 300 ml   Blood Volume Processed (Liters) 55.9 l/min   Dialyzer Clearance Lightly streaked   Duration of Treatment (minutes) 3 minutes   Hemodialysis Intake (ml) 300 ml   Hemodialysis Output (ml) 678 ml   NET Removed (ml) 378   Bilateral Breath Sounds Diminished   Edema None   Time Off 1150   Patient Disposition Return to room

## 2023-01-12 LAB — URINE CULTURE, ROUTINE: NORMAL

## 2023-01-14 NOTE — PROGRESS NOTES
Physician Progress Note      Heraclio Mae  CSN #:                  [id number]  :                       1940  ADMIT DATE:       2023 12:43 PM  [address] DATE:        2023 2:40 PM  RESPONDING  PROVIDER #:        Merlinda Gust Black DO          QUERY TEXT:    Pt admitted with FTT. Noted per dc summary note  \"She was found to be   suffering from COVID-pneumonia without overt respiratory or GI symptomatology. \" Please document in progress notes and discharge summary if you are   evaluating or treating any of the following: The medical record reflects the following:  Risk Factors: Advanced Age, ESRD  Clinical Indicators: Per H&P \"Her symptomatology suggest a viral infection,   Probable viral infection\",   Per Discharge summary \"She was found to be   suffering from COVID-pneumonia without overt respiratory or GI   symptomatology\", CXR \"No evidence of pneumonia or pleural effusion\"  Treatment: doxycycline hyclate, Isolation: Droplet Plus, CXR, Labs, vitamin   supplementation  Options provided:  -- Pneumonia due to COVID-19 as evidenced by, Please document evidence.   -- Patient has Covid 19 only Pneumonia ruled out  -- Other - I will add my own diagnosis  -- Disagree - Not applicable / Not valid  -- Disagree - Clinically unable to determine / Unknown  -- Refer to Clinical Documentation Reviewer    PROVIDER RESPONSE TEXT:    This patient has pneumonia due to COVID-19 as evidenced by    Query created by: Grisel Villela on 2023 11:30 PM      Electronically signed by:  Kelsey Fernandes DO 2023 10:39 AM

## 2023-01-15 LAB
BLOOD CULTURE, ROUTINE: NORMAL
CULTURE, BLOOD 2: NORMAL

## 2023-09-26 ENCOUNTER — OFFICE VISIT (OUTPATIENT)
Dept: VASCULAR SURGERY | Age: 83
End: 2023-09-26
Payer: MEDICARE

## 2023-09-26 DIAGNOSIS — Z99.2 ESRD (END STAGE RENAL DISEASE) ON DIALYSIS (HCC): Primary | Chronic | ICD-10-CM

## 2023-09-26 DIAGNOSIS — N18.6 ESRD (END STAGE RENAL DISEASE) ON DIALYSIS (HCC): Primary | Chronic | ICD-10-CM

## 2023-09-26 PROCEDURE — 1090F PRES/ABSN URINE INCON ASSESS: CPT | Performed by: PHYSICIAN ASSISTANT

## 2023-09-26 PROCEDURE — 99203 OFFICE O/P NEW LOW 30 MIN: CPT | Performed by: PHYSICIAN ASSISTANT

## 2023-09-26 PROCEDURE — 1123F ACP DISCUSS/DSCN MKR DOCD: CPT | Performed by: PHYSICIAN ASSISTANT

## 2023-09-26 PROCEDURE — 1036F TOBACCO NON-USER: CPT | Performed by: PHYSICIAN ASSISTANT

## 2023-09-26 PROCEDURE — G8427 DOCREV CUR MEDS BY ELIG CLIN: HCPCS | Performed by: PHYSICIAN ASSISTANT

## 2023-09-26 PROCEDURE — G8420 CALC BMI NORM PARAMETERS: HCPCS | Performed by: PHYSICIAN ASSISTANT

## 2023-09-26 PROCEDURE — G8399 PT W/DXA RESULTS DOCUMENT: HCPCS | Performed by: PHYSICIAN ASSISTANT

## 2023-09-26 NOTE — PROGRESS NOTES
Vascular Surgery Outpatient Consultation      Chief Complaint   Patient presents with    Surgical Consult     Evaluate shiny areas on (L) AVF. HISTORY OF PRESENT ILLNESS:                The patient is a 82 y.o. 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 Dialysis referred her here because she has been having some shiny skin overlaying the aneurysmal portions but patient does not feel the size or the appearance of the fistula has changed in the last 6 months. She denies any problems with the fistula and states that it has been functioning for dialysis. She denies any prolonged bleeding following her dialysis treatments. She denies any ulcerations. She denies any pain or swelling of the arm. The fistula has been functioning since the revision by Dr. Yan Staples in 2008. Past Medical History:        Diagnosis Date    Anemia     Aortic stenosis, severe 6/25/14    Arthritis     Bipolar disorder ([address])     Psychosis, anxiety, depression. CAD (coronary artery disease)     Carotid bruit 10/11/2012    Chronic kidney disease     CRF (chronic renal failure) 4/10/2012    Depression     Gout     Hemodialysis patient ([address])     kiki alanis sat    Hyperlipidemia     Hypertension     Left displaced femoral neck fracture ([address]) 10/13/2014    Murmur, heart     Psychosis ([address])     Secondary hyperparathyroidism ([address])     Shingles      Past Surgical History:        Procedure Laterality Date    AORTIC VALVE REPLACEMENT  8/5/14    Dr Mauricio Omer  4-30-08    Rev AVF wtih vein patch    COLONOSCOPY  3/15    DIAGNOSTIC CARDIAC CATH LAB PROCEDURE  12/21/2010    No significant epicardial CAD. Systemic HTN. Concentric left ventricular hypertrophy with hyperdynamic left ventricular systolic function. Elevated left ventricular end diastolic pressure. Trace aortic regurgitation.       DIALYSIS FISTULA CREATION  1-8-08    L AVF Yan Staples)    FRACTURE SURGERY  9/8/14    left hip

## 2023-10-10 ENCOUNTER — TELEPHONE (OUTPATIENT)
Dept: NON INVASIVE DIAGNOSTICS | Age: 83
End: 2023-10-10

## 2023-10-10 NOTE — TELEPHONE ENCOUNTER
Called patient re:pending echo order. Left message and phone number to call if patient wishes to proceed with testing.

## 2023-10-11 DIAGNOSIS — Z95.2 S/P AVR (AORTIC VALVE REPLACEMENT): ICD-10-CM

## 2023-10-11 DIAGNOSIS — I38 VHD (VALVULAR HEART DISEASE): Primary | ICD-10-CM

## 2023-10-11 DIAGNOSIS — I34.0 MITRAL VALVE INSUFFICIENCY, UNSPECIFIED ETIOLOGY: ICD-10-CM

## 2024-01-05 ENCOUNTER — OFFICE VISIT (OUTPATIENT)
Dept: CARDIOLOGY CLINIC | Age: 84
End: 2024-01-05

## 2024-01-05 VITALS
HEIGHT: 63 IN | HEART RATE: 71 BPM | RESPIRATION RATE: 16 BRPM | OXYGEN SATURATION: 95 % | WEIGHT: 117.2 LBS | DIASTOLIC BLOOD PRESSURE: 56 MMHG | SYSTOLIC BLOOD PRESSURE: 138 MMHG | BODY MASS INDEX: 20.77 KG/M2

## 2024-01-05 DIAGNOSIS — Z87.39 H/O: GOUT: ICD-10-CM

## 2024-01-05 DIAGNOSIS — D63.1 ANEMIA DUE TO CHRONIC KIDNEY DISEASE, ON CHRONIC DIALYSIS (HCC): ICD-10-CM

## 2024-01-05 DIAGNOSIS — R79.89 ELEVATED TROPONIN: ICD-10-CM

## 2024-01-05 DIAGNOSIS — E78.5 DYSLIPIDEMIA: ICD-10-CM

## 2024-01-05 DIAGNOSIS — F31.9 BIPOLAR AFFECTIVE DISORDER, REMISSION STATUS UNSPECIFIED (HCC): ICD-10-CM

## 2024-01-05 DIAGNOSIS — Q24.5 ANOMALOUS RIGHT CORONARY ARTERY: ICD-10-CM

## 2024-01-05 DIAGNOSIS — N18.6 ANEMIA DUE TO CHRONIC KIDNEY DISEASE, ON CHRONIC DIALYSIS (HCC): ICD-10-CM

## 2024-01-05 DIAGNOSIS — N18.6 ESRD (END STAGE RENAL DISEASE) ON DIALYSIS (HCC): ICD-10-CM

## 2024-01-05 DIAGNOSIS — Z99.2 ANEMIA DUE TO CHRONIC KIDNEY DISEASE, ON CHRONIC DIALYSIS (HCC): ICD-10-CM

## 2024-01-05 DIAGNOSIS — N25.81 HYPERPARATHYROIDISM DUE TO RENAL INSUFFICIENCY (HCC): ICD-10-CM

## 2024-01-05 DIAGNOSIS — Z87.898 H/O SYNCOPE: ICD-10-CM

## 2024-01-05 DIAGNOSIS — I48.0 PAF (PAROXYSMAL ATRIAL FIBRILLATION) (HCC): ICD-10-CM

## 2024-01-05 DIAGNOSIS — I10 ESSENTIAL HYPERTENSION: ICD-10-CM

## 2024-01-05 DIAGNOSIS — I34.0 MITRAL VALVE INSUFFICIENCY, UNSPECIFIED ETIOLOGY: ICD-10-CM

## 2024-01-05 DIAGNOSIS — Z86.79 S/P MAZE OPERATION FOR ATRIAL FIBRILLATION: ICD-10-CM

## 2024-01-05 DIAGNOSIS — Z98.890 S/P MAZE OPERATION FOR ATRIAL FIBRILLATION: ICD-10-CM

## 2024-01-05 DIAGNOSIS — Z99.2 ESRD (END STAGE RENAL DISEASE) ON DIALYSIS (HCC): ICD-10-CM

## 2024-01-05 DIAGNOSIS — I38 VHD (VALVULAR HEART DISEASE): Primary | ICD-10-CM

## 2024-01-05 DIAGNOSIS — Z95.2 S/P AVR: ICD-10-CM

## 2024-01-05 DIAGNOSIS — Z86.73 OLD CEREBELLAR INFARCT WITHOUT LATE EFFECT: ICD-10-CM

## 2024-01-05 RX ORDER — SEVELAMER CARBONATE 800 MG/1
1 TABLET, FILM COATED ORAL
COMMUNITY

## 2024-01-05 RX ORDER — DOXYCYCLINE HYCLATE 100 MG/1
100 CAPSULE ORAL DAILY
COMMUNITY

## 2024-01-05 NOTE — PROGRESS NOTES
hyperparathyroidism.    6.   History of anemia of chronic disease.   7.  Hypertension: Adequately controlled.    8.  History of hyperlipidemia.    9.  Bipolar disorder.   10.  History of gout.    11.  Mild to moderate mitral regurgitation on echocardiogram in 3/2020.    12.  History of syncopal episodes:  Multi-factorial.  No recurrence since 7/2020.   13.  Small 4-mm chronic lacunar infarction in right cerebellar hemisphere noted on CT scan of brain in 03/2022.  14.  Covid 19 infection in 1/2023.         TREATMENT PLAN:   Reassure.   Continue current cardiac medications.    Echocardiogram for follow up valvular heart disease.     Follow up with Cardiology in 1 year or on a prn basis pending the results of the echocardiogram.     35 minutes of time was spent on today's visit, reviewing her medical records, performing a history and physical, making recommendations and in preparing today's document.                [facility]  [address] [place] [address] [id number] [facility] [address] [phone]

## 2024-01-16 ENCOUNTER — TELEPHONE (OUTPATIENT)
Dept: CARDIOLOGY | Age: 84
End: 2024-01-16

## 2024-02-29 ENCOUNTER — TELEPHONE (OUTPATIENT)
Dept: CARDIOLOGY | Age: 84
End: 2024-02-29

## 2024-02-29 NOTE — TELEPHONE ENCOUNTER
Patient returned call regarding echo.  She had it done at her home by [facility] and is documented in the chart.    Electronically signed by Lesli Cook on 2/29/2024 at 2:13 PM

## 2024-02-29 NOTE — TELEPHONE ENCOUNTER
Left 3 messages to schedule echo.    Patient either no-showed or canceled echo multiple times.    Electronically signed by Lesli Cook on 2/29/2024 at 1:35 PM

## 2024-08-13 ENCOUNTER — APPOINTMENT (OUTPATIENT)
Dept: CT IMAGING | Age: 84
End: 2024-08-13
Payer: MEDICARE

## 2024-08-13 ENCOUNTER — APPOINTMENT (OUTPATIENT)
Dept: GENERAL RADIOLOGY | Age: 84
End: 2024-08-13
Payer: MEDICARE

## 2024-08-13 ENCOUNTER — HOSPITAL ENCOUNTER (EMERGENCY)
Age: 84
Discharge: HOME | End: 2024-08-13
Attending: EMERGENCY MEDICINE
Payer: MEDICARE

## 2024-08-13 VITALS
DIASTOLIC BLOOD PRESSURE: 70 MMHG | HEART RATE: 63 BPM | WEIGHT: 117.2 LBS | TEMPERATURE: 98.2 F | RESPIRATION RATE: 20 BRPM | OXYGEN SATURATION: 98 % | BODY MASS INDEX: 20.76 KG/M2 | SYSTOLIC BLOOD PRESSURE: 168 MMHG

## 2024-08-13 DIAGNOSIS — M25.40: ICD-10-CM
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
## 2024-08-13 PROCEDURE — 6370000000 HC RX 637 (ALT 250 FOR IP)

## 2024-08-13 PROCEDURE — 71045 X-RAY EXAM CHEST 1 VIEW: CPT

## 2024-08-13 PROCEDURE — 99284 EMERGENCY DEPT VISIT MOD MDM: CPT

## 2024-08-13 PROCEDURE — 70450 CT HEAD/BRAIN W/O DYE: CPT

## 2024-08-13 PROCEDURE — 72125 CT NECK SPINE W/O DYE: CPT

## 2024-08-13 PROCEDURE — 73562 X-RAY EXAM OF KNEE 3: CPT

## 2024-08-13 RX ORDER — ACETAMINOPHEN 325 MG/1
650 TABLET ORAL ONCE
Status: COMPLETED | OUTPATIENT
Start: 2024-08-13 | End: 2024-08-13

## 2024-08-13 RX ADMIN — ACETAMINOPHEN 650 MG: 325 TABLET ORAL at 11:48

## 2024-08-13 ASSESSMENT — PAIN SCALES - GENERAL: PAINLEVEL_OUTOF10: 5

## 2024-08-13 NOTE — ED PROVIDER NOTES
[facility] EMERGENCY DEPARTMENT  EMERGENCY DEPARTMENT ENCOUNTER        Pt Name: Bianka Corral  MRN: [id number]  Birthdate 1940  Date of evaluation: 8/13/2024  Provider: Taqueria Calle DO  PCP: No primary care provider on file.  Note Started: 11:02 AM EDT 8/13/24    CHIEF COMPLAINT       Chief Complaint   Patient presents with    Fall     Tripped outside injuring right knee-pain free without weight bearing.       HISTORY OF PRESENT ILLNESS: 1 or more Elements   History received from: Patient    Bianka Corral is a 84 y.o. female who presents to the emergency department with chief complaint of fall.  Patient states this happened when she was at home.  Patient states that she was watering her garden and tripped on the sidewalk and fell and landed on her right knee and her buttocks.  She states that she is only having pain in the right knee but only when she is bearing weight.  She states that otherwise she has no other significant symptoms and she did not hit her head or lose consciousness.  Patient states that  she did not hit her head or lose consciousness.  Patient states that she is not sure if she takes anticoagulation or not.  Otherwise denies any other symptoms and states that she is feeling well.  She states she is not having any fever, chills, nausea, vomiting, chest pain, shortness of breath, abdominal pain, hematuria or dysuria, constipation or diarrhea.    Nursing Notes were all reviewed and agreed with or any disagreements were addressed in the HPI.    REVIEW OF SYSTEMS :    Positives and Pertinent negatives as per HPI.    PAST MEDICAL HISTORY/Chronic Conditions Affecting Care    has a past medical history of Anemia, Aortic stenosis, severe (6/25/14), Arthritis, Bipolar disorder ([facility]), CAD (coronary artery disease), Carotid bruit (10/11/2012), Chronic kidney disease, CRF (chronic renal failure) (4/10/2012), Depression, Gout, Hemodialysis patient ([facility]), Hyperlipidemia,

## 2024-08-13 NOTE — DISCHARGE INSTRUCTIONS
Please call and follow-up with your family physician as soon as possible.  Return to emergency department if symptoms persist or worsen.

## 2024-12-17 ENCOUNTER — TELEPHONE (OUTPATIENT)
Dept: ADMINISTRATIVE | Age: 84
End: 2024-12-17

## 2024-12-19 NOTE — TELEPHONE ENCOUNTER
Called PT to schedule (message said call could not be completed please try call again later) unable to leave message

## 2025-08-08 ENCOUNTER — HOSPITAL ENCOUNTER (EMERGENCY)
Age: 85
Discharge: HOME | End: 2025-08-08
Payer: MEDICARE

## 2025-08-08 ENCOUNTER — APPOINTMENT (OUTPATIENT)
Dept: CT IMAGING | Age: 85
End: 2025-08-08
Payer: MEDICARE

## 2025-08-08 VITALS
WEIGHT: 105 LBS | BODY MASS INDEX: 18.6 KG/M2 | HEART RATE: 74 BPM | RESPIRATION RATE: 17 BRPM | TEMPERATURE: 97.3 F | SYSTOLIC BLOOD PRESSURE: 155 MMHG | DIASTOLIC BLOOD PRESSURE: 75 MMHG | OXYGEN SATURATION: 98 %

## 2025-08-08 DIAGNOSIS — R11.2 NAUSEA AND VOMITING, UNSPECIFIED VOMITING TYPE: Primary | ICD-10-CM

## 2025-08-08 LAB
ALBUMIN SERPL-MCNC: 4.3 G/DL (ref 3.5–5.2)
ALP SERPL-CCNC: 113 U/L (ref 35–104)
ALT SERPL-CCNC: 6 U/L (ref 0–35)
ANION GAP SERPL CALCULATED.3IONS-SCNC: 18 MMOL/L (ref 7–16)
AST SERPL-CCNC: 35 U/L (ref 0–35)
BASOPHILS # BLD: 0.03 K/UL (ref 0–0.2)
BASOPHILS NFR BLD: 1 % (ref 0–2)
BILIRUB SERPL-MCNC: 0.3 MG/DL (ref 0–1.2)
BUN SERPL-MCNC: 63 MG/DL (ref 8–23)
CALCIUM SERPL-MCNC: 10 MG/DL (ref 8.8–10.2)
CHLORIDE SERPL-SCNC: 94 MMOL/L (ref 98–107)
CO2 SERPL-SCNC: 23 MMOL/L (ref 22–29)
CREAT SERPL-MCNC: 7.5 MG/DL (ref 0.5–1)
EKG ATRIAL RATE: 61 BPM
EKG P AXIS: 46 DEGREES
EKG P-R INTERVAL: 162 MS
EKG Q-T INTERVAL: 438 MS
EKG QRS DURATION: 86 MS
EKG QTC CALCULATION (BAZETT): 440 MS
EKG R AXIS: 25 DEGREES
EKG T AXIS: 64 DEGREES
EKG VENTRICULAR RATE: 61 BPM
EOSINOPHIL # BLD: 0.05 K/UL (ref 0.05–0.5)
EOSINOPHILS RELATIVE PERCENT: 1 % (ref 0–6)
ERYTHROCYTE [DISTWIDTH] IN BLOOD BY AUTOMATED COUNT: 15.8 % (ref 11.5–15)
GFR, ESTIMATED: 5 ML/MIN/1.73M2
GLUCOSE SERPL-MCNC: 81 MG/DL (ref 74–99)
HCT VFR BLD AUTO: 36.2 % (ref 34–48)
HGB BLD-MCNC: 12.1 G/DL (ref 11.5–15.5)
IMM GRANULOCYTES # BLD AUTO: <0.03 K/UL (ref 0–0.58)
IMM GRANULOCYTES NFR BLD: 0 % (ref 0–5)
LIPASE SERPL-CCNC: 55 U/L (ref 13–60)
LYMPHOCYTES NFR BLD: 1.79 K/UL (ref 1.5–4)
LYMPHOCYTES RELATIVE PERCENT: 28 % (ref 20–42)
MCH RBC QN AUTO: 32.4 PG (ref 26–35)
MCHC RBC AUTO-ENTMCNC: 33.4 G/DL (ref 32–34.5)
MCV RBC AUTO: 97.1 FL (ref 80–99.9)
MONOCYTES NFR BLD: 0.67 K/UL (ref 0.1–0.95)
MONOCYTES NFR BLD: 11 % (ref 2–12)
NEUTROPHILS NFR BLD: 60 % (ref 43–80)
NEUTS SEG NFR BLD: 3.8 K/UL (ref 1.8–7.3)
PLATELET # BLD AUTO: 173 K/UL (ref 130–450)
PMV BLD AUTO: 10.8 FL (ref 7–12)
POTASSIUM SERPL-SCNC: 5.9 MMOL/L (ref 3.5–5.1)
PROT SERPL-MCNC: 7.3 G/DL (ref 6.4–8.3)
RBC # BLD AUTO: 3.73 M/UL (ref 3.5–5.5)
SODIUM SERPL-SCNC: 135 MMOL/L (ref 136–145)
WBC OTHER # BLD: 6.4 K/UL (ref 4.5–11.5)

## 2025-08-08 PROCEDURE — 93010 ELECTROCARDIOGRAM REPORT: CPT | Performed by: INTERNAL MEDICINE

## 2025-08-08 PROCEDURE — 74176 CT ABD & PELVIS W/O CONTRAST: CPT

## 2025-08-08 PROCEDURE — 99284 EMERGENCY DEPT VISIT MOD MDM: CPT

## 2025-08-08 PROCEDURE — 6370000000 HC RX 637 (ALT 250 FOR IP): Performed by: PHYSICIAN ASSISTANT

## 2025-08-08 PROCEDURE — 93005 ELECTROCARDIOGRAM TRACING: CPT | Performed by: PHYSICIAN ASSISTANT

## 2025-08-08 PROCEDURE — 83690 ASSAY OF LIPASE: CPT

## 2025-08-08 PROCEDURE — 85025 COMPLETE CBC W/AUTO DIFF WBC: CPT

## 2025-08-08 PROCEDURE — 80053 COMPREHEN METABOLIC PANEL: CPT

## 2025-08-08 RX ORDER — ONDANSETRON 4 MG/1
4 TABLET, ORALLY DISINTEGRATING ORAL ONCE
Status: COMPLETED | OUTPATIENT
Start: 2025-08-08 | End: 2025-08-08

## 2025-08-08 RX ORDER — ONDANSETRON 4 MG/1
4 TABLET, FILM COATED ORAL 3 TIMES DAILY PRN
Qty: 15 TABLET | Refills: 0 | Status: ON HOLD | OUTPATIENT
Start: 2025-08-08

## 2025-08-08 RX ORDER — 0.9 % SODIUM CHLORIDE 0.9 %
500 INTRAVENOUS SOLUTION INTRAVENOUS ONCE
Status: DISCONTINUED | OUTPATIENT
Start: 2025-08-08 | End: 2025-08-08

## 2025-08-08 RX ADMIN — ONDANSETRON 4 MG: 4 TABLET, ORALLY DISINTEGRATING ORAL at 11:56

## 2025-08-08 ASSESSMENT — LIFESTYLE VARIABLES: HOW MANY STANDARD DRINKS CONTAINING ALCOHOL DO YOU HAVE ON A TYPICAL DAY: PATIENT DOES NOT DRINK

## 2025-08-08 ASSESSMENT — PAIN - FUNCTIONAL ASSESSMENT: PAIN_FUNCTIONAL_ASSESSMENT: NONE - DENIES PAIN

## 2025-08-09 ENCOUNTER — HOSPITAL ENCOUNTER (INPATIENT)
Age: 85
LOS: 5 days | Discharge: NURSING, REHAB OR LONG-TERM CARE | DRG: 535 | End: 2025-08-14
Admitting: INTERNAL MEDICINE
Payer: MEDICARE

## 2025-08-09 ENCOUNTER — APPOINTMENT (OUTPATIENT)
Dept: CT IMAGING | Age: 85
DRG: 535 | End: 2025-08-09
Payer: MEDICARE

## 2025-08-09 ENCOUNTER — APPOINTMENT (OUTPATIENT)
Dept: GENERAL RADIOLOGY | Age: 85
DRG: 535 | End: 2025-08-09
Payer: MEDICARE

## 2025-08-09 DIAGNOSIS — E87.5 HYPERKALEMIA: ICD-10-CM

## 2025-08-09 DIAGNOSIS — Z99.2 ESRD ON DIALYSIS (HCC): ICD-10-CM

## 2025-08-09 DIAGNOSIS — N18.6 ESRD ON DIALYSIS (HCC): ICD-10-CM

## 2025-08-09 DIAGNOSIS — M97.02XA PERIPROSTHETIC FRACTURE AROUND INTERNAL PROSTHETIC LEFT HIP JOINT, INITIAL ENCOUNTER (HCC): Primary | ICD-10-CM

## 2025-08-09 DIAGNOSIS — S60.212A CONTUSION OF LEFT WRIST, INITIAL ENCOUNTER: ICD-10-CM

## 2025-08-09 PROBLEM — S72.002A CLOSED LEFT HIP FRACTURE, INITIAL ENCOUNTER (HCC): Status: ACTIVE | Noted: 2025-08-09

## 2025-08-09 LAB
ANION GAP SERPL CALCULATED.3IONS-SCNC: 16 MMOL/L (ref 7–16)
BASOPHILS # BLD: 0.02 K/UL (ref 0–0.2)
BASOPHILS NFR BLD: 0 % (ref 0–2)
BUN SERPL-MCNC: 82 MG/DL (ref 8–23)
CALCIUM SERPL-MCNC: 9.2 MG/DL (ref 8.8–10.2)
CHLORIDE SERPL-SCNC: 95 MMOL/L (ref 98–107)
CO2 SERPL-SCNC: 25 MMOL/L (ref 22–29)
CREAT SERPL-MCNC: 9.1 MG/DL (ref 0.5–1)
EOSINOPHIL # BLD: 0.07 K/UL (ref 0.05–0.5)
EOSINOPHILS RELATIVE PERCENT: 1 % (ref 0–6)
ERYTHROCYTE [DISTWIDTH] IN BLOOD BY AUTOMATED COUNT: 15.9 % (ref 11.5–15)
GFR, ESTIMATED: 4 ML/MIN/1.73M2
GLUCOSE SERPL-MCNC: 139 MG/DL (ref 74–99)
HCT VFR BLD AUTO: 28.3 % (ref 34–48)
HGB BLD-MCNC: 9.4 G/DL (ref 11.5–15.5)
IMM GRANULOCYTES # BLD AUTO: 0.03 K/UL (ref 0–0.58)
IMM GRANULOCYTES NFR BLD: 1 % (ref 0–5)
LYMPHOCYTES NFR BLD: 1.77 K/UL (ref 1.5–4)
LYMPHOCYTES RELATIVE PERCENT: 29 % (ref 20–42)
MCH RBC QN AUTO: 31.2 PG (ref 26–35)
MCHC RBC AUTO-ENTMCNC: 33.2 G/DL (ref 32–34.5)
MCV RBC AUTO: 94 FL (ref 80–99.9)
MONOCYTES NFR BLD: 0.58 K/UL (ref 0.1–0.95)
MONOCYTES NFR BLD: 10 % (ref 2–12)
NEUTROPHILS NFR BLD: 60 % (ref 43–80)
NEUTS SEG NFR BLD: 3.64 K/UL (ref 1.8–7.3)
PLATELET # BLD AUTO: 154 K/UL (ref 130–450)
PMV BLD AUTO: 11.1 FL (ref 7–12)
POTASSIUM SERPL-SCNC: 6 MMOL/L (ref 3.5–5.1)
RBC # BLD AUTO: 3.01 M/UL (ref 3.5–5.5)
SODIUM SERPL-SCNC: 135 MMOL/L (ref 136–145)
WBC OTHER # BLD: 6.1 K/UL (ref 4.5–11.5)

## 2025-08-09 PROCEDURE — 93005 ELECTROCARDIOGRAM TRACING: CPT

## 2025-08-09 PROCEDURE — 73110 X-RAY EXAM OF WRIST: CPT

## 2025-08-09 PROCEDURE — 99285 EMERGENCY DEPT VISIT HI MDM: CPT

## 2025-08-09 PROCEDURE — 73700 CT LOWER EXTREMITY W/O DYE: CPT

## 2025-08-09 PROCEDURE — 2580000003 HC RX 258

## 2025-08-09 PROCEDURE — 99223 1ST HOSP IP/OBS HIGH 75: CPT | Performed by: HOSPITALIST

## 2025-08-09 PROCEDURE — 2060000000 HC ICU INTERMEDIATE R&B

## 2025-08-09 PROCEDURE — 73502 X-RAY EXAM HIP UNI 2-3 VIEWS: CPT

## 2025-08-09 PROCEDURE — 6370000000 HC RX 637 (ALT 250 FOR IP)

## 2025-08-09 PROCEDURE — 96374 THER/PROPH/DIAG INJ IV PUSH: CPT

## 2025-08-09 PROCEDURE — 90935 HEMODIALYSIS ONE EVALUATION: CPT

## 2025-08-09 PROCEDURE — 80048 BASIC METABOLIC PNL TOTAL CA: CPT

## 2025-08-09 PROCEDURE — 85025 COMPLETE CBC W/AUTO DIFF WBC: CPT

## 2025-08-09 RX ORDER — OXYCODONE AND ACETAMINOPHEN 5; 325 MG/1; MG/1
1 TABLET ORAL EVERY 4 HOURS PRN
Status: DISCONTINUED | OUTPATIENT
Start: 2025-08-09 | End: 2025-08-14 | Stop reason: HOSPADM

## 2025-08-09 RX ORDER — VALPROIC ACID 250 MG/1
500 CAPSULE ORAL NIGHTLY
Status: DISCONTINUED | OUTPATIENT
Start: 2025-08-10 | End: 2025-08-14 | Stop reason: HOSPADM

## 2025-08-09 RX ORDER — IMIPRAMINE HYDROCHLORIDE 25 MG/1
50 TABLET, FILM COATED ORAL NIGHTLY
Status: DISCONTINUED | OUTPATIENT
Start: 2025-08-10 | End: 2025-08-14 | Stop reason: HOSPADM

## 2025-08-09 RX ORDER — AMLODIPINE BESYLATE 2.5 MG/1
2.5 TABLET ORAL DAILY
Status: DISCONTINUED | OUTPATIENT
Start: 2025-08-10 | End: 2025-08-14 | Stop reason: HOSPADM

## 2025-08-09 RX ORDER — ONDANSETRON 2 MG/ML
4 INJECTION INTRAMUSCULAR; INTRAVENOUS EVERY 6 HOURS PRN
Status: DISCONTINUED | OUTPATIENT
Start: 2025-08-09 | End: 2025-08-14 | Stop reason: HOSPADM

## 2025-08-09 RX ORDER — GLUCAGON 1 MG/ML
1 KIT INJECTION PRN
Status: DISCONTINUED | OUTPATIENT
Start: 2025-08-09 | End: 2025-08-14 | Stop reason: HOSPADM

## 2025-08-09 RX ORDER — DEXTROSE MONOHYDRATE 100 MG/ML
INJECTION, SOLUTION INTRAVENOUS CONTINUOUS PRN
Status: DISCONTINUED | OUTPATIENT
Start: 2025-08-09 | End: 2025-08-14 | Stop reason: HOSPADM

## 2025-08-09 RX ORDER — SEVELAMER CARBONATE 800 MG/1
1600 TABLET, FILM COATED ORAL
Status: DISCONTINUED | OUTPATIENT
Start: 2025-08-10 | End: 2025-08-14 | Stop reason: HOSPADM

## 2025-08-09 RX ORDER — ASPIRIN 81 MG/1
81 TABLET, CHEWABLE ORAL DAILY
Status: DISCONTINUED | OUTPATIENT
Start: 2025-08-10 | End: 2025-08-14 | Stop reason: HOSPADM

## 2025-08-09 RX ORDER — SODIUM CHLORIDE 9 MG/ML
INJECTION, SOLUTION INTRAVENOUS PRN
Status: DISCONTINUED | OUTPATIENT
Start: 2025-08-09 | End: 2025-08-14 | Stop reason: HOSPADM

## 2025-08-09 RX ORDER — SODIUM CHLORIDE 0.9 % (FLUSH) 0.9 %
5-40 SYRINGE (ML) INJECTION PRN
Status: DISCONTINUED | OUTPATIENT
Start: 2025-08-09 | End: 2025-08-14 | Stop reason: HOSPADM

## 2025-08-09 RX ORDER — MAGNESIUM SULFATE IN WATER 40 MG/ML
2000 INJECTION, SOLUTION INTRAVENOUS PRN
Status: DISCONTINUED | OUTPATIENT
Start: 2025-08-09 | End: 2025-08-10

## 2025-08-09 RX ORDER — 0.9 % SODIUM CHLORIDE 0.9 %
1000 INTRAVENOUS SOLUTION INTRAVENOUS ONCE
Status: COMPLETED | OUTPATIENT
Start: 2025-08-09 | End: 2025-08-09

## 2025-08-09 RX ORDER — ACETAMINOPHEN 650 MG/1
650 SUPPOSITORY RECTAL EVERY 6 HOURS PRN
Status: DISCONTINUED | OUTPATIENT
Start: 2025-08-09 | End: 2025-08-14 | Stop reason: HOSPADM

## 2025-08-09 RX ORDER — SODIUM CHLORIDE 0.9 % (FLUSH) 0.9 %
5-40 SYRINGE (ML) INJECTION EVERY 12 HOURS SCHEDULED
Status: DISCONTINUED | OUTPATIENT
Start: 2025-08-10 | End: 2025-08-14 | Stop reason: HOSPADM

## 2025-08-09 RX ORDER — POLYETHYLENE GLYCOL 3350 17 G/17G
17 POWDER, FOR SOLUTION ORAL DAILY PRN
Status: DISCONTINUED | OUTPATIENT
Start: 2025-08-09 | End: 2025-08-14 | Stop reason: HOSPADM

## 2025-08-09 RX ORDER — ACETAMINOPHEN 325 MG/1
650 TABLET ORAL EVERY 6 HOURS PRN
Status: DISCONTINUED | OUTPATIENT
Start: 2025-08-09 | End: 2025-08-14 | Stop reason: HOSPADM

## 2025-08-09 RX ORDER — ZOLPIDEM TARTRATE 5 MG/1
5 TABLET ORAL NIGHTLY PRN
Status: DISCONTINUED | OUTPATIENT
Start: 2025-08-09 | End: 2025-08-14 | Stop reason: HOSPADM

## 2025-08-09 RX ORDER — ONDANSETRON 4 MG/1
4 TABLET, ORALLY DISINTEGRATING ORAL EVERY 8 HOURS PRN
Status: DISCONTINUED | OUTPATIENT
Start: 2025-08-09 | End: 2025-08-14 | Stop reason: HOSPADM

## 2025-08-09 RX ORDER — VALPROIC ACID 250 MG/1
250 CAPSULE ORAL EVERY MORNING
Status: DISCONTINUED | OUTPATIENT
Start: 2025-08-10 | End: 2025-08-14 | Stop reason: HOSPADM

## 2025-08-09 RX ADMIN — DEXTROSE 250 ML: 10 SOLUTION INTRAVENOUS at 20:55

## 2025-08-09 RX ADMIN — SODIUM ZIRCONIUM CYCLOSILICATE 10 G: 10 POWDER, FOR SUSPENSION ORAL at 20:49

## 2025-08-09 RX ADMIN — SODIUM CHLORIDE 1000 ML: 0.9 INJECTION, SOLUTION INTRAVENOUS at 20:47

## 2025-08-09 RX ADMIN — INSULIN HUMAN 6 UNITS: 100 INJECTION, SOLUTION PARENTERAL at 20:50

## 2025-08-09 ASSESSMENT — LIFESTYLE VARIABLES
HOW MANY STANDARD DRINKS CONTAINING ALCOHOL DO YOU HAVE ON A TYPICAL DAY: PATIENT DOES NOT DRINK
HOW OFTEN DO YOU HAVE A DRINK CONTAINING ALCOHOL: NEVER

## 2025-08-10 PROBLEM — M97.02XA PERIPROSTHETIC FRACTURE AROUND INTERNAL PROSTHETIC LEFT HIP JOINT (HCC): Status: ACTIVE | Noted: 2025-08-10

## 2025-08-10 LAB
ANION GAP SERPL CALCULATED.3IONS-SCNC: 18 MMOL/L (ref 7–16)
BASOPHILS # BLD: 0.02 K/UL (ref 0–0.2)
BASOPHILS NFR BLD: 0 % (ref 0–2)
BUN SERPL-MCNC: 85 MG/DL (ref 8–23)
CALCIUM SERPL-MCNC: 9.2 MG/DL (ref 8.8–10.2)
CHLORIDE SERPL-SCNC: 99 MMOL/L (ref 98–107)
CO2 SERPL-SCNC: 19 MMOL/L (ref 22–29)
CREAT SERPL-MCNC: 9.4 MG/DL (ref 0.5–1)
EOSINOPHIL # BLD: 0.02 K/UL (ref 0.05–0.5)
EOSINOPHILS RELATIVE PERCENT: 0 % (ref 0–6)
ERYTHROCYTE [DISTWIDTH] IN BLOOD BY AUTOMATED COUNT: 15.7 % (ref 11.5–15)
GFR, ESTIMATED: 4 ML/MIN/1.73M2
GLUCOSE BLD-MCNC: 102 MG/DL (ref 74–99)
GLUCOSE BLD-MCNC: 115 MG/DL (ref 74–99)
GLUCOSE BLD-MCNC: 96 MG/DL (ref 74–99)
GLUCOSE SERPL-MCNC: 76 MG/DL (ref 74–99)
HCT VFR BLD AUTO: 27 % (ref 34–48)
HGB BLD-MCNC: 8.7 G/DL (ref 11.5–15.5)
IMM GRANULOCYTES # BLD AUTO: 0.05 K/UL (ref 0–0.58)
IMM GRANULOCYTES NFR BLD: 1 % (ref 0–5)
LYMPHOCYTES NFR BLD: 1.48 K/UL (ref 1.5–4)
LYMPHOCYTES RELATIVE PERCENT: 17 % (ref 20–42)
MCH RBC QN AUTO: 31.4 PG (ref 26–35)
MCHC RBC AUTO-ENTMCNC: 32.2 G/DL (ref 32–34.5)
MCV RBC AUTO: 97.5 FL (ref 80–99.9)
MONOCYTES NFR BLD: 0.85 K/UL (ref 0.1–0.95)
MONOCYTES NFR BLD: 10 % (ref 2–12)
NEUTROPHILS NFR BLD: 73 % (ref 43–80)
NEUTS SEG NFR BLD: 6.4 K/UL (ref 1.8–7.3)
PLATELET # BLD AUTO: 132 K/UL (ref 130–450)
PMV BLD AUTO: 11.7 FL (ref 7–12)
POTASSIUM SERPL-SCNC: 4.3 MMOL/L (ref 3.5–5.1)
POTASSIUM SERPL-SCNC: 6.3 MMOL/L (ref 3.5–5.1)
RBC # BLD AUTO: 2.77 M/UL (ref 3.5–5.5)
SODIUM SERPL-SCNC: 136 MMOL/L (ref 136–145)
WBC OTHER # BLD: 8.8 K/UL (ref 4.5–11.5)

## 2025-08-10 PROCEDURE — 2060000000 HC ICU INTERMEDIATE R&B

## 2025-08-10 PROCEDURE — 84132 ASSAY OF SERUM POTASSIUM: CPT

## 2025-08-10 PROCEDURE — 85025 COMPLETE CBC W/AUTO DIFF WBC: CPT

## 2025-08-10 PROCEDURE — 6370000000 HC RX 637 (ALT 250 FOR IP): Performed by: INTERNAL MEDICINE

## 2025-08-10 PROCEDURE — 99232 SBSQ HOSP IP/OBS MODERATE 35: CPT | Performed by: INTERNAL MEDICINE

## 2025-08-10 PROCEDURE — 6370000000 HC RX 637 (ALT 250 FOR IP): Performed by: HOSPITALIST

## 2025-08-10 PROCEDURE — 80048 BASIC METABOLIC PNL TOTAL CA: CPT

## 2025-08-10 PROCEDURE — 2500000003 HC RX 250 WO HCPCS: Performed by: HOSPITALIST

## 2025-08-10 PROCEDURE — 82962 GLUCOSE BLOOD TEST: CPT

## 2025-08-10 PROCEDURE — 90935 HEMODIALYSIS ONE EVALUATION: CPT

## 2025-08-10 PROCEDURE — 36415 COLL VENOUS BLD VENIPUNCTURE: CPT

## 2025-08-10 RX ADMIN — ASPIRIN 81 MG: 81 TABLET, CHEWABLE ORAL at 08:58

## 2025-08-10 RX ADMIN — SEVELAMER CARBONATE 1600 MG: 800 TABLET, FILM COATED ORAL at 16:46

## 2025-08-10 RX ADMIN — Medication 10 ML: at 08:59

## 2025-08-10 RX ADMIN — SEVELAMER CARBONATE 1600 MG: 800 TABLET, FILM COATED ORAL at 08:58

## 2025-08-10 RX ADMIN — IMIPRAMINE HYDROCHLORIDE 50 MG: 25 TABLET ORAL at 20:28

## 2025-08-10 RX ADMIN — Medication 10 ML: at 20:28

## 2025-08-10 RX ADMIN — VALPROIC ACID 500 MG: 250 CAPSULE, LIQUID FILLED ORAL at 20:24

## 2025-08-10 RX ADMIN — SODIUM ZIRCONIUM CYCLOSILICATE 10 G: 10 POWDER, FOR SUSPENSION ORAL at 09:47

## 2025-08-10 RX ADMIN — SODIUM ZIRCONIUM CYCLOSILICATE 10 G: 10 POWDER, FOR SUSPENSION ORAL at 14:16

## 2025-08-10 RX ADMIN — ONDANSETRON 4 MG: 4 TABLET, ORALLY DISINTEGRATING ORAL at 09:05

## 2025-08-10 RX ADMIN — AMLODIPINE BESYLATE 2.5 MG: 2.5 TABLET ORAL at 08:58

## 2025-08-10 RX ADMIN — VALPROIC ACID 250 MG: 250 CAPSULE, LIQUID FILLED ORAL at 08:59

## 2025-08-10 RX ADMIN — ACETAMINOPHEN 650 MG: 325 TABLET ORAL at 20:25

## 2025-08-10 RX ADMIN — SODIUM ZIRCONIUM CYCLOSILICATE 10 G: 10 POWDER, FOR SUSPENSION ORAL at 20:25

## 2025-08-10 ASSESSMENT — PAIN SCALES - GENERAL
PAINLEVEL_OUTOF10: 0
PAINLEVEL_OUTOF10: 4
PAINLEVEL_OUTOF10: 3
PAINLEVEL_OUTOF10: 0
PAINLEVEL_OUTOF10: 5

## 2025-08-10 ASSESSMENT — PAIN DESCRIPTION - LOCATION
LOCATION: HAND;LEG
LOCATION: HIP

## 2025-08-10 ASSESSMENT — PAIN - FUNCTIONAL ASSESSMENT: PAIN_FUNCTIONAL_ASSESSMENT: 0-10

## 2025-08-10 ASSESSMENT — PAIN DESCRIPTION - DESCRIPTORS
DESCRIPTORS: ACHING
DESCRIPTORS: ACHING;DISCOMFORT

## 2025-08-10 ASSESSMENT — PAIN DESCRIPTION - ORIENTATION: ORIENTATION: LEFT;RIGHT

## 2025-08-11 LAB
ANION GAP SERPL CALCULATED.3IONS-SCNC: 13 MMOL/L (ref 7–16)
BASOPHILS # BLD: 0.03 K/UL (ref 0–0.2)
BASOPHILS NFR BLD: 0 % (ref 0–2)
BUN SERPL-MCNC: 34 MG/DL (ref 8–23)
CALCIUM SERPL-MCNC: 8.5 MG/DL (ref 8.8–10.2)
CHLORIDE SERPL-SCNC: 95 MMOL/L (ref 98–107)
CO2 SERPL-SCNC: 27 MMOL/L (ref 22–29)
CREAT SERPL-MCNC: 5.3 MG/DL (ref 0.5–1)
EOSINOPHIL # BLD: 0.1 K/UL (ref 0.05–0.5)
EOSINOPHILS RELATIVE PERCENT: 1 % (ref 0–6)
ERYTHROCYTE [DISTWIDTH] IN BLOOD BY AUTOMATED COUNT: 15.9 % (ref 11.5–15)
GFR, ESTIMATED: 8 ML/MIN/1.73M2
GLUCOSE SERPL-MCNC: 87 MG/DL (ref 74–99)
HCT VFR BLD AUTO: 24 % (ref 34–48)
HGB BLD-MCNC: 7.9 G/DL (ref 11.5–15.5)
IMM GRANULOCYTES # BLD AUTO: 0.03 K/UL (ref 0–0.58)
IMM GRANULOCYTES NFR BLD: 0 % (ref 0–5)
LYMPHOCYTES NFR BLD: 1.78 K/UL (ref 1.5–4)
LYMPHOCYTES RELATIVE PERCENT: 23 % (ref 20–42)
MCH RBC QN AUTO: 32 PG (ref 26–35)
MCHC RBC AUTO-ENTMCNC: 32.9 G/DL (ref 32–34.5)
MCV RBC AUTO: 97.2 FL (ref 80–99.9)
MONOCYTES NFR BLD: 0.98 K/UL (ref 0.1–0.95)
MONOCYTES NFR BLD: 12 % (ref 2–12)
NEUTROPHILS NFR BLD: 63 % (ref 43–80)
NEUTS SEG NFR BLD: 4.99 K/UL (ref 1.8–7.3)
PLATELET # BLD AUTO: 121 K/UL (ref 130–450)
PMV BLD AUTO: 11.6 FL (ref 7–12)
POTASSIUM SERPL-SCNC: 4.1 MMOL/L (ref 3.5–5.1)
RBC # BLD AUTO: 2.47 M/UL (ref 3.5–5.5)
SODIUM SERPL-SCNC: 134 MMOL/L (ref 136–145)
WBC OTHER # BLD: 7.9 K/UL (ref 4.5–11.5)

## 2025-08-11 PROCEDURE — 85025 COMPLETE CBC W/AUTO DIFF WBC: CPT

## 2025-08-11 PROCEDURE — 97535 SELF CARE MNGMENT TRAINING: CPT

## 2025-08-11 PROCEDURE — 2060000000 HC ICU INTERMEDIATE R&B

## 2025-08-11 PROCEDURE — 97161 PT EVAL LOW COMPLEX 20 MIN: CPT | Performed by: PHYSICAL THERAPIST

## 2025-08-11 PROCEDURE — 97110 THERAPEUTIC EXERCISES: CPT | Performed by: PHYSICAL THERAPIST

## 2025-08-11 PROCEDURE — 90935 HEMODIALYSIS ONE EVALUATION: CPT

## 2025-08-11 PROCEDURE — 80048 BASIC METABOLIC PNL TOTAL CA: CPT

## 2025-08-11 PROCEDURE — 97165 OT EVAL LOW COMPLEX 30 MIN: CPT

## 2025-08-11 PROCEDURE — 5A1D70Z PERFORMANCE OF URINARY FILTRATION, INTERMITTENT, LESS THAN 6 HOURS PER DAY: ICD-10-PCS | Performed by: STUDENT IN AN ORGANIZED HEALTH CARE EDUCATION/TRAINING PROGRAM

## 2025-08-11 PROCEDURE — 6370000000 HC RX 637 (ALT 250 FOR IP): Performed by: HOSPITALIST

## 2025-08-11 PROCEDURE — 36415 COLL VENOUS BLD VENIPUNCTURE: CPT

## 2025-08-11 PROCEDURE — 2500000003 HC RX 250 WO HCPCS: Performed by: HOSPITALIST

## 2025-08-11 RX ADMIN — ASPIRIN 81 MG: 81 TABLET, CHEWABLE ORAL at 08:08

## 2025-08-11 RX ADMIN — Medication 10 ML: at 20:15

## 2025-08-11 RX ADMIN — ACETAMINOPHEN 650 MG: 325 TABLET ORAL at 17:03

## 2025-08-11 RX ADMIN — VALPROIC ACID 500 MG: 250 CAPSULE, LIQUID FILLED ORAL at 20:13

## 2025-08-11 RX ADMIN — SEVELAMER CARBONATE 1600 MG: 800 TABLET, FILM COATED ORAL at 08:08

## 2025-08-11 RX ADMIN — IMIPRAMINE HYDROCHLORIDE 50 MG: 25 TABLET ORAL at 20:12

## 2025-08-11 RX ADMIN — ACETAMINOPHEN 650 MG: 325 TABLET ORAL at 03:10

## 2025-08-11 RX ADMIN — ZOLPIDEM TARTRATE 5 MG: 5 TABLET ORAL at 20:12

## 2025-08-11 RX ADMIN — Medication 10 ML: at 08:09

## 2025-08-11 RX ADMIN — AMLODIPINE BESYLATE 2.5 MG: 2.5 TABLET ORAL at 08:08

## 2025-08-11 RX ADMIN — VALPROIC ACID 250 MG: 250 CAPSULE, LIQUID FILLED ORAL at 08:08

## 2025-08-11 RX ADMIN — SEVELAMER CARBONATE 1600 MG: 800 TABLET, FILM COATED ORAL at 14:16

## 2025-08-11 ASSESSMENT — PAIN DESCRIPTION - LOCATION
LOCATION: HIP

## 2025-08-11 ASSESSMENT — PAIN - FUNCTIONAL ASSESSMENT
PAIN_FUNCTIONAL_ASSESSMENT: 0-10

## 2025-08-11 ASSESSMENT — PAIN SCALES - GENERAL
PAINLEVEL_OUTOF10: 2
PAINLEVEL_OUTOF10: 5
PAINLEVEL_OUTOF10: 0
PAINLEVEL_OUTOF10: 3
PAINLEVEL_OUTOF10: 4
PAINLEVEL_OUTOF10: 3

## 2025-08-11 ASSESSMENT — PAIN DESCRIPTION - DESCRIPTORS
DESCRIPTORS: ACHING
DESCRIPTORS: ACHING
DESCRIPTORS: ACHING;DISCOMFORT
DESCRIPTORS: ACHING;SORE

## 2025-08-11 ASSESSMENT — PAIN DESCRIPTION - ORIENTATION: ORIENTATION: LEFT

## 2025-08-12 LAB
ANION GAP SERPL CALCULATED.3IONS-SCNC: 10 MMOL/L (ref 7–16)
BASOPHILS # BLD: 0.03 K/UL (ref 0–0.2)
BASOPHILS NFR BLD: 0 % (ref 0–2)
BUN SERPL-MCNC: 19 MG/DL (ref 8–23)
CALCIUM SERPL-MCNC: 8.6 MG/DL (ref 8.8–10.2)
CHLORIDE SERPL-SCNC: 100 MMOL/L (ref 98–107)
CO2 SERPL-SCNC: 27 MMOL/L (ref 22–29)
CREAT SERPL-MCNC: 4 MG/DL (ref 0.5–1)
EOSINOPHIL # BLD: 0.06 K/UL (ref 0.05–0.5)
EOSINOPHILS RELATIVE PERCENT: 1 % (ref 0–6)
ERYTHROCYTE [DISTWIDTH] IN BLOOD BY AUTOMATED COUNT: 16.2 % (ref 11.5–15)
GFR, ESTIMATED: 11 ML/MIN/1.73M2
GLUCOSE SERPL-MCNC: 111 MG/DL (ref 74–99)
HCT VFR BLD AUTO: 23.5 % (ref 34–48)
HGB BLD-MCNC: 7.7 G/DL (ref 11.5–15.5)
IMM GRANULOCYTES # BLD AUTO: 0.03 K/UL (ref 0–0.58)
IMM GRANULOCYTES NFR BLD: 0 % (ref 0–5)
LYMPHOCYTES NFR BLD: 1.79 K/UL (ref 1.5–4)
LYMPHOCYTES RELATIVE PERCENT: 20 % (ref 20–42)
MCH RBC QN AUTO: 32.6 PG (ref 26–35)
MCHC RBC AUTO-ENTMCNC: 32.8 G/DL (ref 32–34.5)
MCV RBC AUTO: 99.6 FL (ref 80–99.9)
MONOCYTES NFR BLD: 1.1 K/UL (ref 0.1–0.95)
MONOCYTES NFR BLD: 12 % (ref 2–12)
NEUTROPHILS NFR BLD: 67 % (ref 43–80)
NEUTS SEG NFR BLD: 6.15 K/UL (ref 1.8–7.3)
PHOSPHATE SERPL-MCNC: 3.1 MG/DL (ref 2.5–4.5)
PLATELET # BLD AUTO: 119 K/UL (ref 130–450)
PMV BLD AUTO: 11.6 FL (ref 7–12)
POTASSIUM SERPL-SCNC: 4.1 MMOL/L (ref 3.5–5.1)
RBC # BLD AUTO: 2.36 M/UL (ref 3.5–5.5)
SODIUM SERPL-SCNC: 137 MMOL/L (ref 136–145)
WBC OTHER # BLD: 9.2 K/UL (ref 4.5–11.5)

## 2025-08-12 PROCEDURE — 2500000003 HC RX 250 WO HCPCS: Performed by: HOSPITALIST

## 2025-08-12 PROCEDURE — 80048 BASIC METABOLIC PNL TOTAL CA: CPT

## 2025-08-12 PROCEDURE — 6370000000 HC RX 637 (ALT 250 FOR IP): Performed by: HOSPITALIST

## 2025-08-12 PROCEDURE — 97530 THERAPEUTIC ACTIVITIES: CPT | Performed by: PHYSICAL THERAPIST

## 2025-08-12 PROCEDURE — 97110 THERAPEUTIC EXERCISES: CPT | Performed by: PHYSICAL THERAPIST

## 2025-08-12 PROCEDURE — 36415 COLL VENOUS BLD VENIPUNCTURE: CPT

## 2025-08-12 PROCEDURE — 84100 ASSAY OF PHOSPHORUS: CPT

## 2025-08-12 PROCEDURE — 6360000002 HC RX W HCPCS: Performed by: INTERNAL MEDICINE

## 2025-08-12 PROCEDURE — 2060000000 HC ICU INTERMEDIATE R&B

## 2025-08-12 PROCEDURE — 85025 COMPLETE CBC W/AUTO DIFF WBC: CPT

## 2025-08-12 RX ORDER — HEPARIN SODIUM 5000 [USP'U]/ML
5000 INJECTION, SOLUTION INTRAVENOUS; SUBCUTANEOUS 2 TIMES DAILY
Status: DISCONTINUED | OUTPATIENT
Start: 2025-08-12 | End: 2025-08-14 | Stop reason: HOSPADM

## 2025-08-12 RX ADMIN — HEPARIN SODIUM 5000 UNITS: 5000 INJECTION, SOLUTION INTRAVENOUS; SUBCUTANEOUS at 12:00

## 2025-08-12 RX ADMIN — ZOLPIDEM TARTRATE 5 MG: 5 TABLET ORAL at 20:14

## 2025-08-12 RX ADMIN — ACETAMINOPHEN 650 MG: 325 TABLET ORAL at 08:03

## 2025-08-12 RX ADMIN — SEVELAMER CARBONATE 1600 MG: 800 TABLET, FILM COATED ORAL at 12:00

## 2025-08-12 RX ADMIN — SEVELAMER CARBONATE 1600 MG: 800 TABLET, FILM COATED ORAL at 16:47

## 2025-08-12 RX ADMIN — VALPROIC ACID 250 MG: 250 CAPSULE, LIQUID FILLED ORAL at 08:06

## 2025-08-12 RX ADMIN — ASPIRIN 81 MG: 81 TABLET, CHEWABLE ORAL at 08:03

## 2025-08-12 RX ADMIN — AMLODIPINE BESYLATE 2.5 MG: 2.5 TABLET ORAL at 08:03

## 2025-08-12 RX ADMIN — SEVELAMER CARBONATE 1600 MG: 800 TABLET, FILM COATED ORAL at 08:04

## 2025-08-12 RX ADMIN — Medication 10 ML: at 20:17

## 2025-08-12 RX ADMIN — HEPARIN SODIUM 5000 UNITS: 5000 INJECTION, SOLUTION INTRAVENOUS; SUBCUTANEOUS at 20:15

## 2025-08-12 RX ADMIN — IMIPRAMINE HYDROCHLORIDE 50 MG: 25 TABLET ORAL at 20:14

## 2025-08-12 RX ADMIN — ACETAMINOPHEN 650 MG: 325 TABLET ORAL at 19:23

## 2025-08-12 RX ADMIN — POLYETHYLENE GLYCOL 3350 17 G: 17 POWDER, FOR SOLUTION ORAL at 20:15

## 2025-08-12 RX ADMIN — ACETAMINOPHEN 650 MG: 325 TABLET ORAL at 02:27

## 2025-08-12 RX ADMIN — VALPROIC ACID 500 MG: 250 CAPSULE, LIQUID FILLED ORAL at 20:14

## 2025-08-12 RX ADMIN — Medication 10 ML: at 08:05

## 2025-08-12 ASSESSMENT — PAIN DESCRIPTION - ORIENTATION
ORIENTATION: LEFT
ORIENTATION: LEFT

## 2025-08-12 ASSESSMENT — PAIN - FUNCTIONAL ASSESSMENT
PAIN_FUNCTIONAL_ASSESSMENT: 0-10
PAIN_FUNCTIONAL_ASSESSMENT: PREVENTS OR INTERFERES SOME ACTIVE ACTIVITIES AND ADLS
PAIN_FUNCTIONAL_ASSESSMENT: 0-10
PAIN_FUNCTIONAL_ASSESSMENT: 0-10
PAIN_FUNCTIONAL_ASSESSMENT: PREVENTS OR INTERFERES SOME ACTIVE ACTIVITIES AND ADLS

## 2025-08-12 ASSESSMENT — PAIN DESCRIPTION - DESCRIPTORS
DESCRIPTORS: ACHING
DESCRIPTORS: CRAMPING;DISCOMFORT
DESCRIPTORS: DISCOMFORT

## 2025-08-12 ASSESSMENT — PAIN SCALES - GENERAL
PAINLEVEL_OUTOF10: 6
PAINLEVEL_OUTOF10: 0
PAINLEVEL_OUTOF10: 3
PAINLEVEL_OUTOF10: 0
PAINLEVEL_OUTOF10: 8

## 2025-08-12 ASSESSMENT — PAIN DESCRIPTION - LOCATION
LOCATION: HIP
LOCATION: LEG;HIP
LOCATION: HIP

## 2025-08-13 ENCOUNTER — APPOINTMENT (OUTPATIENT)
Dept: CT IMAGING | Age: 85
DRG: 535 | End: 2025-08-13
Payer: MEDICARE

## 2025-08-13 LAB
ANION GAP SERPL CALCULATED.3IONS-SCNC: 14 MMOL/L (ref 7–16)
BUN SERPL-MCNC: 33 MG/DL (ref 8–23)
CALCIUM SERPL-MCNC: 8.9 MG/DL (ref 8.8–10.2)
CHLORIDE SERPL-SCNC: 97 MMOL/L (ref 98–107)
CO2 SERPL-SCNC: 23 MMOL/L (ref 22–29)
CREAT SERPL-MCNC: 5.7 MG/DL (ref 0.5–1)
EKG ATRIAL RATE: 65 BPM
EKG P AXIS: 30 DEGREES
EKG P-R INTERVAL: 164 MS
EKG Q-T INTERVAL: 416 MS
EKG QRS DURATION: 74 MS
EKG QTC CALCULATION (BAZETT): 432 MS
EKG R AXIS: 55 DEGREES
EKG T AXIS: 88 DEGREES
EKG VENTRICULAR RATE: 65 BPM
ERYTHROCYTE [DISTWIDTH] IN BLOOD BY AUTOMATED COUNT: 15.9 % (ref 11.5–15)
GFR, ESTIMATED: 7 ML/MIN/1.73M2
GLUCOSE SERPL-MCNC: 89 MG/DL (ref 74–99)
HCT VFR BLD AUTO: 19 % (ref 34–48)
HCT VFR BLD AUTO: 21.6 % (ref 34–48)
HGB BLD-MCNC: 6.3 G/DL (ref 11.5–15.5)
HGB BLD-MCNC: 7 G/DL (ref 11.5–15.5)
MCH RBC QN AUTO: 32.7 PG (ref 26–35)
MCHC RBC AUTO-ENTMCNC: 32.4 G/DL (ref 32–34.5)
MCV RBC AUTO: 100.9 FL (ref 80–99.9)
PLATELET # BLD AUTO: 132 K/UL (ref 130–450)
PMV BLD AUTO: 11.7 FL (ref 7–12)
POTASSIUM SERPL-SCNC: 4.8 MMOL/L (ref 3.5–5.1)
RBC # BLD AUTO: 2.14 M/UL (ref 3.5–5.5)
SODIUM SERPL-SCNC: 134 MMOL/L (ref 136–145)
WBC OTHER # BLD: 12.3 K/UL (ref 4.5–11.5)

## 2025-08-13 PROCEDURE — 90935 HEMODIALYSIS ONE EVALUATION: CPT

## 2025-08-13 PROCEDURE — 36415 COLL VENOUS BLD VENIPUNCTURE: CPT

## 2025-08-13 PROCEDURE — 97110 THERAPEUTIC EXERCISES: CPT | Performed by: PHYSICAL THERAPIST

## 2025-08-13 PROCEDURE — 86900 BLOOD TYPING SEROLOGIC ABO: CPT

## 2025-08-13 PROCEDURE — P9016 RBC LEUKOCYTES REDUCED: HCPCS

## 2025-08-13 PROCEDURE — 36430 TRANSFUSION BLD/BLD COMPNT: CPT

## 2025-08-13 PROCEDURE — 85018 HEMOGLOBIN: CPT

## 2025-08-13 PROCEDURE — 86850 RBC ANTIBODY SCREEN: CPT

## 2025-08-13 PROCEDURE — 97530 THERAPEUTIC ACTIVITIES: CPT | Performed by: PHYSICAL THERAPIST

## 2025-08-13 PROCEDURE — 6360000002 HC RX W HCPCS: Performed by: STUDENT IN AN ORGANIZED HEALTH CARE EDUCATION/TRAINING PROGRAM

## 2025-08-13 PROCEDURE — 2060000000 HC ICU INTERMEDIATE R&B

## 2025-08-13 PROCEDURE — 73700 CT LOWER EXTREMITY W/O DYE: CPT

## 2025-08-13 PROCEDURE — 80048 BASIC METABOLIC PNL TOTAL CA: CPT

## 2025-08-13 PROCEDURE — 86923 COMPATIBILITY TEST ELECTRIC: CPT

## 2025-08-13 PROCEDURE — 30233N1 TRANSFUSION OF NONAUTOLOGOUS RED BLOOD CELLS INTO PERIPHERAL VEIN, PERCUTANEOUS APPROACH: ICD-10-PCS | Performed by: INTERNAL MEDICINE

## 2025-08-13 PROCEDURE — 85014 HEMATOCRIT: CPT

## 2025-08-13 PROCEDURE — 86901 BLOOD TYPING SEROLOGIC RH(D): CPT

## 2025-08-13 PROCEDURE — 6370000000 HC RX 637 (ALT 250 FOR IP): Performed by: HOSPITALIST

## 2025-08-13 PROCEDURE — 2500000003 HC RX 250 WO HCPCS: Performed by: HOSPITALIST

## 2025-08-13 PROCEDURE — 85027 COMPLETE CBC AUTOMATED: CPT

## 2025-08-13 RX ORDER — SODIUM CHLORIDE 9 MG/ML
INJECTION, SOLUTION INTRAVENOUS PRN
Status: DISCONTINUED | OUTPATIENT
Start: 2025-08-13 | End: 2025-08-14 | Stop reason: HOSPADM

## 2025-08-13 RX ADMIN — VALPROIC ACID 500 MG: 250 CAPSULE, LIQUID FILLED ORAL at 19:55

## 2025-08-13 RX ADMIN — Medication 10 ML: at 09:00

## 2025-08-13 RX ADMIN — VALPROIC ACID 250 MG: 250 CAPSULE, LIQUID FILLED ORAL at 13:03

## 2025-08-13 RX ADMIN — ACETAMINOPHEN 650 MG: 325 TABLET ORAL at 08:17

## 2025-08-13 RX ADMIN — IMIPRAMINE HYDROCHLORIDE 50 MG: 25 TABLET ORAL at 19:55

## 2025-08-13 RX ADMIN — SEVELAMER CARBONATE 1600 MG: 800 TABLET, FILM COATED ORAL at 18:36

## 2025-08-13 RX ADMIN — Medication 10 ML: at 19:56

## 2025-08-13 RX ADMIN — AMLODIPINE BESYLATE 2.5 MG: 2.5 TABLET ORAL at 13:03

## 2025-08-13 RX ADMIN — ASPIRIN 81 MG: 81 TABLET, CHEWABLE ORAL at 13:11

## 2025-08-13 RX ADMIN — SEVELAMER CARBONATE 1600 MG: 800 TABLET, FILM COATED ORAL at 13:03

## 2025-08-13 RX ADMIN — EPOETIN ALFA-EPBX 5000 UNITS: 3000 INJECTION, SOLUTION INTRAVENOUS; SUBCUTANEOUS at 09:00

## 2025-08-13 RX ADMIN — ACETAMINOPHEN 650 MG: 325 TABLET ORAL at 18:40

## 2025-08-13 ASSESSMENT — PAIN - FUNCTIONAL ASSESSMENT
PAIN_FUNCTIONAL_ASSESSMENT: PREVENTS OR INTERFERES WITH MANY ACTIVE NOT PASSIVE ACTIVITIES
PAIN_FUNCTIONAL_ASSESSMENT: 0-10
PAIN_FUNCTIONAL_ASSESSMENT: PREVENTS OR INTERFERES WITH ALL ACTIVE AND SOME PASSIVE ACTIVITIES
PAIN_FUNCTIONAL_ASSESSMENT: 0-10

## 2025-08-13 ASSESSMENT — PAIN DESCRIPTION - DESCRIPTORS
DESCRIPTORS: ACHING
DESCRIPTORS: ACHING

## 2025-08-13 ASSESSMENT — PAIN DESCRIPTION - LOCATION: LOCATION: HIP;LEG;WRIST

## 2025-08-13 ASSESSMENT — PAIN SCALES - GENERAL
PAINLEVEL_OUTOF10: 0
PAINLEVEL_OUTOF10: 6
PAINLEVEL_OUTOF10: 5
PAINLEVEL_OUTOF10: 0

## 2025-08-13 ASSESSMENT — PAIN DESCRIPTION - ORIENTATION
ORIENTATION: LEFT
ORIENTATION: LEFT

## 2025-08-14 VITALS
OXYGEN SATURATION: 97 % | RESPIRATION RATE: 18 BRPM | TEMPERATURE: 98.8 F | SYSTOLIC BLOOD PRESSURE: 134 MMHG | DIASTOLIC BLOOD PRESSURE: 47 MMHG | HEIGHT: 63 IN | WEIGHT: 116.7 LBS | HEART RATE: 72 BPM | BODY MASS INDEX: 20.68 KG/M2

## 2025-08-14 LAB
ABO/RH: NORMAL
ANION GAP SERPL CALCULATED.3IONS-SCNC: 12 MMOL/L (ref 7–16)
ANTIBODY SCREEN: NEGATIVE
ARM BAND NUMBER: NORMAL
BLOOD BANK BLOOD PRODUCT EXPIRATION DATE: NORMAL
BLOOD BANK DISPENSE STATUS: NORMAL
BLOOD BANK ISBT PRODUCT BLOOD TYPE: 9500
BLOOD BANK PRODUCT CODE: NORMAL
BLOOD BANK SAMPLE EXPIRATION: NORMAL
BLOOD BANK UNIT TYPE AND RH: NORMAL
BPU ID: NORMAL
BUN SERPL-MCNC: 22 MG/DL (ref 8–23)
CALCIUM SERPL-MCNC: 8.6 MG/DL (ref 8.8–10.2)
CHLORIDE SERPL-SCNC: 98 MMOL/L (ref 98–107)
CO2 SERPL-SCNC: 27 MMOL/L (ref 22–29)
COMPONENT: NORMAL
CREAT SERPL-MCNC: 3.6 MG/DL (ref 0.5–1)
CROSSMATCH RESULT: NORMAL
ERYTHROCYTE [DISTWIDTH] IN BLOOD BY AUTOMATED COUNT: 17.4 % (ref 11.5–15)
GFR, ESTIMATED: 12 ML/MIN/1.73M2
GLUCOSE SERPL-MCNC: 88 MG/DL (ref 74–99)
HCT VFR BLD AUTO: 26.4 % (ref 34–48)
HGB BLD-MCNC: 8.4 G/DL (ref 11.5–15.5)
MCH RBC QN AUTO: 31 PG (ref 26–35)
MCHC RBC AUTO-ENTMCNC: 31.8 G/DL (ref 32–34.5)
MCV RBC AUTO: 97.4 FL (ref 80–99.9)
PLATELET # BLD AUTO: 142 K/UL (ref 130–450)
PMV BLD AUTO: 11.2 FL (ref 7–12)
POTASSIUM SERPL-SCNC: 4.4 MMOL/L (ref 3.5–5.1)
RBC # BLD AUTO: 2.71 M/UL (ref 3.5–5.5)
SODIUM SERPL-SCNC: 136 MMOL/L (ref 136–145)
TRANSFUSION STATUS: NORMAL
UNIT DIVISION: 0
UNIT ISSUE DATE/TIME: NORMAL
WBC OTHER # BLD: 9.2 K/UL (ref 4.5–11.5)

## 2025-08-14 PROCEDURE — 80048 BASIC METABOLIC PNL TOTAL CA: CPT

## 2025-08-14 PROCEDURE — 85027 COMPLETE CBC AUTOMATED: CPT

## 2025-08-14 PROCEDURE — 6370000000 HC RX 637 (ALT 250 FOR IP): Performed by: HOSPITALIST

## 2025-08-14 PROCEDURE — 36415 COLL VENOUS BLD VENIPUNCTURE: CPT

## 2025-08-14 RX ADMIN — ACETAMINOPHEN 650 MG: 325 TABLET ORAL at 10:10

## 2025-08-14 RX ADMIN — AMLODIPINE BESYLATE 2.5 MG: 2.5 TABLET ORAL at 10:11

## 2025-08-14 RX ADMIN — SEVELAMER CARBONATE 1600 MG: 800 TABLET, FILM COATED ORAL at 12:59

## 2025-08-14 RX ADMIN — SEVELAMER CARBONATE 1600 MG: 800 TABLET, FILM COATED ORAL at 10:11

## 2025-08-14 RX ADMIN — VALPROIC ACID 250 MG: 250 CAPSULE, LIQUID FILLED ORAL at 10:10

## 2025-08-14 ASSESSMENT — PAIN SCALES - GENERAL: PAINLEVEL_OUTOF10: 6

## 2025-08-14 ASSESSMENT — PAIN - FUNCTIONAL ASSESSMENT
PAIN_FUNCTIONAL_ASSESSMENT: PREVENTS OR INTERFERES WITH ALL ACTIVE AND SOME PASSIVE ACTIVITIES
PAIN_FUNCTIONAL_ASSESSMENT: 0-10

## 2025-08-14 ASSESSMENT — PAIN DESCRIPTION - DESCRIPTORS: DESCRIPTORS: ACHING

## 2025-08-14 ASSESSMENT — PAIN DESCRIPTION - ORIENTATION: ORIENTATION: LEFT

## 2025-08-14 ASSESSMENT — PAIN DESCRIPTION - LOCATION: LOCATION: HIP

## 2025-08-18 ENCOUNTER — TELEPHONE (OUTPATIENT)
Dept: CARDIOLOGY CLINIC | Age: 85
End: 2025-08-18

## 2025-09-05 ENCOUNTER — TELEPHONE (OUTPATIENT)
Dept: CARDIOLOGY CLINIC | Age: 85
End: 2025-09-05